# Patient Record
Sex: FEMALE | Race: WHITE | NOT HISPANIC OR LATINO | Employment: FULL TIME | URBAN - METROPOLITAN AREA
[De-identification: names, ages, dates, MRNs, and addresses within clinical notes are randomized per-mention and may not be internally consistent; named-entity substitution may affect disease eponyms.]

---

## 2020-03-31 ENCOUNTER — TELEPHONE (OUTPATIENT)
Dept: OBSTETRICS AND GYNECOLOGY | Facility: CLINIC | Age: 30
End: 2020-03-31

## 2020-03-31 NOTE — TELEPHONE ENCOUNTER
Called patient and changed appointment to a Video visit and patient stated that works perfect for them and appointment made and noted. Patient stated wanted to get a IUD ordered.

## 2020-04-01 ENCOUNTER — TELEPHONE (OUTPATIENT)
Dept: OBSTETRICS AND GYNECOLOGY | Facility: CLINIC | Age: 30
End: 2020-04-01

## 2020-04-01 ENCOUNTER — OFFICE VISIT (OUTPATIENT)
Dept: OBSTETRICS AND GYNECOLOGY | Facility: CLINIC | Age: 30
End: 2020-04-01
Payer: COMMERCIAL

## 2020-04-01 DIAGNOSIS — Z30.09 BIRTH CONTROL COUNSELING: Primary | ICD-10-CM

## 2020-04-01 DIAGNOSIS — Z30.9 ENCOUNTER FOR CONTRACEPTIVE MANAGEMENT, UNSPECIFIED TYPE: ICD-10-CM

## 2020-04-01 PROCEDURE — 99202 PR OFFICE/OUTPT VISIT, NEW, LEVL II, 15-29 MIN: ICD-10-PCS | Mod: 95,,, | Performed by: OBSTETRICS & GYNECOLOGY

## 2020-04-01 PROCEDURE — 99202 OFFICE O/P NEW SF 15 MIN: CPT | Mod: 95,,, | Performed by: OBSTETRICS & GYNECOLOGY

## 2020-04-01 NOTE — TELEPHONE ENCOUNTER
Called patient and change appointment time MD request and patient stated that works for them. Thanked patient for changing time.

## 2020-04-01 NOTE — PROGRESS NOTES
No chief complaint on file.      History of Present Illness   29 y.o.  female patient presents today for IUD consultation.  x 1.   Counseled on Risks, Benefits and Alternatives to progesterone IUD, discused with patient in detail, all questions answered and patient agreed to proceed. Recommended Chiquita.     The patient location is: home  The chief complaint leading to consultation is: IUD counseling  Visit type: Virtual visit with synchronous audio and video  Total time spent with patient: 15 myocardial infarction   Each patient to whom he or she provides medical services by telemedicine is:  (1) informed of the relationship between the physician and patient and the respective role of any other health care provider with respect to management of the patient; and (2) notified that he or she may decline to receive medical services by telemedicine and may withdraw from such care at any time.      Past medical and surgical history reviewed.   I have reviewed the patient's medical history in detail and updated the computerized patient record.    Review of patient's allergies indicates:   Allergen Reactions    Eggs [egg derived] Nausea Only    Ondansetron Dermatitis    Sulfa (sulfonamide antibiotics) Nausea Only         Review of Systems - Negative except HPI  GEN ROS: negative for - chills or fever  Breast ROS: negative for breast lumps  Genito-Urinary ROS: no dysuria, trouble voiding, or hematuria      Physical Examination:  Deferred, video visit      Assessment:  Request IUD for Birth control   Order chiquita IUD- good IUD condidate    Plan:  Follow up with next cycle for IUD placement  Patient informed will be contacted with results within 2 weeks. Encouraged to please call back or email if she has not heard from us by then.

## 2020-04-09 ENCOUNTER — TELEPHONE (OUTPATIENT)
Dept: OBSTETRICS AND GYNECOLOGY | Facility: CLINIC | Age: 30
End: 2020-04-09

## 2020-04-09 NOTE — TELEPHONE ENCOUNTER
Patient called and notified IUD was approved and appointment was made and noted. Patient stated thanks for the call.

## 2020-04-21 DIAGNOSIS — Z01.84 ANTIBODY RESPONSE EXAMINATION: ICD-10-CM

## 2020-04-23 ENCOUNTER — LAB VISIT (OUTPATIENT)
Dept: LAB | Facility: HOSPITAL | Age: 30
End: 2020-04-23
Attending: INTERNAL MEDICINE
Payer: COMMERCIAL

## 2020-04-23 DIAGNOSIS — Z01.84 ANTIBODY RESPONSE EXAMINATION: ICD-10-CM

## 2020-04-23 LAB — SARS-COV-2 IGG SERPL QL IA: NEGATIVE

## 2020-04-23 PROCEDURE — 36415 COLL VENOUS BLD VENIPUNCTURE: CPT

## 2020-04-23 PROCEDURE — 86769 SARS-COV-2 COVID-19 ANTIBODY: CPT

## 2020-05-01 RX ORDER — CIPROFLOXACIN 500 MG/1
500 TABLET ORAL EVERY 12 HOURS
Qty: 14 TABLET | Refills: 0 | Status: SHIPPED | OUTPATIENT
Start: 2020-05-01 | End: 2020-05-08

## 2020-05-06 ENCOUNTER — TELEPHONE (OUTPATIENT)
Dept: OBSTETRICS AND GYNECOLOGY | Facility: CLINIC | Age: 30
End: 2020-05-06

## 2020-05-06 NOTE — TELEPHONE ENCOUNTER
Called patient and notified them they need to refrain from sexual intercourse two weeks prior to having IUD placement and they stated they understood and stated thanks for the notifying them.

## 2020-05-18 ENCOUNTER — PATIENT MESSAGE (OUTPATIENT)
Dept: OBSTETRICS AND GYNECOLOGY | Facility: CLINIC | Age: 30
End: 2020-05-18

## 2020-06-09 ENCOUNTER — OFFICE VISIT (OUTPATIENT)
Dept: OBSTETRICS AND GYNECOLOGY | Facility: CLINIC | Age: 30
End: 2020-06-09
Payer: COMMERCIAL

## 2020-06-09 VITALS
BODY MASS INDEX: 29.04 KG/M2 | DIASTOLIC BLOOD PRESSURE: 60 MMHG | SYSTOLIC BLOOD PRESSURE: 104 MMHG | WEIGHT: 179.88 LBS

## 2020-06-09 DIAGNOSIS — Z30.430 ENCOUNTER FOR IUD INSERTION: ICD-10-CM

## 2020-06-09 LAB
B-HCG UR QL: NEGATIVE
CTP QC/QA: YES

## 2020-06-09 PROCEDURE — 99999 PR PBB SHADOW E&M-EST. PATIENT-LVL III: CPT | Mod: PBBFAC,,, | Performed by: OBSTETRICS & GYNECOLOGY

## 2020-06-09 PROCEDURE — 58300 INSERT INTRAUTERINE DEVICE: CPT | Mod: S$GLB,,, | Performed by: OBSTETRICS & GYNECOLOGY

## 2020-06-09 PROCEDURE — 81025 POCT URINE PREGNANCY: ICD-10-PCS | Mod: S$GLB,,, | Performed by: OBSTETRICS & GYNECOLOGY

## 2020-06-09 PROCEDURE — 99999 PR PBB SHADOW E&M-EST. PATIENT-LVL III: ICD-10-PCS | Mod: PBBFAC,,, | Performed by: OBSTETRICS & GYNECOLOGY

## 2020-06-09 PROCEDURE — 81025 URINE PREGNANCY TEST: CPT | Mod: S$GLB,,, | Performed by: OBSTETRICS & GYNECOLOGY

## 2020-06-09 PROCEDURE — 58300 PR INSERT INTRAUTERINE DEVICE: ICD-10-PCS | Mod: S$GLB,,, | Performed by: OBSTETRICS & GYNECOLOGY

## 2020-06-09 PROCEDURE — 99499 UNLISTED E&M SERVICE: CPT | Mod: S$GLB,,, | Performed by: OBSTETRICS & GYNECOLOGY

## 2020-06-09 PROCEDURE — 99499 NO LOS: ICD-10-PCS | Mod: S$GLB,,, | Performed by: OBSTETRICS & GYNECOLOGY

## 2020-06-09 RX ORDER — SERTRALINE HYDROCHLORIDE 100 MG/1
100 TABLET, FILM COATED ORAL DAILY
COMMUNITY
End: 2020-08-11 | Stop reason: SDUPTHER

## 2020-06-09 NOTE — PROGRESS NOTES
Intrauterine device Placement:  6/9/2020      PRE-IUD PLACEMENT COUNSELING:  All contraceptive options were reviewed and the patient chooses an IUD.  The patient's history was reviewed and there are no contraindications to an IUD. The procedure and minimal risks of pain, bleeding, perforation and infection at the insertion and spontaneous expulsion within the first two weeks was discussed. The benefits of amenorrhea and no systemic side effects were explained. All questions were answered and the patient agrees to proceed. Consent was signed (scanned into computer).    EXAM:  Uterine Position: mid    PROCEDURE:  TIME OUT PERFORMED.  The cervix visualized with a speculum.  A single tooth tenaculum was not placed on the anterior lip.  The uterus sounded to 8cm using sterile technique.  A Liletta IUD (lot#[id number])  was loaded and placed high in uterine fundus without difficulty using sterile technique.  The string was cut to 2cm length from exo cervix.   All instruments were removed from the cervix and vagina and the procedure was tolerated well.     ASSESSMENT:  1. Contraception management / IUD insertion.V25.0.    POST IUD PLACEMENT COUNSELING:  Manage post IUD placement pain with NSAIDs, Tylenol or Rx per MedCard.  IUD danger signs and how to check the strings.  Removal in 5 years for Mirena IUD and in 10 years for Copper IUD.    Counseling lasted approximately 15 minutes and all her questions were answered.    FOLLOW-UP: With me in four weeks.

## 2020-07-07 ENCOUNTER — OFFICE VISIT (OUTPATIENT)
Dept: OBSTETRICS AND GYNECOLOGY | Facility: CLINIC | Age: 30
End: 2020-07-07
Payer: COMMERCIAL

## 2020-07-07 VITALS
SYSTOLIC BLOOD PRESSURE: 120 MMHG | WEIGHT: 176.81 LBS | DIASTOLIC BLOOD PRESSURE: 64 MMHG | HEIGHT: 66 IN | BODY MASS INDEX: 28.42 KG/M2

## 2020-07-07 DIAGNOSIS — Z12.4 PAP SMEAR FOR CERVICAL CANCER SCREENING: Primary | ICD-10-CM

## 2020-07-07 PROCEDURE — 87624 HPV HI-RISK TYP POOLED RSLT: CPT

## 2020-07-07 PROCEDURE — 99395 PREV VISIT EST AGE 18-39: CPT | Mod: S$GLB,,, | Performed by: OBSTETRICS & GYNECOLOGY

## 2020-07-07 PROCEDURE — 88175 CYTOPATH C/V AUTO FLUID REDO: CPT | Performed by: PATHOLOGY

## 2020-07-07 PROCEDURE — 99395 PR PREVENTIVE VISIT,EST,18-39: ICD-10-PCS | Mod: S$GLB,,, | Performed by: OBSTETRICS & GYNECOLOGY

## 2020-07-07 PROCEDURE — 88141 CYTOPATH C/V INTERPRET: CPT | Mod: ,,, | Performed by: PATHOLOGY

## 2020-07-07 PROCEDURE — 88141 PR  CYTOPATH CERV/VAG INTERPRET: ICD-10-PCS | Mod: ,,, | Performed by: PATHOLOGY

## 2020-07-07 PROCEDURE — 99999 PR PBB SHADOW E&M-EST. PATIENT-LVL III: ICD-10-PCS | Mod: PBBFAC,,, | Performed by: OBSTETRICS & GYNECOLOGY

## 2020-07-07 PROCEDURE — 99999 PR PBB SHADOW E&M-EST. PATIENT-LVL III: CPT | Mod: PBBFAC,,, | Performed by: OBSTETRICS & GYNECOLOGY

## 2020-07-07 NOTE — PROGRESS NOTES
Chief Complaint   Patient presents with    pap/iud check       History and Physical:  No LMP recorded.       Veronique Vick is a 29 y.o.  female who presents today for her routine annual GYN exam. The patient has no Gynecology complaints today. iud check      Allergies:   Review of patient's allergies indicates:   Allergen Reactions    Eggs [egg derived] Nausea Only    Ondansetron Dermatitis    Sulfa (sulfonamide antibiotics) Nausea Only    Sulfamethoxazole-trimethoprim Diarrhea       Past Medical History:   Diagnosis Date    Wrist fracture     left       Past Surgical History:   Procedure Laterality Date    WISDOM TOOTH EXTRACTION         MEDS:   Current Outpatient Medications on File Prior to Visit   Medication Sig Dispense Refill    sertraline (ZOLOFT) 100 MG tablet Take 100 mg by mouth once daily.       No current facility-administered medications on file prior to visit.        OB History        1    Para   1    Term                AB        Living   1       SAB        TAB        Ectopic        Multiple        Live Births                     Social History     Socioeconomic History    Marital status: Single     Spouse name: Not on file    Number of children: Not on file    Years of education: Not on file    Highest education level: Not on file   Occupational History    Not on file   Social Needs    Financial resource strain: Not on file    Food insecurity     Worry: Not on file     Inability: Not on file    Transportation needs     Medical: Not on file     Non-medical: Not on file   Tobacco Use    Smoking status: Never Smoker    Smokeless tobacco: Never Used   Substance and Sexual Activity    Alcohol use: Yes     Alcohol/week: 0.0 standard drinks     Comment: social    Drug use: No    Sexual activity: Yes     Partners: Male     Birth control/protection: I.U.D.   Lifestyle    Physical activity     Days per week: Not on file     Minutes per session: Not on file     "Stress: Not on file   Relationships    Social connections     Talks on phone: Not on file     Gets together: Not on file     Attends Sikhism service: Not on file     Active member of club or organization: Not on file     Attends meetings of clubs or organizations: Not on file     Relationship status: Not on file   Other Topics Concern    Not on file   Social History Narrative    Not on file       Family History   Problem Relation Age of Onset    Hyperlipidemia Father     Hypertension Father          Past medical and surgical history reviewed.   I have reviewed the patient's medical history in detail and updated the computerized patient record.        Review of System:   General: no chills, fever, night sweats, weight gain or weight loss  Psychological: no depression or suicidal ideation  Breasts: no new or changing breast lumps, nipple discharge or masses.  Respiratory: no cough, shortness of breath, or wheezing  Cardiovascular: no chest pain or dyspnea on exertion  Gastrointestinal: no abdominal pain, change in bowel habits, or black or bloody stools  Genito-Urinary: no incontinence, urinary frequency/urgency or vulvar/vaginal symptoms, pelvic pain or abnormal vaginal bleeding.  Musculoskeletal: no gait disturbance or muscular weakness      Physical Exam:   /64   Ht 5' 6" (1.676 m)   Wt 80.2 kg (176 lb 12.9 oz)   BMI 28.54 kg/m²   Constitutional: She appears alert and responsive. She appears well-developed, well-groomed, and well-nourished. No distress. OverWeight   HENT:   Head: Normocephalic and atraumatic.   Eyes: Conjunctivae and EOM are normal. No scleral icterus.   Neck: Symmetrical. Normal range of motion. Neck supple. No tracheal deviation present. THYROID: without masses or tenderness.  Cardiovascular: Normal rate, no rhythm abnormality noted. Extremities without swelling or edema, warm.    Pulmonary/Chest: Normal respiratory Effort. No distress or retractions. She exhibits no " tenderness.  Breasts: are symmetrical.   Right breast exhibits no inverted nipple, no mass, no nipple discharge, no skin change and no tenderness.   Left breast exhibits no inverted nipple, no mass, no nipple discharge, no skin change and no tenderness.  Abdominal: Soft. She exhibits no distension, hernias or masses. There is no tenderness. No enlargement of liver edge or spleen.  There is no rebound and no guarding.   Genitourinary:    External rectal exam shows no thrombosed external hemorrhoids, no lesions.     Pelvic exam was performed with patient supine.   No labial fusion, and symmetrical.    There is no rash, lesion or injury on the right labia.   There is no rash, lesion or injury on the left labia.   No bleeding and no signs of injury around the vaginal introitus, urethral meatus is normal size and without prolapse or lesions, urethra well supported. The cervix is visualized with no discharge, lesions or friability. IUD string trimmed   No vaginal discharge found.    No significant Cystocele, Enterocele or rectocele, and cervix and uterus well supported.   Bimanual exam:   The urethra is normal to palpation and there are no palpable vaginal wall masses.   Uterus is not deviated, not enlarged, not fixed, normal shape and not tender.   Cervix exhibits no motion tenderness.    Right adnexum displays no mass or nodularity and no tenderness.   Left adnexum displays no mass or nodularity and no tenderness.  Musculoskeletal: Normal range of motion.   Lymphadenopathy: No inguinal adenopathy present.   Neurological: She is alert and oriented to person, place, and time. Coordination normal.   Skin: Skin is warm and dry. She is not diaphoretic. No rashes, lesions or ulcers.   Psychiatric: She has a normal mood and affect, oriented to person, place, and time.      Assessment:   Normal annual GYN exam  1. Pap smear for cervical cancer screening  Liquid-Based Pap Smear, Screening    HPV High Risk Genotypes, PCR   doing  well with IUD    Plan:   PAP  Follow up in 1 year.  Patient informed will be contacted with results within 2 weeks. Encouraged to please call back or email if she has not heard from us by then.

## 2020-07-13 LAB
HPV HR 12 DNA SPEC QL NAA+PROBE: POSITIVE
HPV16 AG SPEC QL: NEGATIVE
HPV18 DNA SPEC QL NAA+PROBE: NEGATIVE

## 2020-07-15 LAB
FINAL PATHOLOGIC DIAGNOSIS: ABNORMAL
Lab: ABNORMAL

## 2020-07-28 ENCOUNTER — OFFICE VISIT (OUTPATIENT)
Dept: OBSTETRICS AND GYNECOLOGY | Facility: CLINIC | Age: 30
End: 2020-07-28
Payer: COMMERCIAL

## 2020-07-28 VITALS — WEIGHT: 176.56 LBS | BODY MASS INDEX: 28.5 KG/M2 | DIASTOLIC BLOOD PRESSURE: 68 MMHG | SYSTOLIC BLOOD PRESSURE: 124 MMHG

## 2020-07-28 DIAGNOSIS — R87.619 ABNORMAL CERVICAL PAPANICOLAOU SMEAR, UNSPECIFIED ABNORMAL PAP FINDING: Primary | ICD-10-CM

## 2020-07-28 LAB
B-HCG UR QL: NEGATIVE
CTP QC/QA: YES

## 2020-07-28 PROCEDURE — 99999 PR PBB SHADOW E&M-EST. PATIENT-LVL III: CPT | Mod: PBBFAC,,, | Performed by: OBSTETRICS & GYNECOLOGY

## 2020-07-28 PROCEDURE — 99499 UNLISTED E&M SERVICE: CPT | Mod: S$GLB,,, | Performed by: OBSTETRICS & GYNECOLOGY

## 2020-07-28 PROCEDURE — 99999 PR PBB SHADOW E&M-EST. PATIENT-LVL III: ICD-10-PCS | Mod: PBBFAC,,, | Performed by: OBSTETRICS & GYNECOLOGY

## 2020-07-28 PROCEDURE — 57452 PR COLPOSCOPY,CERVIX W/ADJ VAGINA: ICD-10-PCS | Mod: S$GLB,,, | Performed by: OBSTETRICS & GYNECOLOGY

## 2020-07-28 PROCEDURE — 99499 NO LOS: ICD-10-PCS | Mod: S$GLB,,, | Performed by: OBSTETRICS & GYNECOLOGY

## 2020-07-28 PROCEDURE — 57452 EXAM OF CERVIX W/SCOPE: CPT | Mod: S$GLB,,, | Performed by: OBSTETRICS & GYNECOLOGY

## 2020-07-28 NOTE — PROGRESS NOTES
COLPOSCOPY:  7/28/2020    PAP Result: ASCUS w HR-HPV  1. Abnormal cervical Papanicolaou smear, unspecified abnormal pap finding  POCT urine pregnancy       PRE-COLPOSCOPY PROCEDURE COUNSELING:  Discussed the abnormal pap test findings, HPV, need for colposcopy and possible biopsies to determine a diagnosis and plan of care, treatments available, the minimal risks of bleeding and infection with a colposcopy, alternatives to colposcopy and she agrees to proceed.    Procedure:   Speculum was placed. Cervix completely visualized. transformation zone clearly visualized. Green filter activated: vascular abnormalities: not seen  Green filter disengaged and acetic acid applied in the usual fashion:  AcetoWhite epithelium not seen.  Mosaic pattern not seen.  Biopsy not performed.  ECC not done.    Monsel's solution applied to biopsy site for hemostasis and tolerated well.   The speculum was removed.  The patient tolerated the procedure well.    POST COLPOSCOPY COUNSELING:   Manage post colposcopy cramping with NSAIDs, Tylenol or Rx per MedCard.  Avoid anything in vagina (intercourse, douching, tampons) one week after the procedure.  HPV vaccine recommended according to FDA age guidelines.  Importance of follow-up stressed.    Counseling lasted approximately 15 minutes and all her questions were answered.    Assessment:  1. Abnormal cervical Papanicolaou smear, unspecified abnormal pap finding  POCT urine pregnancy   normal colposcopy findings today    Plan:  PAP 4 months until 3 normal    Patient informed will be contacted within 2 weeks of results, either normal or abnormal. Please call if she has not heard from us by then.

## 2020-08-11 ENCOUNTER — OFFICE VISIT (OUTPATIENT)
Dept: FAMILY MEDICINE | Facility: CLINIC | Age: 30
End: 2020-08-11
Payer: COMMERCIAL

## 2020-08-11 VITALS
HEART RATE: 81 BPM | HEIGHT: 66 IN | OXYGEN SATURATION: 99 % | BODY MASS INDEX: 28.16 KG/M2 | DIASTOLIC BLOOD PRESSURE: 76 MMHG | TEMPERATURE: 97 F | SYSTOLIC BLOOD PRESSURE: 122 MMHG | WEIGHT: 175.25 LBS | RESPIRATION RATE: 18 BRPM

## 2020-08-11 DIAGNOSIS — Z11.3 SCREENING FOR STDS (SEXUALLY TRANSMITTED DISEASES): ICD-10-CM

## 2020-08-11 DIAGNOSIS — F41.9 ANXIETY AND DEPRESSION: ICD-10-CM

## 2020-08-11 DIAGNOSIS — Z00.00 ANNUAL PHYSICAL EXAM: Primary | ICD-10-CM

## 2020-08-11 DIAGNOSIS — F32.A ANXIETY AND DEPRESSION: ICD-10-CM

## 2020-08-11 PROCEDURE — 99385 PR PREVENTIVE VISIT,NEW,18-39: ICD-10-PCS | Mod: S$GLB,,, | Performed by: PHYSICIAN ASSISTANT

## 2020-08-11 PROCEDURE — 99999 PR PBB SHADOW E&M-EST. PATIENT-LVL III: CPT | Mod: PBBFAC,,, | Performed by: PHYSICIAN ASSISTANT

## 2020-08-11 PROCEDURE — 99999 PR PBB SHADOW E&M-EST. PATIENT-LVL III: ICD-10-PCS | Mod: PBBFAC,,, | Performed by: PHYSICIAN ASSISTANT

## 2020-08-11 PROCEDURE — 99385 PREV VISIT NEW AGE 18-39: CPT | Mod: S$GLB,,, | Performed by: PHYSICIAN ASSISTANT

## 2020-08-11 RX ORDER — SERTRALINE HYDROCHLORIDE 100 MG/1
100 TABLET, FILM COATED ORAL DAILY
Qty: 90 TABLET | Refills: 1 | Status: SHIPPED | OUTPATIENT
Start: 2020-08-11 | End: 2020-08-17 | Stop reason: SDUPTHER

## 2020-08-12 ENCOUNTER — LAB VISIT (OUTPATIENT)
Dept: LAB | Facility: HOSPITAL | Age: 30
End: 2020-08-12
Attending: PHYSICIAN ASSISTANT
Payer: COMMERCIAL

## 2020-08-12 DIAGNOSIS — Z11.3 SCREENING FOR STDS (SEXUALLY TRANSMITTED DISEASES): ICD-10-CM

## 2020-08-12 DIAGNOSIS — Z00.00 ANNUAL PHYSICAL EXAM: ICD-10-CM

## 2020-08-12 LAB
ALBUMIN SERPL BCP-MCNC: 3.9 G/DL (ref 3.5–5.2)
ALP SERPL-CCNC: 52 U/L (ref 55–135)
ALT SERPL W/O P-5'-P-CCNC: 14 U/L (ref 10–44)
ANION GAP SERPL CALC-SCNC: 10 MMOL/L (ref 8–16)
AST SERPL-CCNC: 14 U/L (ref 10–40)
BASOPHILS # BLD AUTO: 0.02 K/UL (ref 0–0.2)
BASOPHILS NFR BLD: 0.4 % (ref 0–1.9)
BILIRUB SERPL-MCNC: 0.3 MG/DL (ref 0.1–1)
BUN SERPL-MCNC: 8 MG/DL (ref 6–20)
CALCIUM SERPL-MCNC: 9 MG/DL (ref 8.7–10.5)
CHLORIDE SERPL-SCNC: 107 MMOL/L (ref 95–110)
CHOLEST SERPL-MCNC: 155 MG/DL (ref 120–199)
CHOLEST/HDLC SERPL: 3.7 {RATIO} (ref 2–5)
CO2 SERPL-SCNC: 22 MMOL/L (ref 23–29)
CREAT SERPL-MCNC: 0.8 MG/DL (ref 0.5–1.4)
DIFFERENTIAL METHOD: NORMAL
EOSINOPHIL # BLD AUTO: 0.1 K/UL (ref 0–0.5)
EOSINOPHIL NFR BLD: 1.5 % (ref 0–8)
ERYTHROCYTE [DISTWIDTH] IN BLOOD BY AUTOMATED COUNT: 11.9 % (ref 11.5–14.5)
EST. GFR  (AFRICAN AMERICAN): >60 ML/MIN/1.73 M^2
EST. GFR  (NON AFRICAN AMERICAN): >60 ML/MIN/1.73 M^2
GLUCOSE SERPL-MCNC: 81 MG/DL (ref 70–110)
HCT VFR BLD AUTO: 39.4 % (ref 37–48.5)
HDLC SERPL-MCNC: 42 MG/DL (ref 40–75)
HDLC SERPL: 27.1 % (ref 20–50)
HGB BLD-MCNC: 12.8 G/DL (ref 12–16)
IMM GRANULOCYTES # BLD AUTO: 0.01 K/UL (ref 0–0.04)
IMM GRANULOCYTES NFR BLD AUTO: 0.2 % (ref 0–0.5)
LDLC SERPL CALC-MCNC: 99.4 MG/DL (ref 63–159)
LYMPHOCYTES # BLD AUTO: 1.8 K/UL (ref 1–4.8)
LYMPHOCYTES NFR BLD: 40 % (ref 18–48)
MCH RBC QN AUTO: 29.5 PG (ref 27–31)
MCHC RBC AUTO-ENTMCNC: 32.5 G/DL (ref 32–36)
MCV RBC AUTO: 91 FL (ref 82–98)
MONOCYTES # BLD AUTO: 0.3 K/UL (ref 0.3–1)
MONOCYTES NFR BLD: 7 % (ref 4–15)
NEUTROPHILS # BLD AUTO: 2.3 K/UL (ref 1.8–7.7)
NEUTROPHILS NFR BLD: 50.9 % (ref 38–73)
NONHDLC SERPL-MCNC: 113 MG/DL
NRBC BLD-RTO: 0 /100 WBC
PLATELET # BLD AUTO: 175 K/UL (ref 150–350)
PMV BLD AUTO: 11 FL (ref 9.2–12.9)
POTASSIUM SERPL-SCNC: 3.9 MMOL/L (ref 3.5–5.1)
PROT SERPL-MCNC: 7.9 G/DL (ref 6–8.4)
RBC # BLD AUTO: 4.34 M/UL (ref 4–5.4)
RPR SER QL: NORMAL
SODIUM SERPL-SCNC: 139 MMOL/L (ref 136–145)
TRIGL SERPL-MCNC: 68 MG/DL (ref 30–150)
TSH SERPL DL<=0.005 MIU/L-ACNC: 0.58 UIU/ML (ref 0.4–4)
WBC # BLD AUTO: 4.55 K/UL (ref 3.9–12.7)

## 2020-08-12 PROCEDURE — 80061 LIPID PANEL: CPT

## 2020-08-12 PROCEDURE — 86592 SYPHILIS TEST NON-TREP QUAL: CPT

## 2020-08-12 PROCEDURE — 85025 COMPLETE CBC W/AUTO DIFF WBC: CPT

## 2020-08-12 PROCEDURE — 87389 HIV-1 AG W/HIV-1&-2 AB AG IA: CPT

## 2020-08-12 PROCEDURE — 80074 ACUTE HEPATITIS PANEL: CPT

## 2020-08-12 PROCEDURE — 84443 ASSAY THYROID STIM HORMONE: CPT

## 2020-08-12 PROCEDURE — 36415 COLL VENOUS BLD VENIPUNCTURE: CPT

## 2020-08-12 PROCEDURE — 80053 COMPREHEN METABOLIC PANEL: CPT

## 2020-08-12 PROCEDURE — 86703 HIV-1/HIV-2 1 RESULT ANTBDY: CPT

## 2020-08-12 NOTE — PROGRESS NOTES
Subjective:       Patient ID: Veronique Vick is a 29 y.o. female.    Chief Complaint: Annual Exam    Ms. Vick comes to clinic to establish care. The patient is followed by OBGYN, Dr. Fields. She is up to date on her pap and immunizations. The patient is due for routine blood work at this time. The patient has history of anxiety and depression. The patient is taking zoloft; she tolerates this well with no adverse side effects. The patient is also currently seeing a therapist. The patient does request STD testing due to recently learning of infidelity of previous partners.     Review of patient's allergies indicates:   Allergen Reactions    Eggs [egg derived] Nausea Only    Ondansetron Dermatitis    Sulfa (sulfonamide antibiotics) Nausea Only    Sulfamethoxazole-trimethoprim Diarrhea         Current Outpatient Medications:     sertraline (ZOLOFT) 100 MG tablet, Take 1 tablet (100 mg total) by mouth once daily., Disp: 90 tablet, Rfl: 1    Lab Results   Component Value Date    WBC 6.50 03/16/2016    HGB 12.6 03/16/2016    HCT 37.2 03/16/2016     03/16/2016     03/16/2016    K 4.2 03/16/2016     03/16/2016    CREATININE 0.8 03/16/2016    BUN 9 03/16/2016    CO2 24 03/16/2016       Review of Systems   Constitutional: Negative for activity change, appetite change and fever.   HENT: Negative for postnasal drip, rhinorrhea and sinus pressure.    Eyes: Negative for visual disturbance.   Respiratory: Negative for cough and shortness of breath.    Cardiovascular: Negative for chest pain.   Gastrointestinal: Negative for abdominal distention and abdominal pain.   Genitourinary: Negative for difficulty urinating and dysuria.   Musculoskeletal: Negative for arthralgias and myalgias.   Neurological: Negative for headaches.   Hematological: Negative for adenopathy.   Psychiatric/Behavioral: Positive for dysphoric mood. The patient is nervous/anxious.        Objective:      Physical  Exam  Constitutional:       Appearance: Normal appearance.   HENT:      Head: Normocephalic and atraumatic.      Right Ear: Tympanic membrane normal.      Left Ear: Tympanic membrane normal.   Cardiovascular:      Rate and Rhythm: Normal rate and regular rhythm.      Heart sounds: Normal heart sounds.   Pulmonary:      Effort: Pulmonary effort is normal.      Breath sounds: Normal breath sounds. No wheezing.   Abdominal:      General: Bowel sounds are normal. There is no distension.      Palpations: Abdomen is soft.      Tenderness: There is no abdominal tenderness.   Musculoskeletal:         General: No swelling.   Lymphadenopathy:      Cervical: No cervical adenopathy.   Skin:     Findings: No erythema.   Neurological:      Mental Status: She is alert and oriented to person, place, and time.   Psychiatric:         Behavior: Behavior normal.         Assessment:       1. Annual physical exam    2. Screening for STDs (sexually transmitted diseases)    3. Anxiety and depression        Plan:       Veronique was seen today for annual exam.    Diagnoses and all orders for this visit:    Annual physical exam  -     Lipid Panel; Future  -     Comprehensive metabolic panel; Future  -     CBC auto differential; Future  -     TSH; Future  -     HIV 1/2 Ag/Ab (4th Gen); Future  -     RPR; Future  -     Hepatitis Panel, Acute; Future    Screening for STDs (sexually transmitted diseases)  -     HIV 1/2 Ag/Ab (4th Gen); Future  -     RPR; Future  -     Hepatitis Panel, Acute; Future    Anxiety and depression  -     sertraline (ZOLOFT) 100 MG tablet; Take 1 tablet (100 mg total) by mouth once daily.  Continue current medication  Continue weekly therapy    Patient will be notified of lab results when they return.

## 2020-08-13 LAB
HAV IGM SERPL QL IA: NEGATIVE
HBV CORE IGM SERPL QL IA: NEGATIVE
HBV SURFACE AG SERPL QL IA: NEGATIVE
HCV AB SERPL QL IA: NEGATIVE

## 2020-08-14 DIAGNOSIS — F41.9 ANXIETY AND DEPRESSION: ICD-10-CM

## 2020-08-14 DIAGNOSIS — F32.A ANXIETY AND DEPRESSION: ICD-10-CM

## 2020-08-14 DIAGNOSIS — B20 HIV-1 (HUMAN IMMUNODEFICIENCY VIRUS I): ICD-10-CM

## 2020-08-14 DIAGNOSIS — B20 HIV INFECTION, UNSPECIFIED SYMPTOM STATUS: Primary | ICD-10-CM

## 2020-08-14 LAB
HIV 1+2 AB+HIV1 P24 AG SERPL QL IA: POSITIVE
HIV SUPPLEMENTAL ASSAY INTERPRETATION: ABNORMAL
HIV-1 RESULT: POSITIVE
HIV-2 RESULT: NEGATIVE

## 2020-08-17 ENCOUNTER — OFFICE VISIT (OUTPATIENT)
Dept: INFECTIOUS DISEASES | Facility: CLINIC | Age: 30
End: 2020-08-17
Payer: COMMERCIAL

## 2020-08-17 ENCOUNTER — LAB VISIT (OUTPATIENT)
Dept: LAB | Facility: HOSPITAL | Age: 30
End: 2020-08-17
Attending: INTERNAL MEDICINE
Payer: COMMERCIAL

## 2020-08-17 VITALS
HEIGHT: 66 IN | BODY MASS INDEX: 27.8 KG/M2 | OXYGEN SATURATION: 100 % | DIASTOLIC BLOOD PRESSURE: 86 MMHG | WEIGHT: 173 LBS | SYSTOLIC BLOOD PRESSURE: 126 MMHG | HEART RATE: 71 BPM | TEMPERATURE: 98 F

## 2020-08-17 DIAGNOSIS — Z01.84 ENCOUNTER FOR ANTIBODY RESPONSE EXAMINATION: ICD-10-CM

## 2020-08-17 DIAGNOSIS — B20 HIV INFECTION, UNSPECIFIED SYMPTOM STATUS: Primary | ICD-10-CM

## 2020-08-17 DIAGNOSIS — B20 HIV INFECTION, UNSPECIFIED SYMPTOM STATUS: ICD-10-CM

## 2020-08-17 LAB — 25(OH)D3+25(OH)D2 SERPL-MCNC: 31 NG/ML (ref 30–96)

## 2020-08-17 PROCEDURE — 87491 CHLMYD TRACH DNA AMP PROBE: CPT | Mod: 59

## 2020-08-17 PROCEDURE — 86706 HEP B SURFACE ANTIBODY: CPT

## 2020-08-17 PROCEDURE — 86708 HEPATITIS A ANTIBODY: CPT

## 2020-08-17 PROCEDURE — 86777 TOXOPLASMA ANTIBODY: CPT

## 2020-08-17 PROCEDURE — 86769 SARS-COV-2 COVID-19 ANTIBODY: CPT

## 2020-08-17 PROCEDURE — 36415 COLL VENOUS BLD VENIPUNCTURE: CPT

## 2020-08-17 PROCEDURE — 99245 PR OFFICE CONSULTATION,LEVEL V: ICD-10-PCS | Mod: S$GLB,,, | Performed by: INTERNAL MEDICINE

## 2020-08-17 PROCEDURE — 86480 TB TEST CELL IMMUN MEASURE: CPT

## 2020-08-17 PROCEDURE — 81381 HLA I TYPING 1 ALLELE HR: CPT

## 2020-08-17 PROCEDURE — 30000890 LABCORP MISCELLANEOUS TEST

## 2020-08-17 PROCEDURE — 99245 OFF/OP CONSLTJ NEW/EST HI 55: CPT | Mod: S$GLB,,, | Performed by: INTERNAL MEDICINE

## 2020-08-17 PROCEDURE — 82306 VITAMIN D 25 HYDROXY: CPT

## 2020-08-17 PROCEDURE — 86704 HEP B CORE ANTIBODY TOTAL: CPT

## 2020-08-17 RX ORDER — SERTRALINE HYDROCHLORIDE 100 MG/1
150 TABLET, FILM COATED ORAL DAILY
Qty: 120 TABLET | Refills: 1 | Status: SHIPPED | OUTPATIENT
Start: 2020-08-17 | End: 2021-01-13

## 2020-08-17 NOTE — PROGRESS NOTES
Subjective:       Patient ID: Veronique Vick is a 29 y.o. female.    Chief Complaint::   HPI  Her Source of exposure learned that he was positive last November and was on treatment but did not reveal his diagnosis until after their last encounter in April 2020.   Her last test was probably during pregnancy for her 1 year old who exhibits no signs of infection .   Recent serologies were reviewed.   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  Did her own partner notification  Had HBV series as a child  No other STDs  Coping very well, revealed diagnosis to her mother, taking zoloft, seeing a counselor      Current Outpatient Medications:     sertraline (ZOLOFT) 100 MG tablet, Take 1.5 tablets (150 mg total) by mouth once daily., Disp: 120 tablet, Rfl: 1  Review of patient's allergies indicates:   Allergen Reactions    Eggs [egg derived] Nausea Only    Ondansetron Dermatitis    Sulfa (sulfonamide antibiotics) Nausea Only    Sulfamethoxazole-trimethoprim Diarrhea     Past Medical History:   Diagnosis Date    Wrist fracture     left     Past Surgical History:   Procedure Laterality Date    ORIF RADIUS & ULNA FRACTURES Left 2016    WISDOM TOOTH EXTRACTION       Social History     Socioeconomic History    Marital status: Single     Spouse name: Not on file    Number of children: Not on file    Years of education: Not on file    Highest education level: Not on file   Occupational History    Not on file   Social Needs    Financial resource strain: Not hard at all    Food insecurity     Worry: Never true     Inability: Never true    Transportation needs     Medical: No     Non-medical: No   Tobacco Use    Smoking status: Never Smoker    Smokeless tobacco: Never Used   Substance and Sexual Activity    Alcohol use: Yes     Alcohol/week: 0.0 standard drinks     Frequency: 2-4 times a  month     Drinks per session: 1 or 2     Binge frequency: Never     Comment: social    Drug use: No    Sexual activity: Yes     Partners: Male     Birth control/protection: I.U.D.   Lifestyle    Physical activity     Days per week: 5 days     Minutes per session: 20 min    Stress: To some extent   Relationships    Social connections     Talks on phone: More than three times a week     Gets together: Twice a week     Attends Worship service: Not on file     Active member of club or organization: No     Attends meetings of clubs or organizations: Never     Relationship status: Never    Other Topics Concern    Not on file   Social History Narrative    Not on file     Family History   Problem Relation Age of Onset    Hyperlipidemia Father     Hypertension Father        Travel History: [place], [place], [place]ise[place]  Vaccine History: Hep B series (6th grade), Tdap 2/2018, yearly flu vaccine(egg allergy), zostavax 2013  Advanced Directive:   Safer Sex: significant other: Jorgito  Gyn: (Dr. Fields)  Mammogram:  Bone Density:   Colonoscopy:    Review of Systems    Constitutional: No fever, chills, sweats, fatigue, weakness, weight loss    Eyes: No change in vision, loss of vision, diplopia, photophobia. UTD eye exam    ENT: No sinus drainage, sore throat, mouth pain, or lesions. UTD dental exam    Cardiovascular: No chest pain, CANNON, palpitations or pedal edema    Respiratory: No shortness of breath, CANNON, cough, wheeze, sputum     Gastrointestinal: No abdominal pain, nausea, vomiting, diarrhea,      Genitourinary: No dysuria, hematuria, , or urethritis    Musculoskeletal: No new pain, joint swelling, or injuries    Integumentary: No new rashes, lesions, or wounds    Neurological: No dizziness, vertigo, unusual headaches, neuropathy, or falls    Psychiatric: understandable amount of anxiety, depression, no memory loss,   or substance abuse    Endocrine: not diabetic.  Thyroid normal    Lymphatic: No  "lymphadenopathy, blood loss, anemia, or malignancy    Objective:      Blood pressure 126/86, pulse 71, temperature 97.8 °F (36.6 °C), temperature source Temporal, height 5' 6" (1.676 m), weight 78.5 kg (173 lb), last menstrual period 07/22/2020, SpO2 100 %. Body mass index is 27.92 kg/m².  Physical Exam      General: Alert and attentive, cooperative and in no distress    Eyes: Pupils equal, round, reactive to light, anicteric, EOMI    Neck: Supple, non-tender, no thyromegaly or masses    ENT: EAC patent, TM normal, nares patent, no oral lesions, teeth in good condition, no thrush    Cardiovascular: Regular rate and rhythm, no murmurs, rubs, or gallop    Respiratory: Lungs clear without wheezes, no rales, rub or rhonci    Gastrointestinal: Active bowel sounds, soft, no mass or organomegaly, no tenderness or distention    Genitourinary: No  flank tenderness    Vascular: No peripheral edema, phlebitis, pulses normal. Warm and well perfused    Musculoskeletal: Ambulates without difficulty, no acute arthritis, synovitis or myositis. Normal muscle bulk and strength    Integumentary: Skin without rashes, lesions, or wounds    AnusRectum:      Neurological: Normal LOC, cranial nerves, speech, reflexes, normal gait    Psychiatric: Normal mood, speech, demeanor    Lymphatic: No cervical, supraclavicular, axillary, or inguinal lymphadenopathy      Wound:     HIV Table:   Toxo IgG  [id number]  8/17/20  RPR   8/12/20 Non reactive  Hepatitis A IgG  Hepatitis B sAg 8/12/20 negative  Hepatitis B sAb  Hepatitis B cAb  Hepatitis C Ab  8/12/20 negative  Chl/GC  Vitamin D  8/12/20 31, low  TB gold  8/17/20  genosure prime 8/17/20        Recent Diagnostics:       Assessment and Plan:           HIV infection, unspecified symptom status  -     Ambulatory referral/consult to Infectious Disease  -     GenoSure PRIme(R); Future; Expected date: 08/17/2020  -     HLA ; Future; Expected date: 08/17/2020  -     Toxoplasma Gondii IgG; Future; " Expected date: 08/17/2020  -     Hepatitis B Surface Ab, Qualitative; Future; Expected date: 08/17/2020  -     Hepatitis B Core Antibody, Total; Future; Expected date: 08/17/2020  -     C. trachomatis/N. gonorrhoeae by AMP DNA  -     Vitamin D; Future; Expected date: 08/17/2020  -     QuantiFERON-TB Gold Plus; Future; Expected date: 08/17/2020  -     Hepatitis A antibody, total; Future; Expected date: 08/17/2020    Encounter for antibody response examination  -     COVID-19 (SARS CoV-2) IgG Antibody; Future; Expected date: 08/17/2020      Discussed at length the following;  Acute HIV infection, latency, immune control, evolution of infection, when to start treatment, adherence to meds, resistance, resistance testing, health maintenance, vaccinations, partner notification, public health contact, prognosis.   Discussed briefly treatment in regards to having more children, drug interactions etc.    Old lab studies from OB: [facility] , [facility]  [phone]    Labs today and then we will review  In your future: prevnar and pneumococcal vaccine    Meningococcal vaccine  Treatment as discussed    Follow up 9/9 1130  This note was created using Dragon voice recognition software that occasionally misinterpreted phrases or words.

## 2020-08-17 NOTE — LETTER
August 18, 2020      Yulisa Whitfield PA-C  [address]  [place] [address]           [facility] - Infectious Diese[facility]  [address]  [place] [address]  Phone: [phone]  Fax: [phone]          Patient: Veronique Vick   MR Number: [id number]   YOB: 1990   Date of Visit: 8/17/2020       Dear Yulisa Whitfield:    Thank you for referring Veronique Vick to me for evaluation. Attached you will find relevant portions of my assessment and plan of care.    If you have questions, please do not hesitate to call me. I look forward to following Veronique Vick along with you.    Sincerely,    Edelmira Ledesma MD    Enclosure  CC:  No Recipients    If you would like to receive this communication electronically, please contact [email or web address] or [phone] to request more information on [employer] access.    For providers and/or their staff who would like to refer a patient to [facility], please contact us through our one-stop-shop provider referral line, [facility], at [phone].    If you feel you have received this communication in error or would no longer like to receive these types of communications, please e-mail [email or web address]

## 2020-08-17 NOTE — PATIENT INSTRUCTIONS
Old lab studies from OB: Angel Angel , [facility] women's  [phone]    Labs today and then we will review  In your future: prevnar and pneumococcal vaccine    Meningococcal vaccine  Treatment as discussed    Follow up 9/9 1130

## 2020-08-18 LAB — SARS-COV-2 IGG SERPLBLD QL IA.RAPID: NEGATIVE

## 2020-08-19 LAB
HAV AB SER QL IA: NEGATIVE
HBV CORE AB SERPL QL IA: NEGATIVE
HBV SURFACE AB SER QL: REACTIVE

## 2020-08-20 LAB
GAMMA INTERFERON BACKGROUND BLD IA-ACNC: 0.04 IU/ML
M TB IFN-G BLD-IMP: NEGATIVE
M TB IFN-G CD4+ BCKGRND COR BLD-ACNC: 0.06 IU/ML
MITOGEN IGNF BLD-ACNC: >10 IU/ML
QUANTIFERON CRITERIA: NORMAL
QUANTIFERON TB2 AG VALUE: 0.06 IU/ML
T GONDII IGG SERPL IA-ACNC: 40.1 IU/ML (ref 0–7.1)

## 2020-08-21 ENCOUNTER — TELEPHONE (OUTPATIENT)
Dept: FAMILY MEDICINE | Facility: CLINIC | Age: 30
End: 2020-08-21

## 2020-08-21 NOTE — TELEPHONE ENCOUNTER
----- Message from Angel Maddox sent at 8/21/2020  9:19 AM CDT -----  Regarding: Needs to speak with the nurse.  Type: Needs Medical Advice    Who Called: Sushma with office of public health  [phone]       Additional Information:     Advised  needs to speak with the nurse about the ptnt, wouldn't say what about.

## 2020-08-22 ENCOUNTER — LAB VISIT (OUTPATIENT)
Dept: LAB | Facility: HOSPITAL | Age: 30
End: 2020-08-22
Attending: INTERNAL MEDICINE
Payer: COMMERCIAL

## 2020-08-22 DIAGNOSIS — B20 HIV INFECTION, UNSPECIFIED SYMPTOM STATUS: ICD-10-CM

## 2020-08-22 PROCEDURE — 87536 HIV-1 QUANT&REVRSE TRNSCRPJ: CPT

## 2020-08-22 PROCEDURE — 86361 T CELL ABSOLUTE COUNT: CPT

## 2020-08-22 PROCEDURE — 36415 COLL VENOUS BLD VENIPUNCTURE: CPT

## 2020-08-24 LAB
CD3+CD4+ CELLS # BLD: 1038 CELLS/UL (ref 300–1400)
CD3+CD4+ CELLS NFR BLD: 41.5 % (ref 28–57)
CHLAMYDIA, AMPLIFIED DNA: NEGATIVE
LABCORP MISC TEST CODE: 6926
LABCORP MISC TEST NAME: NORMAL
LABCORP MISCELLANEOUS TEST: NORMAL
N GONORRHOEAE, AMPLIFIED DNA: NEGATIVE

## 2020-08-26 LAB
HIV UQ DATE RECEIVED: ABNORMAL
HIV UQ DATE REPORTED: ABNORMAL
HIV1 RNA # SERPL NAA+PROBE: ABNORMAL COPIES/ML
HIV1 RNA SERPL NAA+PROBE-LOG#: 3.18 LOG (10) COPIES/ML
HIV1 RNA SERPL QL NAA+PROBE: DETECTED

## 2020-08-27 ENCOUNTER — PATIENT MESSAGE (OUTPATIENT)
Dept: INFECTIOUS DISEASES | Facility: HOSPITAL | Age: 30
End: 2020-08-27

## 2020-09-03 ENCOUNTER — TELEPHONE (OUTPATIENT)
Dept: INFECTIOUS DISEASES | Facility: CLINIC | Age: 30
End: 2020-09-03

## 2020-09-03 DIAGNOSIS — B20 HIV INFECTION, UNSPECIFIED SYMPTOM STATUS: Primary | ICD-10-CM

## 2020-09-03 LAB
LABCORP MISC TEST CODE: NORMAL
LABCORP MISC TEST NAME: NORMAL
LABCORP MISCELLANEOUS TEST: NORMAL

## 2020-09-03 NOTE — TELEPHONE ENCOUNTER
Says the technician must have not drawn enough blood for the Genosure Prime.  So the patient will have to come back in for a redraw and he also needs a new order.    VIKTORIA Contreras

## 2020-09-09 ENCOUNTER — LAB VISIT (OUTPATIENT)
Dept: LAB | Facility: HOSPITAL | Age: 30
End: 2020-09-09
Attending: INTERNAL MEDICINE
Payer: COMMERCIAL

## 2020-09-09 ENCOUNTER — OFFICE VISIT (OUTPATIENT)
Dept: INFECTIOUS DISEASES | Facility: CLINIC | Age: 30
End: 2020-09-09
Payer: COMMERCIAL

## 2020-09-09 VITALS
HEART RATE: 72 BPM | TEMPERATURE: 98 F | OXYGEN SATURATION: 98 % | SYSTOLIC BLOOD PRESSURE: 110 MMHG | BODY MASS INDEX: 28.73 KG/M2 | WEIGHT: 178 LBS | DIASTOLIC BLOOD PRESSURE: 70 MMHG

## 2020-09-09 DIAGNOSIS — Z23 ENCOUNTER FOR IMMUNIZATION: ICD-10-CM

## 2020-09-09 DIAGNOSIS — B20 HIV INFECTION, UNSPECIFIED SYMPTOM STATUS: Primary | ICD-10-CM

## 2020-09-09 DIAGNOSIS — B20 HUMAN IMMUNODEFICIENCY VIRUS (HIV) DISEASE: Primary | ICD-10-CM

## 2020-09-09 PROCEDURE — 90632 HEPA VACCINE ADULT IM: CPT | Mod: S$GLB,,, | Performed by: INTERNAL MEDICINE

## 2020-09-09 PROCEDURE — 90632 HEPATITIS A VACCINE ADULT IM: ICD-10-PCS | Mod: S$GLB,,, | Performed by: INTERNAL MEDICINE

## 2020-09-09 PROCEDURE — 90471 IMMUNIZATION ADMIN: CPT | Mod: S$GLB,,, | Performed by: INTERNAL MEDICINE

## 2020-09-09 PROCEDURE — 99213 PR OFFICE/OUTPT VISIT, EST, LEVL III, 20-29 MIN: ICD-10-PCS | Mod: 25,S$GLB,, | Performed by: INTERNAL MEDICINE

## 2020-09-09 PROCEDURE — 99213 OFFICE O/P EST LOW 20 MIN: CPT | Mod: 25,S$GLB,, | Performed by: INTERNAL MEDICINE

## 2020-09-09 PROCEDURE — 36415 COLL VENOUS BLD VENIPUNCTURE: CPT

## 2020-09-09 PROCEDURE — 90471 HEPATITIS A VACCINE ADULT IM: ICD-10-PCS | Mod: S$GLB,,, | Performed by: INTERNAL MEDICINE

## 2020-09-09 NOTE — PATIENT INSTRUCTIONS
Https://aidsinfo.nih.gov/guidelines/html/3//152/overview    Hepatitis A vaccine #1    Once you are on meds for a little while, we will give prevnar vaccine, then pneumovax, then Menactra    I will call you when the Genosure is available. We discussed initiating therapy with Triumeq.    Flu vaccine late oct/early November or earlier if you start to see flu in your clinic.    Return before year's end.

## 2020-09-09 NOTE — PROGRESS NOTES
Subjective:       Patient ID: Veronique Vick is a 29 y.o. female.    Chief Complaint::   HPI  Her Source of exposure learned that he was positive last November and was on treatment but did not reveal his diagnosis until after their last encounter in April 2020.   Her last test was probably during pregnancy for her 2 year old who exhibits no signs of infection .   Recent serologies were reviewed.   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 The Genosure prime was not completed and will be redrawn. We reviewed the [facility] guidelines for treatment in pregnant women and have come to a tentative agreement to use Triumeq. She learned that the source of her infection was on Biktarvy at the time.    Current Outpatient Medications:     sertraline (ZOLOFT) 100 MG tablet, Take 1.5 tablets (150 mg total) by mouth once daily., Disp: 120 tablet, Rfl: 1  Review of patient's allergies indicates:   Allergen Reactions    Eggs [egg derived] Nausea Only    Ondansetron Dermatitis    Sulfa (sulfonamide antibiotics) Nausea Only    Sulfamethoxazole-trimethoprim Diarrhea     Past Medical History:   Diagnosis Date    Wrist fracture     left     Past Surgical History:   Procedure Laterality Date    ORIF RADIUS & ULNA FRACTURES Left 2016    WISDOM TOOTH EXTRACTION       Social History     Socioeconomic History    Marital status: Single     Spouse name: Not on file    Number of children: Not on file    Years of education: Not on file    Highest education level: Not on file   Occupational History    Not on file   Social Needs    Financial resource strain: Not hard at all    Food insecurity      Worry: Never true     Inability: Never true    Transportation needs     Medical: No     Non-medical: No   Tobacco Use    Smoking status: Never Smoker    Smokeless tobacco: Never Used   Substance and Sexual Activity    Alcohol use: Yes     Alcohol/week: 0.0 standard drinks     Frequency: 2-4 times a month     Drinks per session: 1 or 2     Binge frequency: Never     Comment: social    Drug use: No    Sexual activity: Yes     Partners: Male     Birth control/protection: I.U.D.   Lifestyle    Physical activity     Days per week: 5 days     Minutes per session: 20 min    Stress: To some extent   Relationships    Social connections     Talks on phone: More than three times a week     Gets together: Twice a week     Attends [employer] service: Not on file     Active member of club or organization: No     Attends meetings of clubs or organizations: Never     Relationship status: Never    Other Topics Concern    Not on file   Social History Narrative    Not on file     Family History   Problem Relation Age of Onset    Hyperlipidemia Father     Hypertension Father        Travel History: [place], [place], [place](cruise)  Vaccine History: Hep B series (6th grade), Tdap 2/2018, yearly flu vaccine(egg allergy), zostavax 2013  Advanced Directive:   Safer Sex: significant other: Jorgito  Gyn: (Dr. Fields)  Mammogram:  Bone Density:   Colonoscopy:    Review of Systems   From initial visit: this was a review visit  Constitutional: No fever, chills, sweats, fatigue, weakness, weight loss    Eyes: No change in vision, loss of vision, diplopia, photophobia. UTD eye exam    ENT: No sinus drainage, sore throat, mouth pain, or lesions. UTD dental exam    Cardiovascular: No chest pain, CANNON, palpitations or pedal edema    Respiratory: No shortness of breath, CANNON, cough, wheeze, sputum     Gastrointestinal: No abdominal pain, nausea, vomiting, diarrhea,      Genitourinary: No dysuria, hematuria, , or  urethritis    Musculoskeletal: No new pain, joint swelling, or injuries    Integumentary: No new rashes, lesions, or wounds    Neurological: No dizziness, vertigo, unusual headaches, neuropathy, or falls    Psychiatric: understandable amount of anxiety, depression, no memory loss,   or substance abuse    Endocrine: not diabetic.  Thyroid normal    Lymphatic: No lymphadenopathy, blood loss, anemia, or malignancy    Objective:      Blood pressure 110/70, pulse 72, temperature 97.6 °F (36.4 °C), weight 80.7 kg (178 lb), SpO2 98 %. Body mass index is 28.73 kg/m².  Physical Exam    From initial visit  General: Alert and attentive, cooperative and in no distress    Eyes: Pupils equal, round, reactive to light, anicteric, EOMI    Neck: Supple, non-tender, no thyromegaly or masses    ENT: EAC patent, TM normal, nares patent, no oral lesions, teeth in good condition, no thrush    Cardiovascular: Regular rate and rhythm, no murmurs, rubs, or gallop    Respiratory: Lungs clear without wheezes, no rales, rub or rhonci    Gastrointestinal: Active bowel sounds, soft, no mass or organomegaly, no tenderness or distention    Genitourinary: No  flank tenderness    Vascular: No peripheral edema, phlebitis, pulses normal. Warm and well perfused    Musculoskeletal: Ambulates without difficulty, no acute arthritis, synovitis or myositis. Normal muscle bulk and strength    Integumentary: Skin without rashes, lesions, or wounds    AnusRectum:      Neurological: Normal LOC, cranial nerves, speech, reflexes, normal gait    Psychiatric: Normal mood, speech, demeanor    Lymphatic: No cervical, supraclavicular, axillary, or inguinal lymphadenopathy      Wound:     HIV Table:   Toxo IgG  8/17/20 positive  [id number]  8/17/20 negtive  RPR   8/12/20 Non reactive  Hepatitis A IgG  8/12/20 negative  Hepatitis B sAg 8/12/20 negative  Hepatitis B sAb 8/12/20 positive  Hepatitis B cAb 8/12/20 negative  Hepatitis C Ab  8/12/20 negative  Chl/GC  Vitamin  D  20 31, low  TB gold  20 negative  genosure prime 20 incomplete  CD4 initial  20 Over 1000  Initial RNA  20 1500      Recent Diagnostics: reviewed all of the lab results       Assessment and Plan:           HIV infection, unspecified symptom status  -     GenoSure PRIme(R); Future; Expected date: 2020    Encounter for immunization    Other orders  -     Hepatitis A Vaccine (Adult) (IM)       Old lab studies from OB: Angel Angel , [facility] women's  [phone], not yet received     Https://aidsinfo.nih.gov/guidelines/html/3//152/overview    Hepatitis A vaccine #1    Once you are on meds for a little while, we will give prevnar vaccine, then pneumovax, then Menactra    I will call you when the Genosure is available. We discussed initiating therapy with Triumeq.    Flu vaccine late oct/early November or earlier if you start to see flu in your clinic.    Return before year's end.   This note was created using Dragon voice recognition software that occasionally misinterpreted phrases or words.

## 2020-09-17 LAB
LABCORP MISC TEST CODE: NORMAL
LABCORP MISC TEST NAME: NORMAL
LABCORP MISCELLANEOUS TEST: NORMAL

## 2020-09-18 ENCOUNTER — PATIENT MESSAGE (OUTPATIENT)
Dept: INFECTIOUS DISEASES | Facility: CLINIC | Age: 30
End: 2020-09-18

## 2020-09-18 DIAGNOSIS — B20 HIV INFECTION, UNSPECIFIED SYMPTOM STATUS: Primary | ICD-10-CM

## 2020-09-23 ENCOUNTER — LAB VISIT (OUTPATIENT)
Dept: LAB | Facility: HOSPITAL | Age: 30
End: 2020-09-23
Attending: INTERNAL MEDICINE
Payer: COMMERCIAL

## 2020-09-23 DIAGNOSIS — B20 HIV INFECTION, UNSPECIFIED SYMPTOM STATUS: ICD-10-CM

## 2020-09-23 PROCEDURE — 87901 NFCT AGT GNTYP ALYS HIV1 REV: CPT

## 2020-09-23 PROCEDURE — 87906 NFCT AGT GNTYP ALYS HIV1: CPT

## 2020-09-23 PROCEDURE — 36415 COLL VENOUS BLD VENIPUNCTURE: CPT

## 2020-09-24 ENCOUNTER — TELEPHONE (OUTPATIENT)
Dept: INFECTIOUS DISEASES | Facility: CLINIC | Age: 30
End: 2020-09-24

## 2020-09-24 NOTE — TELEPHONE ENCOUNTER
----- Message from Edelmira Ledesma MD sent at 9/24/2020  1:31 PM CDT -----  Please call send out at [facility] and make sure they are doing the genotype to RTI, PI and integrase and not just the integrase

## 2020-09-24 NOTE — TELEPHONE ENCOUNTER
He says all of that is on the send out lab.  Checked the system while I was on the phone.    VIKTORIA Contreras

## 2020-10-05 LAB — HIV GENTYP ISLT: DETECTED

## 2020-10-07 ENCOUNTER — PATIENT MESSAGE (OUTPATIENT)
Dept: INFECTIOUS DISEASES | Facility: CLINIC | Age: 30
End: 2020-10-07

## 2020-10-07 LAB
MISCELLANEOUS TEST NAME: NORMAL
REFERENCE LAB: NORMAL
REFERENCE RANGE: NORMAL
SPECIMEN TYPE: NORMAL
TEST RESULT: NORMAL

## 2020-10-12 ENCOUNTER — PATIENT MESSAGE (OUTPATIENT)
Dept: INFECTIOUS DISEASES | Facility: HOSPITAL | Age: 30
End: 2020-10-12

## 2020-10-12 RX ORDER — ABACAVIR SULFATE, DOLUTEGRAVIR SODIUM, LAMIVUDINE 600; 50; 300 MG/1; MG/1; MG/1
1 TABLET, FILM COATED ORAL DAILY
Qty: 90 TABLET | Refills: 1 | Status: SHIPPED | OUTPATIENT
Start: 2020-10-12 | End: 2020-10-13 | Stop reason: SDUPTHER

## 2020-10-13 ENCOUNTER — TELEPHONE (OUTPATIENT)
Dept: INFECTIOUS DISEASES | Facility: CLINIC | Age: 30
End: 2020-10-13

## 2020-10-13 DIAGNOSIS — B20 HIV INFECTION, UNSPECIFIED SYMPTOM STATUS: Primary | ICD-10-CM

## 2020-10-13 RX ORDER — ABACAVIR SULFATE, DOLUTEGRAVIR SODIUM, LAMIVUDINE 600; 50; 300 MG/1; MG/1; MG/1
1 TABLET, FILM COATED ORAL DAILY
Qty: 90 TABLET | Refills: 1 | Status: SHIPPED | OUTPATIENT
Start: 2020-10-13 | End: 2021-04-08 | Stop reason: SDUPTHER

## 2020-10-13 NOTE — TELEPHONE ENCOUNTER
Says medication needs to sent to one of the partnering pharmacies and her's is [facility].  Check with patient she said this is fine.    VIKTORIA Contreras

## 2020-10-14 ENCOUNTER — PATIENT MESSAGE (OUTPATIENT)
Dept: INFECTIOUS DISEASES | Facility: CLINIC | Age: 30
End: 2020-10-14

## 2020-10-21 ENCOUNTER — PATIENT MESSAGE (OUTPATIENT)
Dept: INFECTIOUS DISEASES | Facility: CLINIC | Age: 30
End: 2020-10-21

## 2020-10-23 ENCOUNTER — SPECIALTY PHARMACY (OUTPATIENT)
Dept: PHARMACY | Facility: CLINIC | Age: 30
End: 2020-10-23

## 2020-10-23 NOTE — TELEPHONE ENCOUNTER
Specialty Pharmacy - Initial Clinical Assessment    Specialty Medication Orders Linked to Encounter      Most Recent Value   Medication #1  abacavir-dolutegravir-lamivud (TRIUMEQ) 600- mg Tab (Order#[id number], Rx#[id number])        Sierra Vick is a 30 y.o. female, who is followed by the specialty pharmacy service for management and education.    Encounters since last clinical assessment   No encounters found.   Clinical call attempts since last clinical assessment   10/23/2020  7:00 PM - Specialty Pharmacy - Clinical Assessment by Joycelyn Gibbs PharmD     Today she received education before her first fill with [facility].    Current Outpatient Medications   Medication Sig    abacavir-dolutegravir-lamivud (TRIUMEQ) 600- mg Tab Take 1 tablet by mouth once daily.    sertraline (ZOLOFT) 100 MG tablet Take 1.5 tablets (150 mg total) by mouth once daily.   Last reviewed on 9/9/2020 11:26 AM by Anahy Pierre MA    Review of patient's allergies indicates:   Allergen Reactions    Eggs [egg derived] Nausea Only    Ondansetron Dermatitis    Sulfa (sulfonamide antibiotics) Nausea Only    Sulfamethoxazole-trimethoprim Diarrhea   Last reviewed on  10/13/2020 5:52 PM by Edelmira Ledesma    Drug Interactions    Drug interactions evaluated: yes  Clinically relevant drug interactions identified: no  Provided the patient with educational material regarding drug interactions: not applicable         Adverse Effects    *All other systems reviewed and are negative       Assessment Questions - Documented Responses      Most Recent Value   Assessment   Medication Reconciliation completed for patient  Yes   During the past 4 weeks, has patient missed any activities due to condition or medication?  No   During the past 4 weeks, did patient have any of the following urgent care visits?  None   Goals of Therapy Status  Discussed (new start)   Welcome packet contents reviewed and  "discussed with patient?  Yes   Assesment completed?  Yes   Plan  Therapy being initiated   Do you need to open a clinical intervention (i-vent)?  No   Do you want to schedule first shipment?  Yes   Medication #1 Assessment Info   Patient status  New medication   Is this medication appropriate for the patient?  Yes   Is this medication effective?  Not yet started        Refill Questions - Documented Responses      Most Recent Value   Relationship to patient of person spoken to?  Self   HIPAA/medical authority confirmed?  Yes   Can the patient store medication/sharps container properly (at the correct temperature, away from children/pets, etc.)?  Yes   Can the patient call emergency services (911) in the event of an emergency?  Yes   Does the patient have any concerns or questions about taking or administering this medication as prescribed?  No   How many doses does the patient have on hand?  0   How will the patient receive the medication?  Mail   When does the patient need to receive the medication?  10/27/20   Shipping Address  Home   Address in [facility] confirmed and updated if neccessary?  Yes   Expected Copay ($)  0   Is the patient able to afford the medication copay?  Yes   Payment Method  zero copay   Days supply of Refill  30   Refill activity completed?  Yes   Refill activity plan  Refill scheduled   Shipment/Pickup Date:  10/26/20          Objective    She has a past medical history of Wrist fracture.    Tried/failed medications: NONE    BP Readings from Last 4 Encounters:   09/09/20 110/70   08/17/20 126/86   08/11/20 122/76   07/28/20 124/68     Ht Readings from Last 4 Encounters:   08/17/20 5' 6" (1.676 m)   08/11/20 5' 6" (1.676 m)   07/07/20 5' 6" (1.676 m)   06/30/16 5' 6" (1.676 m)     Wt Readings from Last 4 Encounters:   09/09/20 80.7 kg (178 lb)   08/17/20 78.5 kg (173 lb)   08/11/20 79.5 kg (175 lb 4.3 oz)   07/28/20 80.1 kg (176 lb 9.4 oz)         Goals of Therapy Status: Discussed " (new start)    Assessment/Plan  Patient plans to continue therapy without changes      Indication, dosage, appropriateness, effectiveness, safety and convenience of her specialty medication(s) were reviewed today.     Patient Counseling    Counseled the patient on the following: doses and administration discussed, safe handling, storage, and disposal discussed, possible adverse effects and management discussed, possible drug and prescription drug interactions discussed, possible drug and OTC drug and food interactions discussed, lab monitoring and follow-up discussed, therapeutic rationale discussed, cost of medications and cost implications discussed, adherence and missed doses discussed, pharmacy contact information discussed       Initial Triumeq consult completed on 10/23. Triumeq will be shipped on 10/26 to arrive at patient's home on 10/27 via [employer]. $ 0.00 copay. Address confirmed. Confirmed 2 patient identifiers - name and . Therapy Appropriate.  Patient will start Triumeq on 16.    Triumeq [id number]- Take one tablet by mouth once daily.  Counseling was reviewed:   1. Patient MUST take Triumeq at the SAME time every day with or without food. Take with food if stomach irritation occurs.   2. Side effects include, but not limited to: fatigue/weakness, insomnia, and headaches.   Contact MD if any signs of allergic reaction, kidney problems (weight gain, urine changes, blood in urine), liver problems (dark urine/light stools, jaundice, abdominal pains), depression, bone pain, muscle pain/weakness, change in body fat, and signs of infection.   4. Medication list reviewed. No DDIs or allergies noted. Patient MUST contact myself or provider prior to starting any new OTC, herbal, or prescription drugs to avoid potential DDIs.    -Avoid alcohol   -Space antacids 2 hours before or 6 hours after    This drug does not stop the spread of diseases like HIV that are passed through blood or having sex. Do not  have any kind of sex without using a latex or polyurethane condom. Do not share needles or other things like toothbrushes or razors. Talk with your doctor.  Tell your doctor if you are pregnant or plan on getting pregnant. You will need to talk about the benefits and risks of using this drug while you are pregnant.    Discussed the importance of staying well hydrated while on therapy. Compliance stressed - patient to take missed doses as soon as remembered, but NOT to take 2 doses in one day. Patient will report questions or concerns to myself or practitioner. Patient verbalizes understanding. Patient plans to start Triumeq on 10/27. Consultation included: indication; goals of treatment; administration; storage and handling; side effects; how to handle side effects; the importance of compliance; how to handle missed doses; the importance of laboratory monitoring; the importance of keeping all follow up appointments.  Patient understands to report any medication changes to [facility] and provider. All questions answered and addressed to patients satisfaction. I will f/u with her in 1 week from start, [facility] to contact patient in 3 weeks for refills.       Tasks added this encounter   11/15/2020 - Refill Call (Auto Added)   Tasks due within next 3 months   No tasks due.     Joycelyn Gibbs, PharmD  [facility]  [address] [facility] [address]  Phone: [phone]  Fax: [phone]

## 2020-10-25 DIAGNOSIS — J01.90 ACUTE SINUSITIS WITH COEXISTING CONDITION, NEED PROPHYLACTIC TREATMENT: Primary | ICD-10-CM

## 2020-10-25 RX ORDER — AMOXICILLIN AND CLAVULANATE POTASSIUM 875; 125 MG/1; MG/1
1 TABLET, FILM COATED ORAL 2 TIMES DAILY
Qty: 20 TABLET | Refills: 0 | Status: SHIPPED | OUTPATIENT
Start: 2020-10-25 | End: 2020-11-04

## 2020-10-25 RX ORDER — METHYLPREDNISOLONE 4 MG/1
TABLET ORAL
Qty: 1 PACKAGE | Refills: 0 | Status: SHIPPED | OUTPATIENT
Start: 2020-10-25 | End: 2020-11-10

## 2020-10-27 ENCOUNTER — PATIENT MESSAGE (OUTPATIENT)
Dept: INFECTIOUS DISEASES | Facility: CLINIC | Age: 30
End: 2020-10-27

## 2020-10-27 NOTE — TELEPHONE ENCOUNTER
Says she will call the patient and let her know what's going on.    Dariela Frank [facility]

## 2020-10-28 ENCOUNTER — PATIENT MESSAGE (OUTPATIENT)
Dept: INFECTIOUS DISEASES | Facility: CLINIC | Age: 30
End: 2020-10-28

## 2020-11-10 ENCOUNTER — OFFICE VISIT (OUTPATIENT)
Dept: FAMILY MEDICINE | Facility: CLINIC | Age: 30
End: 2020-11-10
Payer: COMMERCIAL

## 2020-11-10 VITALS
WEIGHT: 182.13 LBS | HEART RATE: 82 BPM | BODY MASS INDEX: 29.27 KG/M2 | TEMPERATURE: 98 F | DIASTOLIC BLOOD PRESSURE: 72 MMHG | RESPIRATION RATE: 18 BRPM | SYSTOLIC BLOOD PRESSURE: 120 MMHG | OXYGEN SATURATION: 97 % | HEIGHT: 66 IN

## 2020-11-10 DIAGNOSIS — G43.809 OTHER MIGRAINE WITHOUT STATUS MIGRAINOSUS, NOT INTRACTABLE: Primary | ICD-10-CM

## 2020-11-10 DIAGNOSIS — R11.0 NAUSEA: ICD-10-CM

## 2020-11-10 PROCEDURE — 3008F PR BODY MASS INDEX (BMI) DOCUMENTED: ICD-10-PCS | Mod: CPTII,S$GLB,, | Performed by: PHYSICIAN ASSISTANT

## 2020-11-10 PROCEDURE — 3008F BODY MASS INDEX DOCD: CPT | Mod: CPTII,S$GLB,, | Performed by: PHYSICIAN ASSISTANT

## 2020-11-10 PROCEDURE — 99213 OFFICE O/P EST LOW 20 MIN: CPT | Mod: S$GLB,,, | Performed by: PHYSICIAN ASSISTANT

## 2020-11-10 PROCEDURE — 99999 PR PBB SHADOW E&M-EST. PATIENT-LVL III: ICD-10-PCS | Mod: PBBFAC,,, | Performed by: PHYSICIAN ASSISTANT

## 2020-11-10 PROCEDURE — 99999 PR PBB SHADOW E&M-EST. PATIENT-LVL III: CPT | Mod: PBBFAC,,, | Performed by: PHYSICIAN ASSISTANT

## 2020-11-10 PROCEDURE — 99213 PR OFFICE/OUTPT VISIT, EST, LEVL III, 20-29 MIN: ICD-10-PCS | Mod: S$GLB,,, | Performed by: PHYSICIAN ASSISTANT

## 2020-11-10 RX ORDER — SUMATRIPTAN SUCCINATE 100 MG/1
100 TABLET ORAL
Qty: 9 TABLET | Refills: 3 | Status: SHIPPED | OUTPATIENT
Start: 2020-11-10 | End: 2021-12-21

## 2020-11-10 RX ORDER — PROMETHAZINE HYDROCHLORIDE 12.5 MG/1
12.5 TABLET ORAL 4 TIMES DAILY
Qty: 30 TABLET | Refills: 0 | Status: SHIPPED | OUTPATIENT
Start: 2020-11-10 | End: 2021-03-18

## 2020-11-10 RX ORDER — BUTALBITAL, ACETAMINOPHEN AND CAFFEINE 50; 325; 40 MG/1; MG/1; MG/1
1 TABLET ORAL EVERY 4 HOURS PRN
Qty: 10 TABLET | Refills: 2 | Status: SHIPPED | OUTPATIENT
Start: 2020-11-10 | End: 2020-12-10

## 2020-11-10 RX ORDER — NAPROXEN 375 MG/1
375 TABLET ORAL 2 TIMES DAILY WITH MEALS
Qty: 20 TABLET | Refills: 3 | Status: SHIPPED | OUTPATIENT
Start: 2020-11-10 | End: 2021-10-18

## 2020-11-10 NOTE — PROGRESS NOTES
Subjective:       Patient ID: Veronique Vick is a 30 y.o. female.    Chief Complaint: Follow-up (refills ) and Headache     is a 30 year old patient with migraines. She reports since getting her IUD she has had more migraines. She needs a refill of her fioricet at this time. She also reports to increased nausea with migraines. She also needs a refill on phenergan.     Review of patient's allergies indicates:   Allergen Reactions    Eggs [egg derived] Nausea Only    Ondansetron Dermatitis    Sulfa (sulfonamide antibiotics) Nausea Only    Sulfamethoxazole-trimethoprim Diarrhea         Current Outpatient Medications:     abacavir-dolutegravir-lamivud (TRIUMEQ) 600- mg Tab, Take 1 tablet by mouth once daily., Disp: 90 tablet, Rfl: 1    sertraline (ZOLOFT) 100 MG tablet, Take 1.5 tablets (150 mg total) by mouth once daily., Disp: 120 tablet, Rfl: 1    promethazine (PHENERGAN) 12.5 MG Tab, Take 1 tablet (12.5 mg total) by mouth 4 (four) times daily., Disp: 30 tablet, Rfl: 0    Lab Results   Component Value Date    WBC 4.55 08/12/2020    HGB 12.8 08/12/2020    HCT 39.4 08/12/2020     08/12/2020    CHOL 155 08/12/2020    TRIG 68 08/12/2020    HDL 42 08/12/2020    ALT 14 08/12/2020    AST 14 08/12/2020     08/12/2020    K 3.9 08/12/2020     08/12/2020    CREATININE 0.8 08/12/2020    BUN 8 08/12/2020    CO2 22 (L) 08/12/2020    TSH 0.584 08/12/2020       Review of Systems   Constitutional: Negative for activity change, appetite change and fever.   HENT: Negative for postnasal drip, rhinorrhea and sinus pressure.    Eyes: Negative for visual disturbance.   Respiratory: Negative for cough and shortness of breath.    Cardiovascular: Negative for chest pain.   Gastrointestinal: Positive for nausea. Negative for abdominal distention and abdominal pain.   Genitourinary: Negative for difficulty urinating and dysuria.   Musculoskeletal: Negative for arthralgias and myalgias.    Neurological: Positive for headaches.   Hematological: Negative for adenopathy.   Psychiatric/Behavioral: The patient is not nervous/anxious.        Objective:      Physical Exam  Constitutional:       Appearance: Normal appearance.   HENT:      Head: Normocephalic and atraumatic.      Mouth/Throat:      Pharynx: No oropharyngeal exudate.   Eyes:      Conjunctiva/sclera: Conjunctivae normal.      Pupils: Pupils are equal, round, and reactive to light.   Cardiovascular:      Rate and Rhythm: Normal rate and regular rhythm.      Heart sounds: Normal heart sounds.   Pulmonary:      Effort: Pulmonary effort is normal.      Breath sounds: Normal breath sounds. No wheezing.   Abdominal:      General: Abdomen is flat. There is no distension.      Palpations: Abdomen is soft.      Tenderness: There is no abdominal tenderness.   Skin:     Findings: No erythema.   Neurological:      Mental Status: She is alert and oriented to person, place, and time.   Psychiatric:         Behavior: Behavior normal.         Assessment:       1. Other migraine without status migrainosus, not intractable    2. Nausea        Plan:       Veronique was seen today for follow-up and headache.    Diagnoses and all orders for this visit:    Other migraine without status migrainosus, not intractable  fioricet refill sent to Dr. Gonzalez.   Nausea  -     promethazine (PHENERGAN) 12.5 MG Tab; Take 1 tablet (12.5 mg total) by mouth 4 (four) times daily.

## 2020-11-19 ENCOUNTER — SPECIALTY PHARMACY (OUTPATIENT)
Dept: PHARMACY | Facility: CLINIC | Age: 30
End: 2020-11-19

## 2020-11-19 NOTE — TELEPHONE ENCOUNTER
Specialty Pharmacy - Refill Coordination    Specialty Medication Orders Linked to Encounter      Most Recent Value   Medication #1  abacavir-dolutegravir-lamivud (TRIUMEQ) 600- mg Tab (Order#[id number], Rx#[id number])          Refill Questions - Documented Responses      Most Recent Value   Relationship to patient of person spoken to?  Self   HIPAA/medical authority confirmed?  Yes   Any changes in contact preferences or allowed representatives?  No   Has the patient had any insurance changes?  No   Has the patient had any changes to specialty medication, dose, or instructions?  No   Has the patient started taking any new medications, herbals, or supplements?  No   Has the patient been diagnosed with any new medical conditions?  No   Does the patient have any new allergies to medications or foods?  No   Does the patient have any concerns about side effects?  No   Can the patient store medication/sharps container properly (at the correct temperature, away from children/pets, etc.)?  Yes   Can the patient call emergency services (911) in the event of an emergency?  Yes   Does the patient have any concerns or questions about taking or administering this medication as prescribed?  No   How many doses did the patient miss in the past 4 weeks or since the last fill?  0   How many doses does the patient have on hand?  8   How many days does the patient report on hand quantity will last?  8   Does the number of doses/days supply remaining match pharmacy expected amounts?  Yes   Does the patient feel that this medication is effective?  Yes   How will the patient receive the medication?  Mail   When does the patient need to receive the medication?  11/28/20   Shipping Address  Home   Address in [facility] confirmed and updated if neccessary?  Yes   Expected Copay ($)  0   Is the patient able to afford the medication copay?  Yes   Payment Method  zero copay   Days supply of Refill  30   Would patient like to speak  to a pharmacist?  No   Do you want to trigger an intervention?  No   Do you want to trigger an additional referral task?  No   Refill activity completed?  Yes   Refill activity plan  Refill scheduled   Shipment/Pickup Date:  11/24/20          Current Outpatient Medications   Medication Sig    abacavir-dolutegravir-lamivud (TRIUMEQ) 600- mg Tab Take 1 tablet by mouth once daily.    butalbital-acetaminophen-caffeine -40 mg (FIORICET, ESGIC) -40 mg per tablet Take 1 tablet by mouth every 4 (four) hours as needed for Pain.    naproxen (NAPROSYN) 375 MG tablet Take 1 tablet (375 mg total) by mouth 2 (two) times daily with meals.    promethazine (PHENERGAN) 12.5 MG Tab Take 1 tablet (12.5 mg total) by mouth 4 (four) times daily.    sertraline (ZOLOFT) 100 MG tablet Take 1.5 tablets (150 mg total) by mouth once daily.    sumatriptan (IMITREX) 100 MG tablet Take 1 tablet (100 mg total) by mouth every 2 (two) hours as needed for Migraine (Max 2 tab as needed in 24 hrs).   Last reviewed on 11/19/2020  2:46 PM by Pattie Cruz    Review of patient's allergies indicates:   Allergen Reactions    Eggs [egg derived] Nausea Only    Ondansetron Dermatitis    Sulfa (sulfonamide antibiotics) Nausea Only    Sulfamethoxazole-trimethoprim Diarrhea    Last reviewed on  11/19/2020 2:46 PM by Pattie Cruz      Tasks added this encounter   No tasks added.   Tasks due within next 3 months   11/15/2020 - Refill Call (Auto Added)     Pattie Cruz  [facility]  [address] [place] [id number]  Phone: [phone]  Fax: [phone]

## 2020-11-23 ENCOUNTER — PATIENT MESSAGE (OUTPATIENT)
Dept: INFECTIOUS DISEASES | Facility: CLINIC | Age: 30
End: 2020-11-23

## 2020-12-07 ENCOUNTER — OFFICE VISIT (OUTPATIENT)
Dept: INFECTIOUS DISEASES | Facility: CLINIC | Age: 30
End: 2020-12-07
Payer: COMMERCIAL

## 2020-12-07 VITALS
HEIGHT: 66 IN | HEART RATE: 84 BPM | TEMPERATURE: 98 F | BODY MASS INDEX: 29.15 KG/M2 | DIASTOLIC BLOOD PRESSURE: 81 MMHG | SYSTOLIC BLOOD PRESSURE: 121 MMHG | OXYGEN SATURATION: 98 % | WEIGHT: 181.38 LBS

## 2020-12-07 DIAGNOSIS — Z79.899 ENCOUNTER FOR LONG-TERM (CURRENT) USE OF MEDICATIONS: ICD-10-CM

## 2020-12-07 DIAGNOSIS — Z23 ENCOUNTER FOR IMMUNIZATION: ICD-10-CM

## 2020-12-07 DIAGNOSIS — B20 HIV INFECTION, UNSPECIFIED SYMPTOM STATUS: Primary | ICD-10-CM

## 2020-12-07 PROCEDURE — 90670 PNEUMOCOCCAL CONJUGATE VACCINE 13-VALENT LESS THAN 5YO & GREATER THAN: ICD-10-PCS | Mod: S$GLB,,, | Performed by: INTERNAL MEDICINE

## 2020-12-07 PROCEDURE — 99214 PR OFFICE/OUTPT VISIT, EST, LEVL IV, 30-39 MIN: ICD-10-PCS | Mod: 25,S$GLB,, | Performed by: INTERNAL MEDICINE

## 2020-12-07 PROCEDURE — 1126F AMNT PAIN NOTED NONE PRSNT: CPT | Mod: S$GLB,,, | Performed by: INTERNAL MEDICINE

## 2020-12-07 PROCEDURE — 90471 IMMUNIZATION ADMIN: CPT | Mod: S$GLB,,, | Performed by: INTERNAL MEDICINE

## 2020-12-07 PROCEDURE — 90670 PCV13 VACCINE IM: CPT | Mod: S$GLB,,, | Performed by: INTERNAL MEDICINE

## 2020-12-07 PROCEDURE — 90471 PNEUMOCOCCAL CONJUGATE VACCINE 13-VALENT LESS THAN 5YO & GREATER THAN: ICD-10-PCS | Mod: S$GLB,,, | Performed by: INTERNAL MEDICINE

## 2020-12-07 PROCEDURE — 99214 OFFICE O/P EST MOD 30 MIN: CPT | Mod: 25,S$GLB,, | Performed by: INTERNAL MEDICINE

## 2020-12-07 PROCEDURE — 1126F PR PAIN SEVERITY QUANTIFIED, NO PAIN PRESENT: ICD-10-PCS | Mod: S$GLB,,, | Performed by: INTERNAL MEDICINE

## 2020-12-07 RX ORDER — BUSPIRONE HYDROCHLORIDE 5 MG/1
5 TABLET ORAL 2 TIMES DAILY
Qty: 60 TABLET | Refills: 11 | Status: SHIPPED | OUTPATIENT
Start: 2020-12-07 | End: 2021-10-18

## 2020-12-07 NOTE — PATIENT INSTRUCTIONS
Hepatitis A #2 in march    prevnar now  Then at least 6 weeks from now,  then pneumovax, then Menactra next year    Continue triumeq    Viral load next week

## 2020-12-07 NOTE — PROGRESS NOTES
Subjective:       Patient ID: Veronique Vick is a 30 y.o. female.    Chief Complaint::   HIV Positive/AIDS    Her Source of exposure learned that he was positive last November and was on treatment but did not reveal his diagnosis until after their last encounter in April 2020.   Her last test was probably during pregnancy for her 2 year old who exhibits no signs of infection .   Recent serologies were reviewed.   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 The Genosure prime was not completed and will be redrawn. We reviewed the [facility] guidelines for treatment in pregnant women and have come to a tentative agreement to use Triumeq. She learned that the source of her infection was on Biktarvy at the time.    12/7/20: tolerating triumeq, as long as she takes it with food. I have not received records from prior GYN, but can see a fair amount in care everywhere. She has 4 month pap repeated this week. She took her flu vaccine. Having anxiety, regarding her ex , requests something for anxiety, non habit forming. She agrees to Buspar.     Current Outpatient Medications:     abacavir-dolutegravir-lamivud (TRIUMEQ) 600- mg Tab, Take 1 tablet by mouth once daily., Disp: 90 tablet, Rfl: 1    butalbital-acetaminophen-caffeine -40 mg (FIORICET, ESGIC) -40 mg per tablet, Take 1 tablet by mouth every 4 (four) hours as needed for Pain., Disp: 10 tablet, Rfl: 2    naproxen (NAPROSYN) 375 MG tablet, Take 1 tablet (375 mg total) by mouth 2 (two) times daily with meals. (Patient taking differently: Take 375 mg by mouth after meals as needed. ), Disp: 20 tablet, Rfl:  3    promethazine (PHENERGAN) 12.5 MG Tab, Take 1 tablet (12.5 mg total) by mouth 4 (four) times daily. (Patient taking differently: Take 12.5 mg by mouth every 4 (four) hours as needed. ), Disp: 30 tablet, Rfl: 0    sertraline (ZOLOFT) 100 MG tablet, Take 1.5 tablets (150 mg total) by mouth once daily., Disp: 120 tablet, Rfl: 1    sumatriptan (IMITREX) 100 MG tablet, Take 1 tablet (100 mg total) by mouth every 2 (two) hours as needed for Migraine (Max 2 tab as needed in 24 hrs)., Disp: 9 tablet, Rfl: 3    busPIRone (BUSPAR) 5 MG Tab, Take 1 tablet (5 mg total) by mouth 2 (two) times daily., Disp: 60 tablet, Rfl: 11  Review of patient's allergies indicates:   Allergen Reactions    Eggs [egg derived] Nausea Only    Ondansetron Dermatitis    Sulfa (sulfonamide antibiotics) Nausea Only    Sulfamethoxazole-trimethoprim Diarrhea     Past Medical History:   Diagnosis Date    Wrist fracture     left     Past Surgical History:   Procedure Laterality Date    ORIF RADIUS & ULNA FRACTURES Left 2016    WISDOM TOOTH EXTRACTION       Social History     Socioeconomic History    Marital status: Single     Spouse name: Not on file    Number of children: Not on file    Years of education: Not on file    Highest education level: Not on file   Occupational History    Not on file   Social Needs    Financial resource strain: Not hard at all    Food insecurity     Worry: Never true     Inability: Never true    Transportation needs     Medical: No     Non-medical: No   Tobacco Use    Smoking status: Never Smoker    Smokeless tobacco: Never Used   Substance and Sexual Activity    Alcohol use: Yes     Alcohol/week: 0.0 standard drinks     Frequency: 2-4 times a month     Drinks per session: 1 or 2     Binge frequency: Never     Comment: social    Drug use: No    Sexual activity: Yes     Partners: Male     Birth control/protection: I.U.D.   Lifestyle    Physical activity     Days per week: 5 days     Minutes per  session: 20 min    Stress: To some extent   Relationships    Social connections     Talks on phone: More than three times a week     Gets together: Twice a week     Attends Mandaen service: Not on file     Active member of club or organization: No     Attends meetings of clubs or organizations: Never     Relationship status: Never    Other Topics Concern    Not on file   Social History Narrative    Not on file     Family History   Problem Relation Age of Onset    Hyperlipidemia Father     Hypertension Father        Travel History: [place], [place], [place](cruise)  Vaccine History: Hep B series (6th grade), Tdap 2/2018, yearly flu vaccine(egg allergy), zostavax 2013  Advanced Directive:   Safer Sex: significant other: Jorgito  Gyn: (Dr. Fields)  Mammogram:  Bone Density:   Colonoscopy:    Review of Systems      Constitutional: No fever, chills, sweats, fatigue, weakness, weight loss. No regular exercise, but raising a small child    Eyes: No change in vision, loss of vision,  . UTD eye exam    ENT: No sinus drainage, sore throat, mouth pain, or lesions. UTD dental exam    Cardiovascular: No chest pain, CANNON, palpitations or pedal edema    Respiratory: No shortness of breath, CANNON, cough, wheeze, sputum     Gastrointestinal: No abdominal pain, nausea, vomiting, diarrhea,  Except when she takes triumeq without adequate food in her stomach.    Genitourinary: No dysuria, hematuria, , or urethritis    Musculoskeletal: No new pain, joint swelling, or injuries    Integumentary: No new rashes, lesions, or wounds    Neurological: No dizziness, vertigo, unusual headaches, neuropathy, or falls    Psychiatric: understandable amount of anxiety, depression, no memory loss,   or substance abuse    Endocrine: not diabetic.  Thyroid normal    Lymphatic: No lymphadenopathy, blood loss, anemia, or malignancy    Objective:      Blood pressure 121/81, pulse 84, temperature 97.6 °F (36.4 °C), temperature source Temporal, height  "5' 6" (1.676 m), weight 82.3 kg (181 lb 6.4 oz), last menstrual period 11/09/2020, SpO2 98 %. Body mass index is 29.28 kg/m².  Physical Exam    From initial visit  General: Alert and attentive, cooperative and in no distress    Eyes: Pupils equal, round, reactive to light, anicteric, EOMI    Neck: Supple, non-tender, no thyromegaly or masses    ENT: EAC patent, TM normal, nares patent, no oral lesions, teeth in good condition, no thrush    Cardiovascular: Regular rate and rhythm, no murmurs, rubs, or gallop    Respiratory: Lungs clear without wheezes, no rales, rub or rhonci    Gastrointestinal:      Genitourinary:      Vascular: No peripheral edema, phlebitis, pulses normal. Warm and well perfused    Musculoskeletal: Ambulates without difficulty, no acute arthritis, synovitis or myositis. Normal muscle bulk and strength    Integumentary: Skin without rashes, lesions, or wounds    AnusRectum:      Neurological: Normal LOC, cranial nerves, speech, reflexes, normal gait    Psychiatric: Normal mood, speech, demeanor    Lymphatic: No cervical, supraclavicular, axillary  lymphadenopathy      Wound:     HIV Table:   Toxo IgG  8/17/20 positive  [id number]  8/17/20 negtive  RPR   8/12/20 Non reactive  Hepatitis A IgG  8/12/20 negative  Hepatitis B sAg 8/12/20 negative  Hepatitis B sAb 8/12/20 positive  Hepatitis B cAb 8/12/20 negative  Hepatitis C Ab  8/12/20 negative  Chl/GC  Vitamin D  8/12/20 31, low  TB gold  8/17/20 negative  genosure prime 8/17/20 incomplete  CD4 initial  8/17/20 Over 1000  Initial RNA  8/17/20 1500      Recent Diagnostics: reviewed all of the lab results       Assessment and Plan:           HIV infection, unspecified symptom status  -     HIV RNA, Quantitative, PCR; Future; Expected date: 12/07/2020    Encounter for immunization    Encounter for long-term (current) use of medications    Other orders  -     (In Office Administered) Pneumococcal Conjugate Vaccine (13 Valent) (IM)  -     busPIRone " (BUSPAR) 5 MG Tab; Take 1 tablet (5 mg total) by mouth 2 (two) times daily.  Dispense: 60 tablet; Refill: 11      Hepatitis A #2 in march    prevnar now  Then at least 6 weeks from now,  then pneumovax, then Menactra next year    Continue triumeq    Viral load next week     This note was created using Dragon voice recognition software that occasionally misinterpreted phrases or words.

## 2020-12-09 ENCOUNTER — OFFICE VISIT (OUTPATIENT)
Dept: OBSTETRICS AND GYNECOLOGY | Facility: CLINIC | Age: 30
End: 2020-12-09
Payer: COMMERCIAL

## 2020-12-09 VITALS
BODY MASS INDEX: 29.12 KG/M2 | HEIGHT: 66 IN | WEIGHT: 181.19 LBS | DIASTOLIC BLOOD PRESSURE: 64 MMHG | SYSTOLIC BLOOD PRESSURE: 124 MMHG

## 2020-12-09 DIAGNOSIS — Z87.42 HISTORY OF ABNORMAL CERVICAL PAP SMEAR: Primary | ICD-10-CM

## 2020-12-09 PROCEDURE — 88175 CYTOPATH C/V AUTO FLUID REDO: CPT

## 2020-12-09 PROCEDURE — 3008F BODY MASS INDEX DOCD: CPT | Mod: CPTII,S$GLB,, | Performed by: OBSTETRICS & GYNECOLOGY

## 2020-12-09 PROCEDURE — 99213 PR OFFICE/OUTPT VISIT, EST, LEVL III, 20-29 MIN: ICD-10-PCS | Mod: S$GLB,,, | Performed by: OBSTETRICS & GYNECOLOGY

## 2020-12-09 PROCEDURE — 99999 PR PBB SHADOW E&M-EST. PATIENT-LVL III: ICD-10-PCS | Mod: PBBFAC,,, | Performed by: OBSTETRICS & GYNECOLOGY

## 2020-12-09 PROCEDURE — 1126F PR PAIN SEVERITY QUANTIFIED, NO PAIN PRESENT: ICD-10-PCS | Mod: S$GLB,,, | Performed by: OBSTETRICS & GYNECOLOGY

## 2020-12-09 PROCEDURE — 1126F AMNT PAIN NOTED NONE PRSNT: CPT | Mod: S$GLB,,, | Performed by: OBSTETRICS & GYNECOLOGY

## 2020-12-09 PROCEDURE — 3008F PR BODY MASS INDEX (BMI) DOCUMENTED: ICD-10-PCS | Mod: CPTII,S$GLB,, | Performed by: OBSTETRICS & GYNECOLOGY

## 2020-12-09 PROCEDURE — 99999 PR PBB SHADOW E&M-EST. PATIENT-LVL III: CPT | Mod: PBBFAC,,, | Performed by: OBSTETRICS & GYNECOLOGY

## 2020-12-09 PROCEDURE — 99213 OFFICE O/P EST LOW 20 MIN: CPT | Mod: S$GLB,,, | Performed by: OBSTETRICS & GYNECOLOGY

## 2020-12-09 NOTE — PROGRESS NOTES
"Chief Complaint   Patient presents with    pap #1       History of Present Illness   30 y.o.  female patient presents today for dx PAP #1, LGSIL PAP 7/2020 with negative colpo, newly diagnosed HIV since last visit- on meds.     Past medical and surgical history reviewed.   I have reviewed the patient's medical history in detail and updated the computerized patient record.    Review of patient's allergies indicates:   Allergen Reactions    Eggs [egg derived] Nausea Only    Ondansetron Dermatitis    Sulfa (sulfonamide antibiotics) Nausea Only    Sulfamethoxazole-trimethoprim Diarrhea         Review of Systems - Negative except HPI  GEN ROS: negative for - chills or fever  Breast ROS: negative for breast lumps  Genito-Urinary ROS: no dysuria, trouble voiding, or hematuria      Physical Examination:  /64   Ht 5' 6" (1.676 m)   Wt 82.2 kg (181 lb 3.5 oz)   LMP 12/04/2020   BMI 29.25 kg/m²    Constitutional: She appears alert and responsive. She appears well-developed, well-groomed, and well-nourished. No distress. Thin  HENT:   Head: Normocephalic and atraumatic.   Eyes: Conjunctivae and EOM are normal. No scleral icterus.   Neck: Symmetrical. Normal range of motion. Neck supple. No tracheal deviation present. THYROID: without masses or tenderness.  Cardiovascular: Normal rate, no rhythm abnormality noted. Extremities without swelling or edema, warm.    Pulmonary/Chest: Normal respiratory Effort. No distress or retractions. She exhibits no tenderness.  Breasts: are symmetrical.   Right breast exhibits no inverted nipple, no mass, no nipple discharge, no skin change and no tenderness.   Left breast exhibits no inverted nipple, no mass, no nipple discharge, no skin change and no tenderness.  Abdominal: Soft. She exhibits no distension, hernias or masses. There is no tenderness. No enlargement of liver edge or spleen.  There is no rebound and no guarding.   Genitourinary:    External rectal exam " shows no thrombosed external hemorrhoids, no lesions.     Pelvic exam was performed with patient supine.   No labial fusion, and symmetrical.    There is no rash, lesion or injury on the right labia.   There is no rash, lesion or injury on the left labia.   No bleeding and no signs of injury around the vaginal introitus, urethral meatus is normal size and without prolapse or lesions, urethra well supported. The cervix is visualized with no discharge, lesions or friability.IUD string in place.    No vaginal discharge found.    No significant Cystocele, Enterocele or rectocele, and cervix and uterus well supported.   Bimanual exam:   The urethra is normal to palpation and there are no palpable vaginal wall masses.   Uterus is not deviated, not enlarged, not fixed, normal shape and not tender.   Cervix exhibits no motion tenderness.    Right adnexum displays no mass or nodularity and no tenderness.   Left adnexum displays no mass or nodularity and no tenderness.  Musculoskeletal: Normal range of motion.   Lymphadenopathy: No inguinal adenopathy present.   Neurological: She is alert and oriented to person, place, and time. Coordination normal.   Skin: Skin is warm and dry. She is not diaphoretic. No rashes, lesions or ulcers.   Psychiatric: She has a normal mood and affect, oriented to person, place, and time.              Assessment:  LGSIL PAP, neg colpo  New HIV diagnosis - counseled  1. History of abnormal cervical Pap smear  Liquid-Based Pap Smear, Diagnostic       Plan:  PAP #1 today, then q 4 months  Follow up with ID as scheduled.   Patient informed will be contacted with results within 2 weeks. Encouraged to please call back or email if she has not heard from us by then.

## 2020-12-17 ENCOUNTER — IMMUNIZATION (OUTPATIENT)
Dept: PRIMARY CARE CLINIC | Facility: CLINIC | Age: 30
End: 2020-12-17
Payer: COMMERCIAL

## 2020-12-17 ENCOUNTER — LAB VISIT (OUTPATIENT)
Dept: LAB | Facility: HOSPITAL | Age: 30
End: 2020-12-17
Attending: INTERNAL MEDICINE
Payer: COMMERCIAL

## 2020-12-17 DIAGNOSIS — B20 HIV INFECTION, UNSPECIFIED SYMPTOM STATUS: ICD-10-CM

## 2020-12-17 DIAGNOSIS — Z23 NEED FOR VACCINATION: ICD-10-CM

## 2020-12-17 PROCEDURE — 91300 COVID-19, MRNA, LNP-S, PF, 30 MCG/0.3 ML DOSE VACCINE: CPT | Mod: S$GLB,,, | Performed by: FAMILY MEDICINE

## 2020-12-17 PROCEDURE — 0001A COVID-19, MRNA, LNP-S, PF, 30 MCG/0.3 ML DOSE VACCINE: ICD-10-PCS | Mod: CV19,S$GLB,, | Performed by: FAMILY MEDICINE

## 2020-12-17 PROCEDURE — 91300 COVID-19, MRNA, LNP-S, PF, 30 MCG/0.3 ML DOSE VACCINE: ICD-10-PCS | Mod: S$GLB,,, | Performed by: FAMILY MEDICINE

## 2020-12-17 PROCEDURE — 0001A COVID-19, MRNA, LNP-S, PF, 30 MCG/0.3 ML DOSE VACCINE: CPT | Mod: CV19,S$GLB,, | Performed by: FAMILY MEDICINE

## 2020-12-17 PROCEDURE — 36415 COLL VENOUS BLD VENIPUNCTURE: CPT

## 2020-12-17 PROCEDURE — 87536 HIV-1 QUANT&REVRSE TRNSCRPJ: CPT

## 2020-12-20 LAB
FINAL PATHOLOGIC DIAGNOSIS: NORMAL
Lab: NORMAL

## 2020-12-21 ENCOUNTER — PATIENT MESSAGE (OUTPATIENT)
Dept: OBSTETRICS AND GYNECOLOGY | Facility: CLINIC | Age: 30
End: 2020-12-21

## 2020-12-21 ENCOUNTER — SPECIALTY PHARMACY (OUTPATIENT)
Dept: PHARMACY | Facility: CLINIC | Age: 30
End: 2020-12-21

## 2020-12-21 LAB
HIV UQ DATE RECEIVED: NORMAL
HIV UQ DATE REPORTED: NORMAL
HIV1 RNA # SERPL NAA+PROBE: <40 COPIES/ML
HIV1 RNA SERPL NAA+PROBE-LOG#: <1.6 LOG (10) COPIES/ML
HIV1 RNA SERPL QL NAA+PROBE: NOT DETECTED

## 2020-12-21 NOTE — TELEPHONE ENCOUNTER
Specialty Pharmacy - Refill Coordination    Specialty Medication Orders Linked to Encounter      Most Recent Value   Medication #1  abacavir-dolutegravir-lamivud (TRIUMEQ) 600- mg Tab (Order#[id number], Rx#[id number])          Refill Questions - Documented Responses      Most Recent Value   Relationship to patient of person spoken to?  Self   HIPAA/medical authority confirmed?  Yes   Any changes in contact preferences or allowed representatives?  No   Has the patient had any insurance changes?  No   Has the patient had any changes to specialty medication, dose, or instructions?  No   Has the patient started taking any new medications, herbals, or supplements?  No   Has the patient been diagnosed with any new medical conditions?  No   Does the patient have any new allergies to medications or foods?  No   Does the patient have any concerns about side effects?  No   Can the patient store medication/sharps container properly (at the correct temperature, away from children/pets, etc.)?  Yes   Can the patient call emergency services (911) in the event of an emergency?  Yes   Does the patient have any concerns or questions about taking or administering this medication as prescribed?  No   How many doses did the patient miss in the past 4 weeks or since the last fill?  0   How many doses does the patient have on hand?  5   How many days does the patient report on hand quantity will last?  5   Does the number of doses/days supply remaining match pharmacy expected amounts?  Yes   Does the patient feel that this medication is effective?  Yes   During the past 4 weeks, has patient missed any activities due to condition or medication?  No   During the past 4 weeks, did patient have any of the following urgent care visits?  None   How will the patient receive the medication?  Mail   When does the patient need to receive the medication?  12/22/20   Shipping Address  Home   Address in [facility] confirmed and updated  if neccessary?  Yes   Expected Copay ($)  0   Is the patient able to afford the medication copay?  Yes   Payment Method  zero copay   Days supply of Refill  30   Would patient like to speak to a pharmacist?  No   Do you want to trigger an intervention?  No   Do you want to trigger an additional referral task?  No   Refill activity completed?  Yes   Refill activity plan  Refill scheduled   Shipment/Pickup Date:  12/21/20          Current Outpatient Medications   Medication Sig    abacavir-dolutegravir-lamivud (TRIUMEQ) 600- mg Tab Take 1 tablet by mouth once daily.    busPIRone (BUSPAR) 5 MG Tab Take 1 tablet (5 mg total) by mouth 2 (two) times daily.    naproxen (NAPROSYN) 375 MG tablet Take 1 tablet (375 mg total) by mouth 2 (two) times daily with meals. (Patient taking differently: Take 375 mg by mouth after meals as needed. )    promethazine (PHENERGAN) 12.5 MG Tab Take 1 tablet (12.5 mg total) by mouth 4 (four) times daily. (Patient taking differently: Take 12.5 mg by mouth every 4 (four) hours as needed. )    sertraline (ZOLOFT) 100 MG tablet Take 1.5 tablets (150 mg total) by mouth once daily.    sumatriptan (IMITREX) 100 MG tablet Take 1 tablet (100 mg total) by mouth every 2 (two) hours as needed for Migraine (Max 2 tab as needed in 24 hrs).   Last reviewed on 12/9/2020  8:16 AM by Michelle Valadez LPN    Review of patient's allergies indicates:   Allergen Reactions    Eggs [egg derived] Nausea Only    Ondansetron Dermatitis    Sulfa (sulfonamide antibiotics) Nausea Only    Sulfamethoxazole-trimethoprim Diarrhea    Last reviewed on  12/21/2020 7:53 AM by Edith Brandt      Tasks added this encounter   1/13/2021 - Refill Call (Auto Added)   Tasks due within next 3 months   No tasks due.     Edith Brandt  [facility]  [address] [place] [id number]  Phone: [phone]  Fax: [phone]

## 2021-01-07 ENCOUNTER — IMMUNIZATION (OUTPATIENT)
Dept: PRIMARY CARE CLINIC | Facility: CLINIC | Age: 31
End: 2021-01-07
Payer: COMMERCIAL

## 2021-01-07 DIAGNOSIS — Z23 NEED FOR VACCINATION: ICD-10-CM

## 2021-01-07 PROCEDURE — 0002A COVID-19, MRNA, LNP-S, PF, 30 MCG/0.3 ML DOSE VACCINE: ICD-10-PCS | Mod: S$GLB,,, | Performed by: FAMILY MEDICINE

## 2021-01-07 PROCEDURE — 91300 COVID-19, MRNA, LNP-S, PF, 30 MCG/0.3 ML DOSE VACCINE: ICD-10-PCS | Mod: S$GLB,,, | Performed by: FAMILY MEDICINE

## 2021-01-07 PROCEDURE — 91300 COVID-19, MRNA, LNP-S, PF, 30 MCG/0.3 ML DOSE VACCINE: CPT | Mod: S$GLB,,, | Performed by: FAMILY MEDICINE

## 2021-01-07 PROCEDURE — 0002A COVID-19, MRNA, LNP-S, PF, 30 MCG/0.3 ML DOSE VACCINE: CPT | Mod: S$GLB,,, | Performed by: FAMILY MEDICINE

## 2021-01-08 ENCOUNTER — TELEPHONE (OUTPATIENT)
Dept: FAMILY MEDICINE | Facility: CLINIC | Age: 31
End: 2021-01-08

## 2021-01-13 ENCOUNTER — SPECIALTY PHARMACY (OUTPATIENT)
Dept: PHARMACY | Facility: CLINIC | Age: 31
End: 2021-01-13

## 2021-01-22 DIAGNOSIS — J01.90 ACUTE SINUSITIS WITH COEXISTING CONDITION, NEED PROPHYLACTIC TREATMENT: Primary | ICD-10-CM

## 2021-01-22 RX ORDER — AZITHROMYCIN 250 MG/1
TABLET, FILM COATED ORAL
Qty: 6 TABLET | Refills: 0 | Status: SHIPPED | OUTPATIENT
Start: 2021-01-22 | End: 2021-01-27

## 2021-01-22 RX ORDER — BENZONATATE 200 MG/1
200 CAPSULE ORAL 3 TIMES DAILY PRN
Qty: 20 CAPSULE | Refills: 1 | Status: SHIPPED | OUTPATIENT
Start: 2021-01-22 | End: 2021-02-01

## 2021-01-22 RX ORDER — METHYLPREDNISOLONE 4 MG/1
TABLET ORAL
Qty: 1 PACKAGE | Refills: 0 | Status: SHIPPED | OUTPATIENT
Start: 2021-01-22 | End: 2021-02-12

## 2021-02-11 ENCOUNTER — PATIENT MESSAGE (OUTPATIENT)
Dept: INFECTIOUS DISEASES | Facility: CLINIC | Age: 31
End: 2021-02-11

## 2021-02-12 ENCOUNTER — CLINICAL SUPPORT (OUTPATIENT)
Dept: FAMILY MEDICINE | Facility: CLINIC | Age: 31
End: 2021-02-12
Payer: COMMERCIAL

## 2021-02-12 PROCEDURE — 99499 UNLISTED E&M SERVICE: CPT | Mod: S$GLB,,, | Performed by: PHYSICIAN ASSISTANT

## 2021-02-12 PROCEDURE — 99499 NO LOS: ICD-10-PCS | Mod: S$GLB,,, | Performed by: PHYSICIAN ASSISTANT

## 2021-02-15 ENCOUNTER — SPECIALTY PHARMACY (OUTPATIENT)
Dept: PHARMACY | Facility: CLINIC | Age: 31
End: 2021-02-15

## 2021-02-26 ENCOUNTER — PATIENT MESSAGE (OUTPATIENT)
Dept: INFECTIOUS DISEASES | Facility: CLINIC | Age: 31
End: 2021-02-26

## 2021-03-08 ENCOUNTER — OFFICE VISIT (OUTPATIENT)
Dept: INFECTIOUS DISEASES | Facility: CLINIC | Age: 31
End: 2021-03-08
Payer: COMMERCIAL

## 2021-03-08 VITALS
SYSTOLIC BLOOD PRESSURE: 121 MMHG | HEART RATE: 67 BPM | OXYGEN SATURATION: 98 % | HEIGHT: 66 IN | BODY MASS INDEX: 29.41 KG/M2 | WEIGHT: 183 LBS | DIASTOLIC BLOOD PRESSURE: 82 MMHG | TEMPERATURE: 97 F

## 2021-03-08 DIAGNOSIS — Z79.899 ENCOUNTER FOR LONG-TERM (CURRENT) USE OF MEDICATIONS: ICD-10-CM

## 2021-03-08 DIAGNOSIS — B20 HIV INFECTION, UNSPECIFIED SYMPTOM STATUS: Primary | ICD-10-CM

## 2021-03-08 PROCEDURE — 99214 OFFICE O/P EST MOD 30 MIN: CPT | Mod: 25,S$GLB,, | Performed by: INTERNAL MEDICINE

## 2021-03-08 PROCEDURE — 90471 HEPATITIS A VACCINE ADULT IM: ICD-10-PCS | Mod: S$GLB,,, | Performed by: INTERNAL MEDICINE

## 2021-03-08 PROCEDURE — 99214 PR OFFICE/OUTPT VISIT, EST, LEVL IV, 30-39 MIN: ICD-10-PCS | Mod: 25,S$GLB,, | Performed by: INTERNAL MEDICINE

## 2021-03-08 PROCEDURE — 90632 HEPA VACCINE ADULT IM: CPT | Mod: S$GLB,,, | Performed by: INTERNAL MEDICINE

## 2021-03-08 PROCEDURE — 90632 HEPATITIS A VACCINE ADULT IM: ICD-10-PCS | Mod: S$GLB,,, | Performed by: INTERNAL MEDICINE

## 2021-03-08 PROCEDURE — 90471 IMMUNIZATION ADMIN: CPT | Mod: S$GLB,,, | Performed by: INTERNAL MEDICINE

## 2021-03-18 ENCOUNTER — OFFICE VISIT (OUTPATIENT)
Dept: OPTOMETRY | Facility: CLINIC | Age: 31
End: 2021-03-18
Payer: COMMERCIAL

## 2021-03-18 DIAGNOSIS — Z01.00 EXAMINATION OF EYES AND VISION: Primary | ICD-10-CM

## 2021-03-18 DIAGNOSIS — H52.7 REFRACTIVE ERROR: ICD-10-CM

## 2021-03-18 PROCEDURE — 99999 PR PBB SHADOW E&M-EST. PATIENT-LVL III: CPT | Mod: PBBFAC,,, | Performed by: OPTOMETRIST

## 2021-03-18 PROCEDURE — 92004 PR EYE EXAM, NEW PATIENT,COMPREHESV: ICD-10-PCS | Mod: S$GLB,,, | Performed by: OPTOMETRIST

## 2021-03-18 PROCEDURE — 92015 PR REFRACTION: ICD-10-PCS | Mod: S$GLB,,, | Performed by: OPTOMETRIST

## 2021-03-18 PROCEDURE — 99999 PR PBB SHADOW E&M-EST. PATIENT-LVL III: ICD-10-PCS | Mod: PBBFAC,,, | Performed by: OPTOMETRIST

## 2021-03-18 PROCEDURE — 92004 COMPRE OPH EXAM NEW PT 1/>: CPT | Mod: S$GLB,,, | Performed by: OPTOMETRIST

## 2021-03-18 PROCEDURE — 92015 DETERMINE REFRACTIVE STATE: CPT | Mod: S$GLB,,, | Performed by: OPTOMETRIST

## 2021-03-18 RX ORDER — PROMETHAZINE HYDROCHLORIDE 12.5 MG/1
12.5 TABLET ORAL EVERY 4 HOURS PRN
COMMUNITY

## 2021-04-08 ENCOUNTER — SPECIALTY PHARMACY (OUTPATIENT)
Dept: PHARMACY | Facility: CLINIC | Age: 31
End: 2021-04-08

## 2021-04-08 DIAGNOSIS — B20 HIV INFECTION, UNSPECIFIED SYMPTOM STATUS: ICD-10-CM

## 2021-04-08 RX ORDER — ABACAVIR SULFATE, DOLUTEGRAVIR SODIUM, LAMIVUDINE 600; 50; 300 MG/1; MG/1; MG/1
1 TABLET, FILM COATED ORAL DAILY
Qty: 90 TABLET | Refills: 1 | Status: SHIPPED | OUTPATIENT
Start: 2021-04-08 | End: 2021-10-12 | Stop reason: SDUPTHER

## 2021-04-15 ENCOUNTER — HOSPITAL ENCOUNTER (OUTPATIENT)
Dept: RADIOLOGY | Facility: CLINIC | Age: 31
Discharge: HOME | End: 2021-04-15
Attending: FAMILY MEDICINE
Payer: COMMERCIAL

## 2021-04-15 DIAGNOSIS — S69.92XA INJURY OF FINGER OF LEFT HAND, INITIAL ENCOUNTER: Primary | ICD-10-CM

## 2021-04-15 DIAGNOSIS — S69.92XA INJURY OF FINGER OF LEFT HAND, INITIAL ENCOUNTER: ICD-10-CM

## 2021-04-15 PROCEDURE — 73140 X-RAY EXAM OF FINGER(S): CPT | Mod: TC,FY,PO

## 2021-04-15 PROCEDURE — 73140 XR FINGER 2 OR MORE VIEWS: ICD-10-PCS | Mod: 26,LT,, | Performed by: RADIOLOGY

## 2021-04-15 PROCEDURE — 73140 X-RAY EXAM OF FINGER(S): CPT | Mod: 26,LT,, | Performed by: RADIOLOGY

## 2021-04-19 ENCOUNTER — OFFICE VISIT (OUTPATIENT)
Dept: OBSTETRICS AND GYNECOLOGY | Facility: CLINIC | Age: 31
End: 2021-04-19
Payer: COMMERCIAL

## 2021-04-19 VITALS
DIASTOLIC BLOOD PRESSURE: 86 MMHG | WEIGHT: 183.19 LBS | BODY MASS INDEX: 29.44 KG/M2 | SYSTOLIC BLOOD PRESSURE: 126 MMHG | HEIGHT: 66 IN

## 2021-04-19 DIAGNOSIS — R87.619 ABNORMAL CERVICAL PAPANICOLAOU SMEAR, UNSPECIFIED ABNORMAL PAP FINDING: Primary | ICD-10-CM

## 2021-04-19 DIAGNOSIS — Z30.432 ENCOUNTER FOR IUD REMOVAL: ICD-10-CM

## 2021-04-19 DIAGNOSIS — G43.829 MENSTRUAL MIGRAINE WITHOUT STATUS MIGRAINOSUS, NOT INTRACTABLE: ICD-10-CM

## 2021-04-19 PROCEDURE — 88175 CYTOPATH C/V AUTO FLUID REDO: CPT | Performed by: OBSTETRICS & GYNECOLOGY

## 2021-04-19 PROCEDURE — 99213 PR OFFICE/OUTPT VISIT, EST, LEVL III, 20-29 MIN: ICD-10-PCS | Mod: 25,S$GLB,, | Performed by: OBSTETRICS & GYNECOLOGY

## 2021-04-19 PROCEDURE — 88300 PR  SURG PATH,GROSS,LEVEL I: ICD-10-PCS | Mod: 26,,, | Performed by: PATHOLOGY

## 2021-04-19 PROCEDURE — 58301 PR REMOVE, INTRAUTERINE DEVICE: ICD-10-PCS | Mod: S$GLB,,, | Performed by: OBSTETRICS & GYNECOLOGY

## 2021-04-19 PROCEDURE — 87625 HPV TYPES 16 & 18 ONLY: CPT | Performed by: OBSTETRICS & GYNECOLOGY

## 2021-04-19 PROCEDURE — 58301 REMOVE INTRAUTERINE DEVICE: CPT | Mod: S$GLB,,, | Performed by: OBSTETRICS & GYNECOLOGY

## 2021-04-19 PROCEDURE — 3008F BODY MASS INDEX DOCD: CPT | Mod: CPTII,S$GLB,, | Performed by: OBSTETRICS & GYNECOLOGY

## 2021-04-19 PROCEDURE — 88300 SURGICAL PATH GROSS: CPT | Performed by: PATHOLOGY

## 2021-04-19 PROCEDURE — 3008F PR BODY MASS INDEX (BMI) DOCUMENTED: ICD-10-PCS | Mod: CPTII,S$GLB,, | Performed by: OBSTETRICS & GYNECOLOGY

## 2021-04-19 PROCEDURE — 99999 PR PBB SHADOW E&M-EST. PATIENT-LVL III: CPT | Mod: PBBFAC,,, | Performed by: OBSTETRICS & GYNECOLOGY

## 2021-04-19 PROCEDURE — 1126F AMNT PAIN NOTED NONE PRSNT: CPT | Mod: S$GLB,,, | Performed by: OBSTETRICS & GYNECOLOGY

## 2021-04-19 PROCEDURE — 87624 HPV HI-RISK TYP POOLED RSLT: CPT | Performed by: OBSTETRICS & GYNECOLOGY

## 2021-04-19 PROCEDURE — 99213 OFFICE O/P EST LOW 20 MIN: CPT | Mod: 25,S$GLB,, | Performed by: OBSTETRICS & GYNECOLOGY

## 2021-04-19 PROCEDURE — 99999 PR PBB SHADOW E&M-EST. PATIENT-LVL III: ICD-10-PCS | Mod: PBBFAC,,, | Performed by: OBSTETRICS & GYNECOLOGY

## 2021-04-19 PROCEDURE — 88300 SURGICAL PATH GROSS: CPT | Mod: 26,,, | Performed by: PATHOLOGY

## 2021-04-19 PROCEDURE — 1126F PR PAIN SEVERITY QUANTIFIED, NO PAIN PRESENT: ICD-10-PCS | Mod: S$GLB,,, | Performed by: OBSTETRICS & GYNECOLOGY

## 2021-04-21 ENCOUNTER — TELEPHONE (OUTPATIENT)
Dept: FAMILY MEDICINE | Facility: CLINIC | Age: 31
End: 2021-04-21

## 2021-04-21 ENCOUNTER — CLINICAL SUPPORT (OUTPATIENT)
Dept: FAMILY MEDICINE | Facility: CLINIC | Age: 31
End: 2021-04-21
Payer: COMMERCIAL

## 2021-04-21 DIAGNOSIS — J02.9 SORETHROAT: ICD-10-CM

## 2021-04-21 DIAGNOSIS — J02.9 SORE THROAT: Primary | ICD-10-CM

## 2021-04-21 PROCEDURE — 96372 THER/PROPH/DIAG INJ SC/IM: CPT | Mod: S$GLB,,, | Performed by: PHYSICIAN ASSISTANT

## 2021-04-21 PROCEDURE — 99999 PR PBB SHADOW E&M-EST. PATIENT-LVL I: CPT | Mod: PBBFAC,,,

## 2021-04-21 PROCEDURE — 96372 PR INJECTION,THERAP/PROPH/DIAG2ST, IM OR SUBCUT: ICD-10-PCS | Mod: S$GLB,,, | Performed by: PHYSICIAN ASSISTANT

## 2021-04-21 PROCEDURE — 99999 PR PBB SHADOW E&M-EST. PATIENT-LVL I: ICD-10-PCS | Mod: PBBFAC,,,

## 2021-04-21 RX ORDER — DEXAMETHASONE SODIUM PHOSPHATE 4 MG/ML
8 INJECTION, SOLUTION INTRA-ARTICULAR; INTRALESIONAL; INTRAMUSCULAR; INTRAVENOUS; SOFT TISSUE
Status: COMPLETED | OUTPATIENT
Start: 2021-04-21 | End: 2021-04-21

## 2021-04-21 RX ADMIN — DEXAMETHASONE SODIUM PHOSPHATE 8 MG: 4 INJECTION, SOLUTION INTRA-ARTICULAR; INTRALESIONAL; INTRAMUSCULAR; INTRAVENOUS; SOFT TISSUE at 03:04

## 2021-05-01 LAB
CLINICAL INFO: ABNORMAL
CYTO CVX: ABNORMAL
CYTOLOGIST CVX/VAG CYTO: ABNORMAL
CYTOLOGY CMNT CVX/VAG CYTO-IMP: ABNORMAL
CYTOLOGY PAP THIN PREP EXPLANATION: ABNORMAL
DATE OF PREVIOUS PAP: ABNORMAL
DATE PREVIOUS BX: NO
GEN CATEG CVX/VAG CYTO-IMP: ABNORMAL
LMP START DATE: ABNORMAL
PATHOLOGIST CVX/VAG CYTO: ABNORMAL
SPECIMEN SOURCE CVX/VAG CYTO: ABNORMAL
STAT OF ADQ CVX/VAG CYTO-IMP: ABNORMAL

## 2021-05-03 LAB
FINAL PATHOLOGIC DIAGNOSIS: NORMAL
GROSS: NORMAL
HPV I/H RISK 4 DNA CVX QL NAA+PROBE: DETECTED
HPV16 DNA CVX QL PROBE+SIG AMP: NOT DETECTED
HPV18 DNA CVX QL PROBE+SIG AMP: NOT DETECTED
Lab: NORMAL

## 2021-05-14 ENCOUNTER — SPECIALTY PHARMACY (OUTPATIENT)
Dept: PHARMACY | Facility: CLINIC | Age: 31
End: 2021-05-14

## 2021-05-24 DIAGNOSIS — L01.00 IMPETIGO: Primary | ICD-10-CM

## 2021-05-24 RX ORDER — CEPHALEXIN 500 MG/1
500 CAPSULE ORAL EVERY 6 HOURS
Qty: 40 CAPSULE | Refills: 0 | Status: SHIPPED | OUTPATIENT
Start: 2021-05-24 | End: 2021-07-09

## 2021-06-15 ENCOUNTER — LAB VISIT (OUTPATIENT)
Dept: LAB | Facility: HOSPITAL | Age: 31
End: 2021-06-15
Attending: INTERNAL MEDICINE
Payer: COMMERCIAL

## 2021-06-15 ENCOUNTER — SPECIALTY PHARMACY (OUTPATIENT)
Dept: PHARMACY | Facility: CLINIC | Age: 31
End: 2021-06-15

## 2021-06-15 DIAGNOSIS — B20 HIV INFECTION, UNSPECIFIED SYMPTOM STATUS: ICD-10-CM

## 2021-06-15 LAB
ALBUMIN SERPL BCP-MCNC: 3.9 G/DL (ref 3.5–5.2)
ALP SERPL-CCNC: 61 U/L (ref 55–135)
ALT SERPL W/O P-5'-P-CCNC: 15 U/L (ref 10–44)
ANION GAP SERPL CALC-SCNC: 7 MMOL/L (ref 8–16)
AST SERPL-CCNC: 14 U/L (ref 10–40)
BASOPHILS # BLD AUTO: 0.04 K/UL (ref 0–0.2)
BASOPHILS NFR BLD: 0.8 % (ref 0–1.9)
BILIRUB SERPL-MCNC: 0.5 MG/DL (ref 0.1–1)
BUN SERPL-MCNC: 10 MG/DL (ref 6–20)
CALCIUM SERPL-MCNC: 8.9 MG/DL (ref 8.7–10.5)
CD3+CD4+ CELLS # BLD: 956 CELLS/UL (ref 300–1400)
CD3+CD4+ CELLS NFR BLD: 45.6 % (ref 28–57)
CHLORIDE SERPL-SCNC: 108 MMOL/L (ref 95–110)
CHOLEST SERPL-MCNC: 204 MG/DL (ref 120–199)
CHOLEST/HDLC SERPL: 3.8 {RATIO} (ref 2–5)
CO2 SERPL-SCNC: 23 MMOL/L (ref 23–29)
CREAT SERPL-MCNC: 0.9 MG/DL (ref 0.5–1.4)
DIFFERENTIAL METHOD: ABNORMAL
EOSINOPHIL # BLD AUTO: 0.2 K/UL (ref 0–0.5)
EOSINOPHIL NFR BLD: 4.2 % (ref 0–8)
ERYTHROCYTE [DISTWIDTH] IN BLOOD BY AUTOMATED COUNT: 12.3 % (ref 11.5–14.5)
EST. GFR  (AFRICAN AMERICAN): >60 ML/MIN/1.73 M^2
EST. GFR  (NON AFRICAN AMERICAN): >60 ML/MIN/1.73 M^2
GLUCOSE SERPL-MCNC: 86 MG/DL (ref 70–110)
HCT VFR BLD AUTO: 38.8 % (ref 37–48.5)
HDLC SERPL-MCNC: 53 MG/DL (ref 40–75)
HDLC SERPL: 26 % (ref 20–50)
HGB BLD-MCNC: 13.1 G/DL (ref 12–16)
IMM GRANULOCYTES # BLD AUTO: 0.01 K/UL (ref 0–0.04)
IMM GRANULOCYTES NFR BLD AUTO: 0.2 % (ref 0–0.5)
LDLC SERPL CALC-MCNC: 132 MG/DL (ref 63–159)
LYMPHOCYTES # BLD AUTO: 2.3 K/UL (ref 1–4.8)
LYMPHOCYTES NFR BLD: 44.3 % (ref 18–48)
MCH RBC QN AUTO: 31.9 PG (ref 27–31)
MCHC RBC AUTO-ENTMCNC: 33.8 G/DL (ref 32–36)
MCV RBC AUTO: 94 FL (ref 82–98)
MONOCYTES # BLD AUTO: 0.4 K/UL (ref 0.3–1)
MONOCYTES NFR BLD: 6.7 % (ref 4–15)
NEUTROPHILS # BLD AUTO: 2.3 K/UL (ref 1.8–7.7)
NEUTROPHILS NFR BLD: 43.8 % (ref 38–73)
NONHDLC SERPL-MCNC: 151 MG/DL
NRBC BLD-RTO: 0 /100 WBC
PLATELET # BLD AUTO: 178 K/UL (ref 150–450)
PMV BLD AUTO: 11.4 FL (ref 9.2–12.9)
POTASSIUM SERPL-SCNC: 4.1 MMOL/L (ref 3.5–5.1)
PROT SERPL-MCNC: 7 G/DL (ref 6–8.4)
RBC # BLD AUTO: 4.11 M/UL (ref 4–5.4)
SODIUM SERPL-SCNC: 138 MMOL/L (ref 136–145)
TRIGL SERPL-MCNC: 95 MG/DL (ref 30–150)
WBC # BLD AUTO: 5.24 K/UL (ref 3.9–12.7)

## 2021-06-15 PROCEDURE — 36415 COLL VENOUS BLD VENIPUNCTURE: CPT | Performed by: INTERNAL MEDICINE

## 2021-06-15 PROCEDURE — 85025 COMPLETE CBC W/AUTO DIFF WBC: CPT | Performed by: INTERNAL MEDICINE

## 2021-06-15 PROCEDURE — 86592 SYPHILIS TEST NON-TREP QUAL: CPT | Performed by: INTERNAL MEDICINE

## 2021-06-15 PROCEDURE — 80061 LIPID PANEL: CPT | Performed by: INTERNAL MEDICINE

## 2021-06-15 PROCEDURE — 86361 T CELL ABSOLUTE COUNT: CPT | Performed by: INTERNAL MEDICINE

## 2021-06-15 PROCEDURE — 87536 HIV-1 QUANT&REVRSE TRNSCRPJ: CPT | Performed by: INTERNAL MEDICINE

## 2021-06-15 PROCEDURE — 80053 COMPREHEN METABOLIC PANEL: CPT | Performed by: INTERNAL MEDICINE

## 2021-06-16 LAB
HIV1 RNA # PLAS NAA DL=20: NORMAL COPIES/ML
RPR SER QL: NORMAL

## 2021-07-12 ENCOUNTER — PATIENT MESSAGE (OUTPATIENT)
Dept: INFECTIOUS DISEASES | Facility: CLINIC | Age: 31
End: 2021-07-12

## 2021-07-12 ENCOUNTER — OFFICE VISIT (OUTPATIENT)
Dept: INFECTIOUS DISEASES | Facility: CLINIC | Age: 31
End: 2021-07-12
Payer: COMMERCIAL

## 2021-07-12 VITALS
HEART RATE: 80 BPM | HEIGHT: 66 IN | WEIGHT: 178 LBS | OXYGEN SATURATION: 99 % | TEMPERATURE: 97 F | BODY MASS INDEX: 28.61 KG/M2 | SYSTOLIC BLOOD PRESSURE: 130 MMHG | DIASTOLIC BLOOD PRESSURE: 91 MMHG

## 2021-07-12 DIAGNOSIS — B20 HIV INFECTION, UNSPECIFIED SYMPTOM STATUS: Primary | ICD-10-CM

## 2021-07-12 DIAGNOSIS — Z79.899 ENCOUNTER FOR LONG-TERM (CURRENT) USE OF MEDICATIONS: ICD-10-CM

## 2021-07-12 PROCEDURE — 99214 OFFICE O/P EST MOD 30 MIN: CPT | Mod: S$GLB,,, | Performed by: INTERNAL MEDICINE

## 2021-07-12 PROCEDURE — 1126F AMNT PAIN NOTED NONE PRSNT: CPT | Mod: S$GLB,,, | Performed by: INTERNAL MEDICINE

## 2021-07-12 PROCEDURE — 99214 PR OFFICE/OUTPT VISIT, EST, LEVL IV, 30-39 MIN: ICD-10-PCS | Mod: S$GLB,,, | Performed by: INTERNAL MEDICINE

## 2021-07-12 PROCEDURE — 1126F PR PAIN SEVERITY QUANTIFIED, NO PAIN PRESENT: ICD-10-PCS | Mod: S$GLB,,, | Performed by: INTERNAL MEDICINE

## 2021-07-12 RX ORDER — PHENTERMINE HYDROCHLORIDE 37.5 MG/1
37.5 TABLET ORAL EVERY MORNING
COMMUNITY
Start: 2021-07-01 | End: 2021-10-18

## 2021-07-14 ENCOUNTER — SPECIALTY PHARMACY (OUTPATIENT)
Dept: PHARMACY | Facility: CLINIC | Age: 31
End: 2021-07-14

## 2021-08-09 RX ORDER — VALACYCLOVIR HYDROCHLORIDE 1 G/1
1000 TABLET, FILM COATED ORAL EVERY 8 HOURS
Qty: 21 TABLET | Refills: 0 | Status: SHIPPED | OUTPATIENT
Start: 2021-08-09 | End: 2021-10-18

## 2021-08-11 ENCOUNTER — SPECIALTY PHARMACY (OUTPATIENT)
Dept: PHARMACY | Facility: CLINIC | Age: 31
End: 2021-08-11

## 2021-08-17 ENCOUNTER — OFFICE VISIT (OUTPATIENT)
Dept: OBSTETRICS AND GYNECOLOGY | Facility: CLINIC | Age: 31
End: 2021-08-17
Payer: COMMERCIAL

## 2021-08-17 VITALS
HEIGHT: 66 IN | BODY MASS INDEX: 28.34 KG/M2 | SYSTOLIC BLOOD PRESSURE: 114 MMHG | DIASTOLIC BLOOD PRESSURE: 72 MMHG | WEIGHT: 176.38 LBS | RESPIRATION RATE: 18 BRPM

## 2021-08-17 DIAGNOSIS — R87.612 LOW GRADE SQUAMOUS INTRAEPITHELIAL LESION ON CYTOLOGIC SMEAR OF CERVIX (LGSIL): Primary | ICD-10-CM

## 2021-08-17 DIAGNOSIS — R87.610 ATYPICAL SQUAMOUS CELLS OF UNDETERMINED SIGNIFICANCE (ASCUS) ON PAPANICOLAOU SMEAR OF CERVIX: ICD-10-CM

## 2021-08-17 PROCEDURE — 3008F PR BODY MASS INDEX (BMI) DOCUMENTED: ICD-10-PCS | Mod: CPTII,S$GLB,, | Performed by: OBSTETRICS & GYNECOLOGY

## 2021-08-17 PROCEDURE — 3074F PR MOST RECENT SYSTOLIC BLOOD PRESSURE < 130 MM HG: ICD-10-PCS | Mod: CPTII,S$GLB,, | Performed by: OBSTETRICS & GYNECOLOGY

## 2021-08-17 PROCEDURE — 1125F PR PAIN SEVERITY QUANTIFIED, PAIN PRESENT: ICD-10-PCS | Mod: CPTII,S$GLB,, | Performed by: OBSTETRICS & GYNECOLOGY

## 2021-08-17 PROCEDURE — 1159F MED LIST DOCD IN RCRD: CPT | Mod: CPTII,S$GLB,, | Performed by: OBSTETRICS & GYNECOLOGY

## 2021-08-17 PROCEDURE — 1125F AMNT PAIN NOTED PAIN PRSNT: CPT | Mod: CPTII,S$GLB,, | Performed by: OBSTETRICS & GYNECOLOGY

## 2021-08-17 PROCEDURE — 99213 PR OFFICE/OUTPT VISIT, EST, LEVL III, 20-29 MIN: ICD-10-PCS | Mod: S$GLB,,, | Performed by: OBSTETRICS & GYNECOLOGY

## 2021-08-17 PROCEDURE — 3078F PR MOST RECENT DIASTOLIC BLOOD PRESSURE < 80 MM HG: ICD-10-PCS | Mod: CPTII,S$GLB,, | Performed by: OBSTETRICS & GYNECOLOGY

## 2021-08-17 PROCEDURE — 99999 PR PBB SHADOW E&M-EST. PATIENT-LVL III: CPT | Mod: PBBFAC,,, | Performed by: OBSTETRICS & GYNECOLOGY

## 2021-08-17 PROCEDURE — 1159F PR MEDICATION LIST DOCUMENTED IN MEDICAL RECORD: ICD-10-PCS | Mod: CPTII,S$GLB,, | Performed by: OBSTETRICS & GYNECOLOGY

## 2021-08-17 PROCEDURE — 99999 PR PBB SHADOW E&M-EST. PATIENT-LVL III: ICD-10-PCS | Mod: PBBFAC,,, | Performed by: OBSTETRICS & GYNECOLOGY

## 2021-08-17 PROCEDURE — 88141 PR  CYTOPATH CERV/VAG INTERPRET: ICD-10-PCS | Mod: ,,, | Performed by: PATHOLOGY

## 2021-08-17 PROCEDURE — 87624 HPV HI-RISK TYP POOLED RSLT: CPT | Performed by: OBSTETRICS & GYNECOLOGY

## 2021-08-17 PROCEDURE — 99213 OFFICE O/P EST LOW 20 MIN: CPT | Mod: S$GLB,,, | Performed by: OBSTETRICS & GYNECOLOGY

## 2021-08-17 PROCEDURE — 3074F SYST BP LT 130 MM HG: CPT | Mod: CPTII,S$GLB,, | Performed by: OBSTETRICS & GYNECOLOGY

## 2021-08-17 PROCEDURE — 3008F BODY MASS INDEX DOCD: CPT | Mod: CPTII,S$GLB,, | Performed by: OBSTETRICS & GYNECOLOGY

## 2021-08-17 PROCEDURE — 88175 CYTOPATH C/V AUTO FLUID REDO: CPT | Performed by: PATHOLOGY

## 2021-08-17 PROCEDURE — 88141 CYTOPATH C/V INTERPRET: CPT | Mod: ,,, | Performed by: PATHOLOGY

## 2021-08-17 PROCEDURE — 3078F DIAST BP <80 MM HG: CPT | Mod: CPTII,S$GLB,, | Performed by: OBSTETRICS & GYNECOLOGY

## 2021-08-25 LAB
FINAL PATHOLOGIC DIAGNOSIS: ABNORMAL
Lab: ABNORMAL

## 2021-09-01 LAB
HPV HR 12 DNA SPEC QL NAA+PROBE: NEGATIVE
HPV16 AG SPEC QL: NEGATIVE
HPV18 DNA SPEC QL NAA+PROBE: NEGATIVE

## 2021-09-02 ENCOUNTER — OFFICE VISIT (OUTPATIENT)
Dept: FAMILY MEDICINE | Facility: CLINIC | Age: 31
End: 2021-09-02
Payer: COMMERCIAL

## 2021-09-02 DIAGNOSIS — N30.00 ACUTE CYSTITIS WITHOUT HEMATURIA: Primary | ICD-10-CM

## 2021-09-02 PROCEDURE — 99213 PR OFFICE/OUTPT VISIT, EST, LEVL III, 20-29 MIN: ICD-10-PCS | Mod: 95,,, | Performed by: PHYSICIAN ASSISTANT

## 2021-09-02 PROCEDURE — 87086 URINE CULTURE/COLONY COUNT: CPT | Performed by: PHYSICIAN ASSISTANT

## 2021-09-02 PROCEDURE — 81003 URINALYSIS AUTO W/O SCOPE: CPT | Performed by: PHYSICIAN ASSISTANT

## 2021-09-02 PROCEDURE — 1160F RVW MEDS BY RX/DR IN RCRD: CPT | Mod: CPTII,,, | Performed by: PHYSICIAN ASSISTANT

## 2021-09-02 PROCEDURE — 1159F PR MEDICATION LIST DOCUMENTED IN MEDICAL RECORD: ICD-10-PCS | Mod: CPTII,,, | Performed by: PHYSICIAN ASSISTANT

## 2021-09-02 PROCEDURE — 87088 URINE BACTERIA CULTURE: CPT | Performed by: PHYSICIAN ASSISTANT

## 2021-09-02 PROCEDURE — 1160F PR REVIEW ALL MEDS BY PRESCRIBER/CLIN PHARMACIST DOCUMENTED: ICD-10-PCS | Mod: CPTII,,, | Performed by: PHYSICIAN ASSISTANT

## 2021-09-02 PROCEDURE — 1159F MED LIST DOCD IN RCRD: CPT | Mod: CPTII,,, | Performed by: PHYSICIAN ASSISTANT

## 2021-09-02 PROCEDURE — 99213 OFFICE O/P EST LOW 20 MIN: CPT | Mod: 95,,, | Performed by: PHYSICIAN ASSISTANT

## 2021-09-02 RX ORDER — NITROFURANTOIN 25; 75 MG/1; MG/1
100 CAPSULE ORAL 2 TIMES DAILY
Qty: 14 CAPSULE | Refills: 0 | Status: SHIPPED | OUTPATIENT
Start: 2021-09-02 | End: 2021-10-18

## 2021-09-04 DIAGNOSIS — R82.90 ABNORMAL URINE: Primary | ICD-10-CM

## 2021-09-04 LAB — BACTERIA UR CULT: ABNORMAL

## 2021-09-04 RX ORDER — SULFAMETHOXAZOLE AND TRIMETHOPRIM 800; 160 MG/1; MG/1
1 TABLET ORAL 2 TIMES DAILY
Qty: 14 TABLET | Refills: 0 | Status: SHIPPED | OUTPATIENT
Start: 2021-09-04 | End: 2021-09-11

## 2021-09-08 ENCOUNTER — TELEPHONE (OUTPATIENT)
Dept: FAMILY MEDICINE | Facility: CLINIC | Age: 31
End: 2021-09-08

## 2021-09-08 ENCOUNTER — CLINICAL SUPPORT (OUTPATIENT)
Dept: FAMILY MEDICINE | Facility: CLINIC | Age: 31
End: 2021-09-08
Payer: COMMERCIAL

## 2021-09-08 DIAGNOSIS — R19.7 DIARRHEA, UNSPECIFIED TYPE: Primary | ICD-10-CM

## 2021-09-08 DIAGNOSIS — R51.9 ACUTE NONINTRACTABLE HEADACHE, UNSPECIFIED HEADACHE TYPE: ICD-10-CM

## 2021-09-08 LAB
CTP QC/QA: YES
SARS-COV-2 RDRP RESP QL NAA+PROBE: NEGATIVE

## 2021-09-08 PROCEDURE — 87635 SARS-COV-2 COVID-19 AMP PRB: CPT | Mod: QW,,, | Performed by: FAMILY MEDICINE

## 2021-09-08 PROCEDURE — 99499 NO LOS: ICD-10-PCS | Mod: S$GLB,,, | Performed by: FAMILY MEDICINE

## 2021-09-08 PROCEDURE — 87635: ICD-10-PCS | Mod: QW,,, | Performed by: FAMILY MEDICINE

## 2021-09-08 PROCEDURE — 99499 UNLISTED E&M SERVICE: CPT | Mod: S$GLB,,, | Performed by: FAMILY MEDICINE

## 2021-09-08 NOTE — TELEPHONE ENCOUNTER
Patient is having covid like symptoms and is needing a rapid covid swab to be put in .   Please advise. Order Pended

## 2021-09-10 ENCOUNTER — PATIENT MESSAGE (OUTPATIENT)
Dept: INFECTIOUS DISEASES | Facility: CLINIC | Age: 31
End: 2021-09-10

## 2021-09-15 ENCOUNTER — SPECIALTY PHARMACY (OUTPATIENT)
Dept: PHARMACY | Facility: CLINIC | Age: 31
End: 2021-09-15

## 2021-09-16 ENCOUNTER — CLINICAL SUPPORT (OUTPATIENT)
Dept: FAMILY MEDICINE | Facility: CLINIC | Age: 31
End: 2021-09-16
Payer: COMMERCIAL

## 2021-09-16 DIAGNOSIS — R82.90 ABNORMAL URINE: Primary | ICD-10-CM

## 2021-09-16 LAB
BILIRUB UR QL STRIP: NEGATIVE
CLARITY UR REFRACT.AUTO: CLEAR
COLOR UR AUTO: YELLOW
GLUCOSE UR QL STRIP: NEGATIVE
HGB UR QL STRIP: NEGATIVE
KETONES UR QL STRIP: NEGATIVE
LEUKOCYTE ESTERASE UR QL STRIP: NEGATIVE
NITRITE UR QL STRIP: NEGATIVE
PH UR STRIP: 7 [PH] (ref 5–8)
PROT UR QL STRIP: NEGATIVE
SP GR UR STRIP: 1.02 (ref 1–1.03)
URN SPEC COLLECT METH UR: NORMAL

## 2021-09-16 PROCEDURE — 99499 UNLISTED E&M SERVICE: CPT | Mod: S$GLB,,, | Performed by: PHYSICIAN ASSISTANT

## 2021-09-16 PROCEDURE — 99499 NO LOS: ICD-10-PCS | Mod: S$GLB,,, | Performed by: PHYSICIAN ASSISTANT

## 2021-10-12 ENCOUNTER — SPECIALTY PHARMACY (OUTPATIENT)
Dept: PHARMACY | Facility: CLINIC | Age: 31
End: 2021-10-12

## 2021-10-12 DIAGNOSIS — B20 HIV INFECTION, UNSPECIFIED SYMPTOM STATUS: ICD-10-CM

## 2021-10-12 RX ORDER — ABACAVIR SULFATE, DOLUTEGRAVIR SODIUM, LAMIVUDINE 600; 50; 300 MG/1; MG/1; MG/1
1 TABLET, FILM COATED ORAL DAILY
Qty: 90 TABLET | Refills: 1 | Status: SHIPPED | OUTPATIENT
Start: 2021-10-12 | End: 2022-04-08 | Stop reason: SDUPTHER

## 2021-10-13 ENCOUNTER — SPECIALTY PHARMACY (OUTPATIENT)
Dept: PHARMACY | Facility: CLINIC | Age: 31
End: 2021-10-13

## 2021-10-18 ENCOUNTER — OFFICE VISIT (OUTPATIENT)
Dept: FAMILY MEDICINE | Facility: CLINIC | Age: 31
End: 2021-10-18
Payer: COMMERCIAL

## 2021-10-18 ENCOUNTER — LAB VISIT (OUTPATIENT)
Dept: FAMILY MEDICINE | Facility: CLINIC | Age: 31
End: 2021-10-18
Payer: COMMERCIAL

## 2021-10-18 ENCOUNTER — PATIENT MESSAGE (OUTPATIENT)
Dept: INFECTIOUS DISEASES | Facility: CLINIC | Age: 31
End: 2021-10-18
Payer: COMMERCIAL

## 2021-10-18 DIAGNOSIS — R11.2 NON-INTRACTABLE VOMITING WITH NAUSEA, UNSPECIFIED VOMITING TYPE: ICD-10-CM

## 2021-10-18 DIAGNOSIS — J02.9 PHARYNGITIS: Primary | ICD-10-CM

## 2021-10-18 DIAGNOSIS — J02.9 PHARYNGITIS, UNSPECIFIED ETIOLOGY: Primary | ICD-10-CM

## 2021-10-18 DIAGNOSIS — R11.2 NAUSEA & VOMITING: ICD-10-CM

## 2021-10-18 DIAGNOSIS — R50.9 FEVER, UNSPECIFIED FEVER CAUSE: ICD-10-CM

## 2021-10-18 PROBLEM — G43.909 MIGRAINE: Status: ACTIVE | Noted: 2019-07-18

## 2021-10-18 PROBLEM — R87.611 PAP SMEAR OF CERVIX WITH ASCUS, CANNOT EXCLUDE HGSIL: Status: ACTIVE | Noted: 2019-08-19

## 2021-10-18 PROBLEM — K58.9 IRRITABLE BOWEL SYNDROME: Status: ACTIVE | Noted: 2019-07-18

## 2021-10-18 LAB
BASOPHILS # BLD AUTO: 0.04 K/UL (ref 0–0.2)
BASOPHILS NFR BLD: 0.4 % (ref 0–1.9)
CTP QC/QA: YES
DIFFERENTIAL METHOD: ABNORMAL
EOSINOPHIL # BLD AUTO: 0.2 K/UL (ref 0–0.5)
EOSINOPHIL NFR BLD: 1.5 % (ref 0–8)
ERYTHROCYTE [DISTWIDTH] IN BLOOD BY AUTOMATED COUNT: 12 % (ref 11.5–14.5)
HCT VFR BLD AUTO: 37.7 % (ref 37–48.5)
HGB BLD-MCNC: 12.7 G/DL (ref 12–16)
IMM GRANULOCYTES # BLD AUTO: 0.02 K/UL (ref 0–0.04)
IMM GRANULOCYTES NFR BLD AUTO: 0.2 % (ref 0–0.5)
LYMPHOCYTES # BLD AUTO: 1.9 K/UL (ref 1–4.8)
LYMPHOCYTES NFR BLD: 19.3 % (ref 18–48)
MCH RBC QN AUTO: 32.3 PG (ref 27–31)
MCHC RBC AUTO-ENTMCNC: 33.7 G/DL (ref 32–36)
MCV RBC AUTO: 96 FL (ref 82–98)
MONOCYTES # BLD AUTO: 0.5 K/UL (ref 0.3–1)
MONOCYTES NFR BLD: 5.4 % (ref 4–15)
NEUTROPHILS # BLD AUTO: 7.1 K/UL (ref 1.8–7.7)
NEUTROPHILS NFR BLD: 73.2 % (ref 38–73)
NRBC BLD-RTO: 0 /100 WBC
PLATELET # BLD AUTO: 168 K/UL (ref 150–450)
PMV BLD AUTO: 11.3 FL (ref 9.2–12.9)
RBC # BLD AUTO: 3.93 M/UL (ref 4–5.4)
SARS-COV-2 RDRP RESP QL NAA+PROBE: NEGATIVE
WBC # BLD AUTO: 9.75 K/UL (ref 3.9–12.7)

## 2021-10-18 PROCEDURE — 1159F PR MEDICATION LIST DOCUMENTED IN MEDICAL RECORD: ICD-10-PCS | Mod: 95,,, | Performed by: STUDENT IN AN ORGANIZED HEALTH CARE EDUCATION/TRAINING PROGRAM

## 2021-10-18 PROCEDURE — 1160F PR REVIEW ALL MEDS BY PRESCRIBER/CLIN PHARMACIST DOCUMENTED: ICD-10-PCS | Mod: 95,,, | Performed by: STUDENT IN AN ORGANIZED HEALTH CARE EDUCATION/TRAINING PROGRAM

## 2021-10-18 PROCEDURE — 99213 OFFICE O/P EST LOW 20 MIN: CPT | Mod: 95,,, | Performed by: STUDENT IN AN ORGANIZED HEALTH CARE EDUCATION/TRAINING PROGRAM

## 2021-10-18 PROCEDURE — 1159F MED LIST DOCD IN RCRD: CPT | Mod: 95,,, | Performed by: STUDENT IN AN ORGANIZED HEALTH CARE EDUCATION/TRAINING PROGRAM

## 2021-10-18 PROCEDURE — 99213 PR OFFICE/OUTPT VISIT, EST, LEVL III, 20-29 MIN: ICD-10-PCS | Mod: 95,,, | Performed by: STUDENT IN AN ORGANIZED HEALTH CARE EDUCATION/TRAINING PROGRAM

## 2021-10-18 PROCEDURE — 85025 COMPLETE CBC W/AUTO DIFF WBC: CPT | Performed by: INTERNAL MEDICINE

## 2021-10-18 PROCEDURE — 1160F RVW MEDS BY RX/DR IN RCRD: CPT | Mod: 95,,, | Performed by: STUDENT IN AN ORGANIZED HEALTH CARE EDUCATION/TRAINING PROGRAM

## 2021-11-03 ENCOUNTER — PATIENT MESSAGE (OUTPATIENT)
Dept: GASTROENTEROLOGY | Facility: CLINIC | Age: 31
End: 2021-11-03
Payer: COMMERCIAL

## 2021-11-04 ENCOUNTER — PATIENT MESSAGE (OUTPATIENT)
Dept: GASTROENTEROLOGY | Facility: CLINIC | Age: 31
End: 2021-11-04
Payer: COMMERCIAL

## 2021-11-08 DIAGNOSIS — R05.9 COUGH: Primary | ICD-10-CM

## 2021-11-08 RX ORDER — HYDROCODONE BITARTRATE AND ACETAMINOPHEN 5; 325 MG/1; MG/1
TABLET ORAL
Qty: 15 TABLET | Refills: 0 | Status: SHIPPED | OUTPATIENT
Start: 2021-11-08 | End: 2021-11-16 | Stop reason: ALTCHOICE

## 2021-11-09 RX ORDER — CEFUROXIME AXETIL 500 MG/1
500 TABLET ORAL 2 TIMES DAILY
Qty: 10 TABLET | Refills: 0 | Status: SHIPPED | OUTPATIENT
Start: 2021-11-09 | End: 2021-11-14

## 2021-11-10 ENCOUNTER — TELEPHONE (OUTPATIENT)
Dept: FAMILY MEDICINE | Facility: CLINIC | Age: 31
End: 2021-11-10
Payer: COMMERCIAL

## 2021-11-10 ENCOUNTER — PATIENT MESSAGE (OUTPATIENT)
Dept: GASTROENTEROLOGY | Facility: CLINIC | Age: 31
End: 2021-11-10
Payer: COMMERCIAL

## 2021-11-10 ENCOUNTER — HOSPITAL ENCOUNTER (OUTPATIENT)
Dept: RADIOLOGY | Facility: CLINIC | Age: 31
Discharge: HOME | End: 2021-11-10
Attending: INTERNAL MEDICINE
Payer: COMMERCIAL

## 2021-11-10 DIAGNOSIS — R06.2 WHEEZING: ICD-10-CM

## 2021-11-10 DIAGNOSIS — R05.9 COUGH: ICD-10-CM

## 2021-11-10 DIAGNOSIS — J40 BRONCHITIS: ICD-10-CM

## 2021-11-10 DIAGNOSIS — J40 BRONCHITIS: Primary | ICD-10-CM

## 2021-11-10 PROCEDURE — 71046 XR CHEST PA AND LATERAL: ICD-10-PCS | Mod: S$GLB,,, | Performed by: RADIOLOGY

## 2021-11-10 PROCEDURE — 71046 X-RAY EXAM CHEST 2 VIEWS: CPT | Mod: S$GLB,,, | Performed by: RADIOLOGY

## 2021-11-15 ENCOUNTER — SPECIALTY PHARMACY (OUTPATIENT)
Dept: PHARMACY | Facility: CLINIC | Age: 31
End: 2021-11-15
Payer: COMMERCIAL

## 2021-11-15 DIAGNOSIS — F41.9 ANXIETY AND DEPRESSION: Primary | ICD-10-CM

## 2021-11-15 DIAGNOSIS — F32.A ANXIETY AND DEPRESSION: Primary | ICD-10-CM

## 2021-11-15 RX ORDER — CLORAZEPATE DIPOTASSIUM 7.5 MG/1
TABLET ORAL
Qty: 30 TABLET | Refills: 0 | Status: SHIPPED | OUTPATIENT
Start: 2021-11-15 | End: 2022-10-12 | Stop reason: SDUPTHER

## 2021-11-16 ENCOUNTER — OFFICE VISIT (OUTPATIENT)
Dept: GASTROENTEROLOGY | Facility: CLINIC | Age: 31
End: 2021-11-16
Payer: COMMERCIAL

## 2021-11-16 VITALS
BODY MASS INDEX: 28.84 KG/M2 | DIASTOLIC BLOOD PRESSURE: 83 MMHG | HEART RATE: 64 BPM | HEIGHT: 66 IN | WEIGHT: 179.44 LBS | SYSTOLIC BLOOD PRESSURE: 135 MMHG

## 2021-11-16 DIAGNOSIS — K59.09 CHRONIC CONSTIPATION WITH OVERFLOW: Primary | ICD-10-CM

## 2021-11-16 DIAGNOSIS — K58.1 IRRITABLE BOWEL SYNDROME WITH CONSTIPATION: ICD-10-CM

## 2021-11-16 PROCEDURE — 99203 OFFICE O/P NEW LOW 30 MIN: CPT | Mod: S$GLB,,, | Performed by: INTERNAL MEDICINE

## 2021-11-16 PROCEDURE — 99999 PR PBB SHADOW E&M-EST. PATIENT-LVL III: CPT | Mod: PBBFAC,,, | Performed by: INTERNAL MEDICINE

## 2021-11-16 PROCEDURE — 3079F PR MOST RECENT DIASTOLIC BLOOD PRESSURE 80-89 MM HG: ICD-10-PCS | Mod: CPTII,S$GLB,, | Performed by: INTERNAL MEDICINE

## 2021-11-16 PROCEDURE — 1159F MED LIST DOCD IN RCRD: CPT | Mod: CPTII,S$GLB,, | Performed by: INTERNAL MEDICINE

## 2021-11-16 PROCEDURE — 99203 PR OFFICE/OUTPT VISIT, NEW, LEVL III, 30-44 MIN: ICD-10-PCS | Mod: S$GLB,,, | Performed by: INTERNAL MEDICINE

## 2021-11-16 PROCEDURE — 1160F PR REVIEW ALL MEDS BY PRESCRIBER/CLIN PHARMACIST DOCUMENTED: ICD-10-PCS | Mod: CPTII,S$GLB,, | Performed by: INTERNAL MEDICINE

## 2021-11-16 PROCEDURE — 3008F PR BODY MASS INDEX (BMI) DOCUMENTED: ICD-10-PCS | Mod: CPTII,S$GLB,, | Performed by: INTERNAL MEDICINE

## 2021-11-16 PROCEDURE — 1160F RVW MEDS BY RX/DR IN RCRD: CPT | Mod: CPTII,S$GLB,, | Performed by: INTERNAL MEDICINE

## 2021-11-16 PROCEDURE — 3075F PR MOST RECENT SYSTOLIC BLOOD PRESS GE 130-139MM HG: ICD-10-PCS | Mod: CPTII,S$GLB,, | Performed by: INTERNAL MEDICINE

## 2021-11-16 PROCEDURE — 1159F PR MEDICATION LIST DOCUMENTED IN MEDICAL RECORD: ICD-10-PCS | Mod: CPTII,S$GLB,, | Performed by: INTERNAL MEDICINE

## 2021-11-16 PROCEDURE — 3008F BODY MASS INDEX DOCD: CPT | Mod: CPTII,S$GLB,, | Performed by: INTERNAL MEDICINE

## 2021-11-16 PROCEDURE — 3075F SYST BP GE 130 - 139MM HG: CPT | Mod: CPTII,S$GLB,, | Performed by: INTERNAL MEDICINE

## 2021-11-16 PROCEDURE — 99999 PR PBB SHADOW E&M-EST. PATIENT-LVL III: ICD-10-PCS | Mod: PBBFAC,,, | Performed by: INTERNAL MEDICINE

## 2021-11-16 PROCEDURE — 3079F DIAST BP 80-89 MM HG: CPT | Mod: CPTII,S$GLB,, | Performed by: INTERNAL MEDICINE

## 2021-12-06 ENCOUNTER — PATIENT MESSAGE (OUTPATIENT)
Dept: GASTROENTEROLOGY | Facility: CLINIC | Age: 31
End: 2021-12-06
Payer: COMMERCIAL

## 2021-12-13 ENCOUNTER — SPECIALTY PHARMACY (OUTPATIENT)
Dept: PHARMACY | Facility: CLINIC | Age: 31
End: 2021-12-13
Payer: COMMERCIAL

## 2021-12-14 ENCOUNTER — LAB VISIT (OUTPATIENT)
Dept: LAB | Facility: HOSPITAL | Age: 31
End: 2021-12-14
Attending: INTERNAL MEDICINE
Payer: COMMERCIAL

## 2021-12-14 ENCOUNTER — OFFICE VISIT (OUTPATIENT)
Dept: OBSTETRICS AND GYNECOLOGY | Facility: CLINIC | Age: 31
End: 2021-12-14
Payer: COMMERCIAL

## 2021-12-14 ENCOUNTER — PATIENT MESSAGE (OUTPATIENT)
Dept: INFECTIOUS DISEASES | Facility: CLINIC | Age: 31
End: 2021-12-14
Payer: COMMERCIAL

## 2021-12-14 VITALS
SYSTOLIC BLOOD PRESSURE: 103 MMHG | WEIGHT: 182.56 LBS | BODY MASS INDEX: 29.46 KG/M2 | DIASTOLIC BLOOD PRESSURE: 70 MMHG

## 2021-12-14 DIAGNOSIS — Z79.899 ENCOUNTER FOR LONG-TERM (CURRENT) USE OF MEDICATIONS: ICD-10-CM

## 2021-12-14 DIAGNOSIS — Z87.42 HX OF ABNORMAL CERVICAL PAP SMEAR: Primary | ICD-10-CM

## 2021-12-14 LAB
ALBUMIN SERPL BCP-MCNC: 4.3 G/DL (ref 3.5–5.2)
ALP SERPL-CCNC: 49 U/L (ref 55–135)
ALT SERPL W/O P-5'-P-CCNC: 13 U/L (ref 10–44)
ANION GAP SERPL CALC-SCNC: 6 MMOL/L (ref 8–16)
AST SERPL-CCNC: 17 U/L (ref 10–40)
BASOPHILS # BLD AUTO: 0.05 K/UL (ref 0–0.2)
BASOPHILS NFR BLD: 0.8 % (ref 0–1.9)
BILIRUB SERPL-MCNC: 1.1 MG/DL (ref 0.1–1)
BILIRUB UR QL STRIP: NEGATIVE
BUN SERPL-MCNC: 11 MG/DL (ref 6–20)
CALCIUM SERPL-MCNC: 9.1 MG/DL (ref 8.7–10.5)
CHLORIDE SERPL-SCNC: 104 MMOL/L (ref 95–110)
CHOLEST SERPL-MCNC: 218 MG/DL (ref 120–199)
CHOLEST/HDLC SERPL: 3 {RATIO} (ref 2–5)
CLARITY UR: CLEAR
CO2 SERPL-SCNC: 27 MMOL/L (ref 23–29)
COLOR UR: YELLOW
CREAT SERPL-MCNC: 0.9 MG/DL (ref 0.5–1.4)
DIFFERENTIAL METHOD: ABNORMAL
EOSINOPHIL # BLD AUTO: 0.2 K/UL (ref 0–0.5)
EOSINOPHIL NFR BLD: 3.4 % (ref 0–8)
ERYTHROCYTE [DISTWIDTH] IN BLOOD BY AUTOMATED COUNT: 12.4 % (ref 11.5–14.5)
EST. GFR  (AFRICAN AMERICAN): >60 ML/MIN/1.73 M^2
EST. GFR  (NON AFRICAN AMERICAN): >60 ML/MIN/1.73 M^2
GLUCOSE SERPL-MCNC: 96 MG/DL (ref 70–110)
GLUCOSE UR QL STRIP: NEGATIVE
HCT VFR BLD AUTO: 40.8 % (ref 37–48.5)
HDLC SERPL-MCNC: 72 MG/DL (ref 40–75)
HDLC SERPL: 33 % (ref 20–50)
HGB BLD-MCNC: 13.7 G/DL (ref 12–16)
HGB UR QL STRIP: NEGATIVE
IMM GRANULOCYTES # BLD AUTO: 0.02 K/UL (ref 0–0.04)
IMM GRANULOCYTES NFR BLD AUTO: 0.3 % (ref 0–0.5)
KETONES UR QL STRIP: NEGATIVE
LDLC SERPL CALC-MCNC: 127.6 MG/DL (ref 63–159)
LEUKOCYTE ESTERASE UR QL STRIP: NEGATIVE
LYMPHOCYTES # BLD AUTO: 2.4 K/UL (ref 1–4.8)
LYMPHOCYTES NFR BLD: 38.6 % (ref 18–48)
MCH RBC QN AUTO: 31.4 PG (ref 27–31)
MCHC RBC AUTO-ENTMCNC: 33.6 G/DL (ref 32–36)
MCV RBC AUTO: 94 FL (ref 82–98)
MONOCYTES # BLD AUTO: 0.4 K/UL (ref 0.3–1)
MONOCYTES NFR BLD: 5.8 % (ref 4–15)
NEUTROPHILS # BLD AUTO: 3.1 K/UL (ref 1.8–7.7)
NEUTROPHILS NFR BLD: 51.1 % (ref 38–73)
NITRITE UR QL STRIP: NEGATIVE
NONHDLC SERPL-MCNC: 146 MG/DL
NRBC BLD-RTO: 0 /100 WBC
PH UR STRIP: 8 [PH] (ref 5–8)
PLATELET # BLD AUTO: 178 K/UL (ref 150–450)
PMV BLD AUTO: 10.6 FL (ref 9.2–12.9)
POTASSIUM SERPL-SCNC: 4.7 MMOL/L (ref 3.5–5.1)
PROT SERPL-MCNC: 7.3 G/DL (ref 6–8.4)
PROT UR QL STRIP: NEGATIVE
RBC # BLD AUTO: 4.36 M/UL (ref 4–5.4)
SODIUM SERPL-SCNC: 137 MMOL/L (ref 136–145)
SP GR UR STRIP: 1.02 (ref 1–1.03)
TRIGL SERPL-MCNC: 92 MG/DL (ref 30–150)
URN SPEC COLLECT METH UR: NORMAL
UROBILINOGEN UR STRIP-ACNC: NEGATIVE EU/DL
WBC # BLD AUTO: 6.16 K/UL (ref 3.9–12.7)

## 2021-12-14 PROCEDURE — 99999 PR PBB SHADOW E&M-EST. PATIENT-LVL III: ICD-10-PCS | Mod: PBBFAC,,, | Performed by: OBSTETRICS & GYNECOLOGY

## 2021-12-14 PROCEDURE — 87536 HIV-1 QUANT&REVRSE TRNSCRPJ: CPT | Performed by: INTERNAL MEDICINE

## 2021-12-14 PROCEDURE — 36415 COLL VENOUS BLD VENIPUNCTURE: CPT | Performed by: INTERNAL MEDICINE

## 2021-12-14 PROCEDURE — 85025 COMPLETE CBC W/AUTO DIFF WBC: CPT | Performed by: INTERNAL MEDICINE

## 2021-12-14 PROCEDURE — 99999 PR PBB SHADOW E&M-EST. PATIENT-LVL III: CPT | Mod: PBBFAC,,, | Performed by: OBSTETRICS & GYNECOLOGY

## 2021-12-14 PROCEDURE — 88175 CYTOPATH C/V AUTO FLUID REDO: CPT | Performed by: OBSTETRICS & GYNECOLOGY

## 2021-12-14 PROCEDURE — 87624 HPV HI-RISK TYP POOLED RSLT: CPT | Performed by: OBSTETRICS & GYNECOLOGY

## 2021-12-14 PROCEDURE — 80053 COMPREHEN METABOLIC PANEL: CPT | Performed by: INTERNAL MEDICINE

## 2021-12-14 PROCEDURE — 99213 OFFICE O/P EST LOW 20 MIN: CPT | Mod: S$GLB,,, | Performed by: OBSTETRICS & GYNECOLOGY

## 2021-12-14 PROCEDURE — 81003 URINALYSIS AUTO W/O SCOPE: CPT | Performed by: INTERNAL MEDICINE

## 2021-12-14 PROCEDURE — 80061 LIPID PANEL: CPT | Performed by: INTERNAL MEDICINE

## 2021-12-14 PROCEDURE — 99213 PR OFFICE/OUTPT VISIT, EST, LEVL III, 20-29 MIN: ICD-10-PCS | Mod: S$GLB,,, | Performed by: OBSTETRICS & GYNECOLOGY

## 2021-12-16 LAB
HIV1 RNA # SERPL NAA+PROBE: <20 COPIES/ML
HIV1 RNA SERPL NAA+PROBE-LOG#: NORMAL LOG10COPY/ML

## 2021-12-23 LAB
CLINICAL INFO: ABNORMAL
CYTO CVX: ABNORMAL
CYTOLOGIST CVX/VAG CYTO: ABNORMAL
CYTOLOGIST CVX/VAG CYTO: ABNORMAL
CYTOLOGY CMNT CVX/VAG CYTO-IMP: ABNORMAL
CYTOLOGY PAP THIN PREP EXPLANATION: ABNORMAL
DATE OF PREVIOUS PAP: ABNORMAL
DATE PREVIOUS BX: NO
GEN CATEG CVX/VAG CYTO-IMP: ABNORMAL
HPV I/H RISK 4 DNA CVX QL NAA+PROBE: NOT DETECTED
LMP START DATE: ABNORMAL
MICROORGANISM CVX/VAG CYTO: ABNORMAL
PATHOLOGIST CVX/VAG CYTO: ABNORMAL
SERVICE CMNT-IMP: ABNORMAL
SPECIMEN SOURCE CVX/VAG CYTO: ABNORMAL
STAT OF ADQ CVX/VAG CYTO-IMP: ABNORMAL

## 2021-12-28 ENCOUNTER — IMMUNIZATION (OUTPATIENT)
Dept: PRIMARY CARE CLINIC | Facility: CLINIC | Age: 31
End: 2021-12-28
Payer: COMMERCIAL

## 2021-12-28 DIAGNOSIS — Z23 NEED FOR VACCINATION: Primary | ICD-10-CM

## 2021-12-28 PROCEDURE — 0004A COVID-19, MRNA, LNP-S, PF, 30 MCG/0.3 ML DOSE VACCINE: CPT | Mod: S$GLB,,, | Performed by: FAMILY MEDICINE

## 2021-12-28 PROCEDURE — 91300 COVID-19, MRNA, LNP-S, PF, 30 MCG/0.3 ML DOSE VACCINE: CPT | Mod: S$GLB,,, | Performed by: FAMILY MEDICINE

## 2021-12-28 PROCEDURE — 0004A COVID-19, MRNA, LNP-S, PF, 30 MCG/0.3 ML DOSE VACCINE: ICD-10-PCS | Mod: S$GLB,,, | Performed by: FAMILY MEDICINE

## 2021-12-28 PROCEDURE — 91300 COVID-19, MRNA, LNP-S, PF, 30 MCG/0.3 ML DOSE VACCINE: ICD-10-PCS | Mod: S$GLB,,, | Performed by: FAMILY MEDICINE

## 2022-01-06 ENCOUNTER — PATIENT MESSAGE (OUTPATIENT)
Dept: OBSTETRICS AND GYNECOLOGY | Facility: CLINIC | Age: 32
End: 2022-01-06
Payer: COMMERCIAL

## 2022-01-07 DIAGNOSIS — Z20.822 CLOSE EXPOSURE TO COVID-19 VIRUS: ICD-10-CM

## 2022-01-07 DIAGNOSIS — R52 BODY ACHES: Primary | ICD-10-CM

## 2022-01-08 ENCOUNTER — PATIENT MESSAGE (OUTPATIENT)
Dept: FAMILY MEDICINE | Facility: CLINIC | Age: 32
End: 2022-01-08
Payer: COMMERCIAL

## 2022-01-08 ENCOUNTER — PATIENT MESSAGE (OUTPATIENT)
Dept: OPTOMETRY | Facility: CLINIC | Age: 32
End: 2022-01-08
Payer: COMMERCIAL

## 2022-01-10 ENCOUNTER — PATIENT MESSAGE (OUTPATIENT)
Dept: OPHTHALMOLOGY | Facility: CLINIC | Age: 32
End: 2022-01-10
Payer: COMMERCIAL

## 2022-01-12 ENCOUNTER — SPECIALTY PHARMACY (OUTPATIENT)
Dept: PHARMACY | Facility: CLINIC | Age: 32
End: 2022-01-12
Payer: COMMERCIAL

## 2022-01-12 NOTE — TELEPHONE ENCOUNTER
Specialty Pharmacy - Refill Coordination    Specialty Medication Orders Linked to Encounter    Flowsheet Row Most Recent Value   Medication #1 abacavir-dolutegravir-lamivud (TRIUMEQ) 600- mg Tab (Order#[id number], Rx#[id number])          Refill Questions - Documented Responses    Flowsheet Row Most Recent Value   Patient Availability and HIPAA Verification    Does patient want to proceed with activity? Yes   HIPAA/medical authority confirmed? Yes   Relationship to patient of person spoken to? Self   Refill Screening Questions    Changes to allergies? No   Changes to medications? No   New conditions since last clinic visit? No   Unplanned office visit, urgent care, ED, or hospital admission in the last 4 weeks? No   How does patient/caregiver feel medication is working? Good   Financial problems or insurance changes? No   How many doses of your specialty medications were missed in the last 4 weeks? 0   Would patient like to speak to a pharmacist? No   When does the patient need to receive the medication? 01/19/22   Refill Delivery Questions    How will the patient receive the medication? Mail   When does the patient need to receive the medication? 01/19/22   Shipping Address Home   Address in [facility] confirmed and updated if neccessary? Yes   Expected Copay ($) 0   Is the patient able to afford the medication copay? Yes   Payment Method zero copay   Days supply of Refill 30   Supplies needed? No supplies needed   Refill activity completed? Yes   Refill activity plan Refill scheduled   Shipment/Pickup Date: 01/18/22          Current Outpatient Medications   Medication Sig    abacavir-dolutegravir-lamivud (TRIUMEQ) 600- mg Tab Take 1 tablet by mouth once daily.    clorazepate (TRANXENE) 7.5 MG Tab Take half to 1 tablet by mouth 3 times a day as needed    linaCLOtide (LINZESS) 72 mcg Cap capsule Take 1 capsule (72 mcg total) by mouth before breakfast.    promethazine (PHENERGAN) 12.5 MG Tab  Take 12.5 mg by mouth every 4 (four) hours as needed.    sertraline (ZOLOFT) 100 MG tablet TAKE 1.5 TABLETS (150 MG TOTAL) BY MOUTH ONCE DAILY.    sumatriptan (IMITREX) 100 MG tablet 1 tab oral may repeat every 2 hrs.. Max 2 tabs a day   Last reviewed on 12/14/2021  9:19 AM by Julisa Mejia MA    Review of patient's allergies indicates:   Allergen Reactions    Eggs [egg derived] Nausea Only    Ondansetron Dermatitis    Sulfa (sulfonamide antibiotics) Nausea Only    Sulfamethoxazole-trimethoprim Diarrhea    Last reviewed on  12/14/2021 9:19 AM by Julisa Mejia      Tasks added this encounter   2/11/2022 - Refill Call (Auto Added)   Tasks due within next 3 months   No tasks due.     [facility]  [address] [facility] [address]  Phone: [phone]  Fax: [phone]

## 2022-01-13 ENCOUNTER — OFFICE VISIT (OUTPATIENT)
Dept: INFECTIOUS DISEASES | Facility: CLINIC | Age: 32
End: 2022-01-13
Payer: COMMERCIAL

## 2022-01-13 VITALS
DIASTOLIC BLOOD PRESSURE: 66 MMHG | TEMPERATURE: 98 F | WEIGHT: 184.38 LBS | SYSTOLIC BLOOD PRESSURE: 118 MMHG | HEIGHT: 66 IN | OXYGEN SATURATION: 98 % | BODY MASS INDEX: 29.63 KG/M2 | HEART RATE: 71 BPM

## 2022-01-13 DIAGNOSIS — E55.9 VITAMIN D DEFICIENCY: ICD-10-CM

## 2022-01-13 DIAGNOSIS — Z79.899 ENCOUNTER FOR LONG-TERM (CURRENT) USE OF MEDICATIONS: Primary | ICD-10-CM

## 2022-01-13 DIAGNOSIS — Z23 ENCOUNTER FOR IMMUNIZATION: ICD-10-CM

## 2022-01-13 PROCEDURE — 90734 MENINGOCOCCAL CONJUGATE VACCINE 4-VALENT IM (MENACTRA): ICD-10-PCS | Mod: S$GLB,,, | Performed by: INTERNAL MEDICINE

## 2022-01-13 PROCEDURE — 99214 PR OFFICE/OUTPT VISIT, EST, LEVL IV, 30-39 MIN: ICD-10-PCS | Mod: 25,S$GLB,, | Performed by: INTERNAL MEDICINE

## 2022-01-13 PROCEDURE — 90734 MENACWYD/MENACWYCRM VACC IM: CPT | Mod: S$GLB,,, | Performed by: INTERNAL MEDICINE

## 2022-01-13 PROCEDURE — 90471 IMMUNIZATION ADMIN: CPT | Mod: S$GLB,,, | Performed by: INTERNAL MEDICINE

## 2022-01-13 PROCEDURE — 90471 MENINGOCOCCAL CONJUGATE VACCINE 4-VALENT IM (MENACTRA): ICD-10-PCS | Mod: S$GLB,,, | Performed by: INTERNAL MEDICINE

## 2022-01-13 PROCEDURE — 99214 OFFICE O/P EST MOD 30 MIN: CPT | Mod: 25,S$GLB,, | Performed by: INTERNAL MEDICINE

## 2022-01-13 PROCEDURE — 1160F RVW MEDS BY RX/DR IN RCRD: CPT | Mod: S$GLB,,, | Performed by: INTERNAL MEDICINE

## 2022-01-13 PROCEDURE — 1160F PR REVIEW ALL MEDS BY PRESCRIBER/CLIN PHARMACIST DOCUMENTED: ICD-10-PCS | Mod: S$GLB,,, | Performed by: INTERNAL MEDICINE

## 2022-01-13 RX ORDER — ERGOCALCIFEROL 1.25 MG/1
50000 CAPSULE ORAL
Qty: 12 CAPSULE | Refills: 3 | Status: SHIPPED | OUTPATIENT
Start: 2022-01-13 | End: 2023-01-13

## 2022-01-13 NOTE — PATIENT INSTRUCTIONS
Menactra today  Same medications plus vitamin D 17455 IU weekly.     D, zinc, C, quercetin    Follow up 6 months

## 2022-01-13 NOTE — PROGRESS NOTES
Subjective:       Patient ID: Veronique Vick is a 31 y.o. female.    Chief Complaint::  B20    Her Source of exposure learned that he was positive last November and was on treatment but did not reveal his diagnosis until after their last encounter in April 2020.   Her last test was probably during pregnancy for her 2 year old who exhibits no signs of infection .   Recent serologies were reviewed.   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 The Genosure prime was not completed and will be redrawn. We reviewed the [facility] guidelines for treatment in pregnant women and have come to a tentative agreement to use Triumeq. She learned that the source of her infection was on Biktarvy at the time.    12/7/20: tolerating triumeq, as long as she takes it with food. I have not received records from prior GYN, but can see a fair amount in care everywhere. She has 4 month pap repeated this week. She took her flu vaccine. Having anxiety, regarding her ex , requests something for anxiety, non habit forming. She agrees to Buspar.     3/8/21: tolerating triumeq, its a large tablet. Has a significant other, thinking about getting . Yevgeniy. Thinking about having children.  She is interested in him receiving prep.  She is taking herself in her partner.  She understands that she will see a maternal/fetal medicine. Her OB is Dr. Fields and she would deliver at [facility]    7/12/21: interim reviewed. Reviewed lab 6/2021 lab.  Doing extremely well.  Having a good weight loss, dieting, exercising    1/13/22: interim reviewed. PAP still abnormal but HPV negative. Reviewed dec lab.  Had zoster and  brief neuralgia left thoracic, under a very stressful circumstance. Resolved with valtrex.    Current Outpatient Medications:     abacavir-dolutegravir-lamivud (TRIUMEQ) 600- mg Tab, Take 1 tablet by mouth once daily., Disp: 90 tablet, Rfl: 1    clorazepate (TRANXENE) 7.5 MG Tab, Take half to 1 tablet by mouth 3 times a day as needed, Disp: 30 tablet, Rfl: 0    linaCLOtide (LINZESS) 72 mcg Cap capsule, Take 1 capsule (72 mcg total) by mouth before breakfast., Disp: 30 capsule, Rfl: 3    promethazine (PHENERGAN) 12.5 MG Tab, Take 12.5 mg by mouth every 4 (four) hours as needed., Disp: , Rfl:     sertraline (ZOLOFT) 100 MG tablet, TAKE 1.5 TABLETS (150 MG TOTAL) BY MOUTH ONCE DAILY., Disp: 135 tablet, Rfl: 1    sumatriptan (IMITREX) 100 MG tablet, 1 tab oral may repeat every 2 hrs.. Max 2 tabs a day, Disp: 9 tablet, Rfl: 3    ergocalciferol (VITAMIN D2) 50,000 unit Cap, Take 1 capsule (50,000 Units total) by mouth every 7 days., Disp: 12 capsule, Rfl: 3  Review of patient's allergies indicates:   Allergen Reactions    Eggs [egg derived] Nausea Only    Ondansetron Dermatitis    Sulfa (sulfonamide antibiotics) Nausea Only    Sulfamethoxazole-trimethoprim Diarrhea     Past Medical History:   Diagnosis Date    Wrist fracture     left   abnormal PAP    Past Surgical History:   Procedure Laterality Date    ORIF RADIUS & ULNA FRACTURES Left 2016    WISDOM TOOTH EXTRACTION       Social History     Socioeconomic History    Marital status: Single   Tobacco Use    Smoking status: Never Smoker    Smokeless tobacco: Never Used   Substance and Sexual Activity    Alcohol use: Yes     Alcohol/week: 0.0 standard drinks     Comment: social    Drug use: No    Sexual activity: Yes     Partners: Male     Birth control/protection: I.U.D.     Social Determinants of Health     Financial Resource Strain: Low Risk     Difficulty of Paying Living Expenses: Not hard at all   Food Insecurity: No Food Insecurity     Worried About Running Out of Food in the Last Year: Never true    Ran Out of Food in the Last Year: Never true   Transportation Needs: No Transportation Needs    Lack of Transportation (Medical): No    Lack of Transportation (Non-Medical): No   Physical Activity: Sufficiently Active    Days of Exercise per Week: 5 days    Minutes of Exercise per Session: 30 min   Stress: Stress Concern Present    Feeling of Stress : Very much   Social Connections: Unknown    Frequency of Communication with Friends and Family: More than three times a week    Frequency of Social Gatherings with Friends and Family: More than three times a week    Active Member of Clubs or Organizations: Yes    Attends Club or Organization Meetings: More than 4 times per year    Marital Status: Never    Housing Stability: Low Risk     Unable to Pay for Housing in the Last Year: No    Number of Places Lived in the Last Year: 1    Unstable Housing in the Last Year: No     Family History   Problem Relation Age of Onset    Hyperlipidemia Father     Hypertension Father        Travel History: [place], [place], [place]ise[place]  Vaccine History: Hep B series (6th grade), Tdap 2/2018, yearly flu vaccine(egg allergy), zostavax 2013, COVID x 3 2021  Advanced Directive:   Safer Sex:  abstinent  Gyn: (Dr. Fields) UTD, repeat PAP 9/2021  Mammogram:  Bone Density:   Colonoscopy:    Review of Systems      Constitutional: No fever, chills, sweats, fatigue, weakness, weight loss. Exercising periodically    Eyes: No change in vision, loss of vision,  . UTD eye exam    ENT: No sinus drainage, sore throat, mouth pain, or lesions. UTD dental exam    Cardiovascular: No chest pain, CANNON, palpitations or pedal edema    Respiratory: No shortness of breath, CANNON, cough,       Gastrointestinal: No abdominal pain, nausea, vomiting, diarrhea,   Regular with linzess    Genitourinary: No dysuria, hematuria, , or urethritis    Musculoskeletal: No new pain, joint  "swelling, or injuries    Integumentary: No new rashes, lesions, or wounds    Neurological: No dizziness, vertigo, unusual headaches, neuropathy, or falls    Psychiatric:  No anxiety, depression, no memory loss,   or substance abuse    Endocrine: not diabetic.  Thyroid normal    Lymphatic: No lymphadenopathy, blood loss, anemia, or malignancy    Objective:      Blood pressure 118/66, pulse 71, temperature 98.2 °F (36.8 °C), height 5' 6" (1.676 m), weight 83.6 kg (184 lb 6.4 oz), SpO2 98 %. Body mass index is 29.76 kg/m².  Physical Exam    126/96    General: Alert and attentive, cooperative and in no distress    Eyes: Pupils equal, round, reactive to light, anicteric, EOMI    Neck: Supple, non-tender, no thyromegaly or masses    ENT: EAC patent, TM normal, nares patent, no oral lesions, teeth in good condition, no thrush    Cardiovascular: Regular rate and rhythm, no murmurs, rubs, or gallop    Respiratory: Lungs clear without wheezes, no rales, rub or rhonci    Gastrointestinal:  Soft, nontender, no masses, no guarding, bowel sounds normal    Genitourinary:  No flank tenderness    Vascular: No peripheral edema, phlebitis, pulses normal. Warm and well perfused    Musculoskeletal: Ambulates without difficulty, no acute arthritis, synovitis or myositis. Normal muscle bulk and strength    Integumentary: Skin without rashes, lesions, or wounds    AnusRectum:      Neurological: Normal LOC, cranial nerves, speech, reflexes, normal gait    Psychiatric: Normal mood, speech, demeanor    Lymphatic: No cervical, supraclavicular, axillary  or inguinal lymphadenopathy      Wound:     HIV Table:   Toxo IgG  8/17/20 positive  [id number]  8/17/20 negtive  RPR   6/15/21 Non reactive  Hepatitis A IgG  8/12/20 negative  Hepatitis B sAg 8/12/20 negative  Hepatitis B sAb 8/12/20 positive  Hepatitis B cAb 8/12/20 negative  Hepatitis C Ab  8/12/20 negative  Chl/GC  Vitamin D  8/12/20 31, low  TB gold  8/17/20 negative  Genotype  9/23/20 "  genotypr plus and integrase all neg  CD4 initial  8/17/20 Over 1000  Initial RNA  8/17/20 1500      Recent Diagnostics: reviewed all of the lab results       Assessment and Plan:           Encounter for long-term (current) use of medications    Encounter for immunization    Vitamin D deficiency    Other orders  -     ergocalciferol (VITAMIN D2) 50,000 unit Cap; Take 1 capsule (50,000 Units total) by mouth every 7 days.  Dispense: 12 capsule; Refill: 3  -     Meningococcal Conjugate - MCV4P (MENACTRA)    Menactra today  Same medications plus vitamin D 49801 IU weekly.     D, zinc, C, quercetin    Follow up 6 months           This note was created using Dragon voice recognition software that occasionally misinterpreted phrases or words.

## 2022-01-16 ENCOUNTER — PATIENT MESSAGE (OUTPATIENT)
Dept: INFECTIOUS DISEASES | Facility: CLINIC | Age: 32
End: 2022-01-16
Payer: COMMERCIAL

## 2022-01-22 ENCOUNTER — PATIENT MESSAGE (OUTPATIENT)
Dept: PHARMACY | Facility: CLINIC | Age: 32
End: 2022-01-22
Payer: COMMERCIAL

## 2022-02-11 ENCOUNTER — SPECIALTY PHARMACY (OUTPATIENT)
Dept: PHARMACY | Facility: CLINIC | Age: 32
End: 2022-02-11
Payer: COMMERCIAL

## 2022-02-11 NOTE — TELEPHONE ENCOUNTER
Specialty Pharmacy - Refill Coordination    Specialty Medication Orders Linked to Encounter    Flowsheet Row Most Recent Value   Medication #1 abacavir-dolutegravir-lamivud (TRIUMEQ) 600- mg Tab (Order#[id number], Rx#[id number])          Refill Questions - Documented Responses    Flowsheet Row Most Recent Value   Patient Availability and HIPAA Verification    Does patient want to proceed with activity? Yes   HIPAA/medical authority confirmed? Yes   Relationship to patient of person spoken to? Self   Refill Screening Questions    Changes to allergies? No   Changes to medications? No   New conditions since last clinic visit? No   Unplanned office visit, urgent care, ED, or hospital admission in the last 4 weeks? No   How does patient/caregiver feel medication is working? Good   Financial problems or insurance changes? No   How many doses of your specialty medications were missed in the last 4 weeks? 0   Would patient like to speak to a pharmacist? No   When does the patient need to receive the medication? 02/15/22   Refill Delivery Questions    How will the patient receive the medication? Mail   When does the patient need to receive the medication? 02/15/22   Shipping Address Home   Address in [facility] confirmed and updated if neccessary? Yes   Expected Copay ($) 0   Is the patient able to afford the medication copay? Yes   Payment Method zero copay   Days supply of Refill 30   Supplies needed? No supplies needed   Refill activity completed? Yes   Refill activity plan Refill scheduled   Shipment/Pickup Date: 02/15/22          Current Outpatient Medications   Medication Sig    abacavir-dolutegravir-lamivud (TRIUMEQ) 600- mg Tab Take 1 tablet by mouth once daily.    clorazepate (TRANXENE) 7.5 MG Tab Take half to 1 tablet by mouth 3 times a day as needed    ergocalciferol (VITAMIN D2) 50,000 unit Cap Take 1 capsule (50,000 Units total) by mouth every 7 days.    linaCLOtide (LINZESS) 72 mcg Cap  capsule Take 1 capsule (72 mcg total) by mouth before breakfast.    promethazine (PHENERGAN) 12.5 MG Tab Take 12.5 mg by mouth every 4 (four) hours as needed.    sertraline (ZOLOFT) 100 MG tablet TAKE 1.5 TABLETS (150 MG TOTAL) BY MOUTH ONCE DAILY.    sumatriptan (IMITREX) 100 MG tablet 1 tab oral may repeat every 2 hrs.. Max 2 tabs a day   Last reviewed on 1/13/2022 11:00 AM by Edelmira Ledesma MD    Review of patient's allergies indicates:   Allergen Reactions    Eggs [egg derived] Nausea Only    Ondansetron Dermatitis    Sulfa (sulfonamide antibiotics) Nausea Only    Sulfamethoxazole-trimethoprim Diarrhea    Last reviewed on  1/13/2022 11:00 AM by Edelmira Ledesma      Tasks added this encounter   3/10/2022 - Refill Call (Auto Added)   Tasks due within next 3 months   No tasks due.     [facility] - Specialty Pharm[facility]  [address] [place] [id number]  Phone: [phone]  Fax: [phone]

## 2022-03-10 ENCOUNTER — SPECIALTY PHARMACY (OUTPATIENT)
Dept: PHARMACY | Facility: CLINIC | Age: 32
End: 2022-03-10
Payer: COMMERCIAL

## 2022-03-10 NOTE — TELEPHONE ENCOUNTER
Specialty Pharmacy - Refill Coordination    Specialty Medication Orders Linked to Encounter    Flowsheet Row Most Recent Value   Medication #1 abacavir-dolutegravir-lamivud (TRIUMEQ) 600- mg Tab (Order#[id number], Rx#[id number])          Refill Questions - Documented Responses    Flowsheet Row Most Recent Value   Patient Availability and HIPAA Verification    Does patient want to proceed with activity? Yes   HIPAA/medical authority confirmed? Yes   Relationship to patient of person spoken to? Self   Refill Screening Questions    Changes to allergies? No   Changes to medications? No   New conditions since last clinic visit? No   Unplanned office visit, urgent care, ED, or hospital admission in the last 4 weeks? No   How does patient/caregiver feel medication is working? Good   Financial problems or insurance changes? No   How many doses of your specialty medications were missed in the last 4 weeks? 0   Would patient like to speak to a pharmacist? No   When does the patient need to receive the medication? 03/16/22   Refill Delivery Questions    How will the patient receive the medication? Mail   When does the patient need to receive the medication? 03/16/22   Shipping Address Home   Address in [facility] confirmed and updated if neccessary? Yes   Expected Copay ($) 0   Is the patient able to afford the medication copay? Yes   Payment Method zero copay   Days supply of Refill 30   Supplies needed? No supplies needed   Refill activity completed? Yes   Refill activity plan Refill scheduled   Shipment/Pickup Date: 03/15/22          Current Outpatient Medications   Medication Sig    abacavir-dolutegravir-lamivud (TRIUMEQ) 600- mg Tab Take 1 tablet by mouth once daily.    clorazepate (TRANXENE) 7.5 MG Tab Take half to 1 tablet by mouth 3 times a day as needed    ergocalciferol (VITAMIN D2) 50,000 unit Cap Take 1 capsule (50,000 Units total) by mouth every 7 days.    linaCLOtide (LINZESS) 72 mcg Cap  capsule Take 1 capsule (72 mcg total) by mouth before breakfast.    promethazine (PHENERGAN) 12.5 MG Tab Take 12.5 mg by mouth every 4 (four) hours as needed.    sertraline (ZOLOFT) 100 MG tablet TAKE 1.5 TABLETS (150 MG TOTAL) BY MOUTH ONCE DAILY.    sumatriptan (IMITREX) 100 MG tablet 1 tab oral may repeat every 2 hrs.. Max 2 tabs a day   Last reviewed on 1/13/2022 11:00 AM by Edelmira Ledesma MD    Review of patient's allergies indicates:   Allergen Reactions    Eggs [egg derived] Nausea Only    Ondansetron Dermatitis    Sulfa (sulfonamide antibiotics) Nausea Only    Sulfamethoxazole-trimethoprim Diarrhea    Last reviewed on  1/13/2022 11:00 AM by Edelmira Ledesma      Tasks added this encounter   4/8/2022 - Refill Call (Auto Added)   Tasks due within next 3 months   No tasks due.     [facility] - [facility] Pharm[facility]  [address] [place] [id number]  Phone: [phone]  Fax: [phone]

## 2022-03-31 ENCOUNTER — OFFICE VISIT (OUTPATIENT)
Dept: FAMILY MEDICINE | Facility: CLINIC | Age: 32
End: 2022-03-31
Payer: COMMERCIAL

## 2022-03-31 VITALS
DIASTOLIC BLOOD PRESSURE: 78 MMHG | WEIGHT: 177 LBS | TEMPERATURE: 98 F | BODY MASS INDEX: 28.45 KG/M2 | HEART RATE: 81 BPM | OXYGEN SATURATION: 97 % | SYSTOLIC BLOOD PRESSURE: 118 MMHG | HEIGHT: 66 IN

## 2022-03-31 DIAGNOSIS — S43.499A: Primary | ICD-10-CM

## 2022-03-31 DIAGNOSIS — B96.89 BACTERIAL VAGINOSIS: ICD-10-CM

## 2022-03-31 DIAGNOSIS — B37.31 MONILIAL VAGINITIS: ICD-10-CM

## 2022-03-31 DIAGNOSIS — N76.0 BACTERIAL VAGINOSIS: ICD-10-CM

## 2022-03-31 PROCEDURE — 3078F PR MOST RECENT DIASTOLIC BLOOD PRESSURE < 80 MM HG: ICD-10-PCS | Mod: S$GLB,,, | Performed by: INTERNAL MEDICINE

## 2022-03-31 PROCEDURE — 99213 OFFICE O/P EST LOW 20 MIN: CPT | Mod: S$GLB,,, | Performed by: INTERNAL MEDICINE

## 2022-03-31 PROCEDURE — 1159F PR MEDICATION LIST DOCUMENTED IN MEDICAL RECORD: ICD-10-PCS | Mod: S$GLB,,, | Performed by: INTERNAL MEDICINE

## 2022-03-31 PROCEDURE — 3074F PR MOST RECENT SYSTOLIC BLOOD PRESSURE < 130 MM HG: ICD-10-PCS | Mod: S$GLB,,, | Performed by: INTERNAL MEDICINE

## 2022-03-31 PROCEDURE — 3078F DIAST BP <80 MM HG: CPT | Mod: S$GLB,,, | Performed by: INTERNAL MEDICINE

## 2022-03-31 PROCEDURE — 3008F BODY MASS INDEX DOCD: CPT | Mod: S$GLB,,, | Performed by: INTERNAL MEDICINE

## 2022-03-31 PROCEDURE — 1159F MED LIST DOCD IN RCRD: CPT | Mod: S$GLB,,, | Performed by: INTERNAL MEDICINE

## 2022-03-31 PROCEDURE — 3008F PR BODY MASS INDEX (BMI) DOCUMENTED: ICD-10-PCS | Mod: S$GLB,,, | Performed by: INTERNAL MEDICINE

## 2022-03-31 PROCEDURE — 3074F SYST BP LT 130 MM HG: CPT | Mod: S$GLB,,, | Performed by: INTERNAL MEDICINE

## 2022-03-31 PROCEDURE — 99213 PR OFFICE/OUTPT VISIT, EST, LEVL III, 20-29 MIN: ICD-10-PCS | Mod: S$GLB,,, | Performed by: INTERNAL MEDICINE

## 2022-03-31 RX ORDER — METRONIDAZOLE 500 MG/1
500 TABLET ORAL EVERY 12 HOURS
Qty: 14 TABLET | Refills: 3 | Status: SHIPPED | OUTPATIENT
Start: 2022-03-31 | End: 2022-10-24

## 2022-03-31 RX ORDER — FLUCONAZOLE 150 MG/1
150 TABLET ORAL DAILY
Qty: 1 TABLET | Refills: 0 | Status: SHIPPED | OUTPATIENT
Start: 2022-03-31 | End: 2022-04-01

## 2022-03-31 NOTE — PROGRESS NOTES
Subjective:       Patient ID: Veronique Vick is a 31 y.o. female.    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  There was no direct trauma.  The pain is on both shoulders with what she described as not from possible spasms.  Looking at the possibilities for therapy she prefers to try a referral to physical therapy for dry needling.  This will be arranged.  She can use Tylenol, Biofreeze appointment, and over-the-counter anti-inflammatories with food as needed.  No other pain management is carried out at this time for this.    Patient has been having menorrhagia to a certain extent which has caused significant discomfort that she thought at 1st was related to while candidiasis but because of the failure of Diflucan that she had at home and took as per routine the Flagyl will be prescribed now for also potential component of bacterial vaginosis.  This prescriptions are being issued today.    No other care gaps are pending.    I should mention that the range of motion of her shoulders and neck are preserved but with some discomfort in the extremes of the range of motion.    Review of Systems      Objective:      Physical Exam    Assessment:       Problem List Items Addressed This Visit    None     Visit Diagnoses     Sprain of other part of shoulder region, unspecified laterality, initial encounter    -  Primary    Bacterial vaginosis        Monilial vaginitis              Plan:       Patient has bilateral shoulder sprain related to activity with significant spasm like characteristics that are causing a lot of discomfort and impairing her capacity to move her neck around etc.  The use of over-the-counter medications are not help and she prefers to be evaluated by Physical therapy with to consider dry needling.  Referral for same has been placed.    Because of recurrent bacterial vaginosis and monilial vaginitis type symptomatology  patient has been issued prescriptions today for both the Diflucan and the Flagyl to be use as needed.  Patient is up-to-date with her gyn follow-up.  She has an appointment next month.  No other care gaps are pending.

## 2022-04-05 ENCOUNTER — CLINICAL SUPPORT (OUTPATIENT)
Dept: REHABILITATION | Facility: HOSPITAL | Age: 32
End: 2022-04-05
Payer: COMMERCIAL

## 2022-04-05 DIAGNOSIS — R29.3 POSTURE ABNORMALITY: ICD-10-CM

## 2022-04-05 DIAGNOSIS — S43.499A: ICD-10-CM

## 2022-04-05 DIAGNOSIS — R53.1 DECREASED STRENGTH: ICD-10-CM

## 2022-04-05 DIAGNOSIS — M62.89 MUSCLE TIGHTNESS: ICD-10-CM

## 2022-04-05 PROCEDURE — 97161 PT EVAL LOW COMPLEX 20 MIN: CPT | Mod: PN

## 2022-04-05 PROCEDURE — 97110 THERAPEUTIC EXERCISES: CPT | Mod: PN

## 2022-04-05 PROCEDURE — 97140 MANUAL THERAPY 1/> REGIONS: CPT | Mod: PN

## 2022-04-05 NOTE — PLAN OF CARE
[facility]  Physical Therapy Initial Evaluation    Name: Veronique Vick  Clinic Number: [id number]    Therapy Diagnosis:  Encounter Diagnoses   Name Primary?    Sprain of other part of shoulder region, unspecified laterality, initial encounter     Decreased strength     Muscle tightness     Posture abnormality       Physician: Garrett Marino *    Physician Orders: PT Eval and Treat  Medical Diagnosis from Referral: S43.499A (ICD-10-CM) - Sprain of other part of shoulder region, unspecified laterality, initial encounter   Evaluation Date: 4/5/2022  Authorization Period Expiration: 04/20/2022   Plan of Care Expiration: 6/30/2022    Progress Update: 5/5/2022  FOTO: 1 / 3    Visit # / Visits authorized: 1 / 1      PRECAUTIONS: Standard Precautions     Time In: 1530  Time Out: 1615  Total Appointment Time (timed & untimed codes): 45 minutes    SUBJECTIVE     Chief Complaint:   B upper back pain    History of current condition:  Pain started about 3 years ago.  Denies numbness/tingling.  Pain intensity and frequency has been about the same since onset.  States that she was a competitive swimmer for years which she thinks contributes to her upper back and shoulder pain.      Aggravating Factors:  Random, activity dependent  Easing Factors: heat    Imaging:  See epic    Prior Therapy: N/A  Social History: Pt lives with her son.  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  Prior Level of Function: Independent with all ADLs  Current Level of Function: 95% of PLOF    Pain:  Current 4 /10, worst 6 /10, best 4 /10   Description: Aching and Dull    Pts goals: Pt reported goal is decrease pain.      _______________________________________________________  Medical History:   Past Medical History:   Diagnosis Date    Wrist fracture     left       Surgical History:   Veronique Vick  has a past surgical history that includes [place] tooth extraction and ORIF radius & ulna fractures  (Left, 2016).    Medications:   Veronique has a current medication list which includes the following prescription(s): triumeq, clorazepate, ergocalciferol, linaclotide, metronidazole, promethazine, sertraline, and sumatriptan.    Allergies:   Review of patient's allergies indicates:   Allergen Reactions    Eggs [egg derived] Nausea Only    Ondansetron Dermatitis    Sulfa (sulfonamide antibiotics) Nausea Only    Sulfamethoxazole-trimethoprim Diarrhea        OBJECTIVE   (x = not tested due to pain and/or inability to obtain test position)    RANGE OF MOTION:      Shoulder AROM/PROM Right  4/5/2022 Left  4/5/2022 Goal   Forward Flexion (180) wfl wfl 180     ER at 90 degrees (90) wfl wfl 90     ER at 0 degrees (45)  wfl wfl 45     Functional ER (C7) wfl wfl C7     Functional IR (T10) wfl wfl T10       STRENGTH:    U/E MMT Right  4/5/2022 Goal   Shoulder Flexion 4/5 c pain 5/5 B   Shoulder Abduction 4/5 c pain 5/5 B   Shoulder IR 4/5 5/5 B   Shoulder ER 4/5 5/5 B   Elbow Flexion  4/5 5/5 B   Elbow Extension 4/5 5/5 B     U/E MMT Left  4/5/2022 Goal   Shoulder Flexion 4/5 c pain 5/5 B   Shoulder Abduction 4/5 c pain 5/5 B   Shoulder IR 4/5 5/5 B   Shoulder ER 4/5 5/5 B   Elbow Flexion  4/5 5/5 B   Elbow Extension 4/5 5/5 B     MUSCLE LENGTH:     Muscle Tested  Right  4/5/2022 Left   4/5/2022 Goal   Pectoralis Minor  decreased decreased Normal B   Pectoralis Major decreased decreased Normal B       SPECIAL TESTS:     Right  4/5/2022 Left   4/5/2022 Goal   Sanders Ritesh Negative Negative Negative B    Neers Negative Negative Negative B    Empty Can Negative Negative Negative B    Full Can Negative Negative Negative B    Sulcus Sign Negative Negative Negative B    Drop arm Negative Negative Negative B   Obriens Negative Negative Negative B   Apprehension Negative Negative Negative B       Observation:  No edema, ecchymosis present.    Sensation:  Sensation is intact to light touch    Palpation: Increased tone and  tenderness noted with palpation to B upper traps and periscapular musculature    Posture:  Pt presents with postural abnormalities which include: forward head and rounded shoulders    Gait: N/A    Movement Analysis: N/A    Balance: N/A      FUNCTION:         TREATMENT     Total Treatment time separate from Evaluation: (10) minutes    Cadelaina received therapeutic exercises to develop strength, endurance, ROM, flexibility, and posture for (10) minutes including: x = exercises performed     TherEx 4/5/2022    Upper trap stretch x 3x30 secs   Scapular squeezes x 3x10   Doorway stretch x 3x10 secs          Dry Needling Performed by Villa Warner DPT, Cert. DN: The patient was cleared of all contraindications and educated on the risks and effects of dry needling, and post needling expectations. The patient was agreeable to dry needling treatment. Pt signed consent form pre needling. Dry Needling was performed using 0.30 x 0.50 needles to the R upper trap with electrical stimulation applied at 2 Hz for 10  minutes . No adverse affects were noted.       Plan for Next Visit:     UBE  3/3  Upper trap stretch 3x30 secs  Open books   2x10 B  Prone Is, Ys and Ts  Supine SA punches 3#  3x10  Doorway stretch 3x30 secs  Pushups with plus on wall  3x10  CC Rows 7#  3x10  CC Shoulder extension 7#  3x10  CC SA punches 3#  3x10    Possible dry needling to B upper traps       Home Exercises and Patient Education Provided    Education/Self-Care provided:   Patient educated on the impairments noted above and the effects of physical therapy intervention to improve overall condition and QOL.    Patient was educated on all the above exercise prior/during/after for proper posture, positioning, and execution for safe performance with home exercise program.    Written Home Exercises Provided: yes. Prefers: Electronic Copies  Exercises were reviewed and Millicent was able to demonstrate them prior to the end of the session.  Millicent demonstrated  "good understanding of the education provided.     See EMR under Patient Instructions for exercises provided during therapy sessions.    ASSESSMENT     Veronique is a 31 y.o. female referred to outpatient Physical Therapy with a medical diagnosis of S43.499A (ICD-10-CM) - Sprain of other part of shoulder region, unspecified laterality, initial encounter   .  Veronique presents with clinical signs and symptoms that support this diagnosis with     decreased scapular and shoulder strength, Glenohumeral joint hypomobility, and impaired functional mobility.    The above impairments will be addressed through manual therapy techniques, therapeutic exercises, functional training, and modalities as necessary. Patient was treated and educated on exercises for home program, progression of therapy, and benefits of therapy to achieve full functional mobility.     Pt prognosis is Good.   Pt will benefit from skilled outpatient Physical Therapy to address the deficits stated above and in the chart below, provide pt/family education, and to maximize pt's level of independence.     Plan of care discussed with patient: Yes  Pt's spiritual, cultural and educational needs considered and patient is agreeable to the plan of care and goals as stated below:     Anticipated Barriers for therapy: none    Medical Necessity is demonstrated by the following  History  Co-morbidities and personal factors that may impact the plan of care Co-morbidities:   HIV/AIDS    Personal Factors:   no deficits     low   Examination  Body Structures and Functions, activity limitations and participation restrictions that may impact the plan of care Body Regions:   upper extremities    Body Systems:    gross symmetry  strength  gross coordinated movement    Participation Restrictions:   See above in "Current Level of Function"     Activity limitations:   Learning and applying knowledge  no deficits    General Tasks and Commands  no deficits    Communication  no " deficits    Mobility  no deficits    Self care  no deficits    Domestic Life  no deficits    Interactions/Relationships  no deficits    Life Areas  no deficits    Community and Social Life  community life  recreation and leisure  no deficits         low   Clinical Presentation stable and uncomplicated low   Decision Making/ Complexity Score: low       GOALS:  SHORT TERM GOALS: 4 weeks 4/5/2022   1. Recent signs and systems trend is improving in order to progress towards LTG's.    2. Patient will be independent with HEP in order to further progress and return to maximal function.    3. Pain rating at Worst: 5/10 in order to progress towards increased independence with activity.    4. Patient will be able to correct postural deviations in sitting and standing, to decrease pain and promote postural awareness for injury prevention.       LONG TERM GOALS: 8 weeks 4/5/2022   1. Patient will return to normal ADL, recreational, and work related activities with less pain and limitation.     2. Patient will improve AROM to stated goals in order to return to maximal functional potential.     3. Patient will improve Strength to stated goals of appropriate musculature in order to improve functional independence.     4. Pain Rating at Best: 1/10 to improve Quality of Life.     5. Patient will meet predicted functional outcome (FOTO) score: 75% to increase self-worth & perceived functional ability.    6. Patient will have met/partially met personal goal of being able to work with no pain        PLAN   Plan of care Certification: 4/5/2022 to 6/30/2022    Outpatient Physical Therapy 2 times weekly for 6 weeks to include any combination of the following interventions: virtual visits, dry needling, modalities, electrical stimulation (IFC, Pre-Mod, Attended with Functional Dry Needling), Manual Therapy, Moist Heat/ Ice, Neuromuscular Re-ed, Patient Education, Self Care, Therapeutic Exercise, Functional Training and Therapeutic  Activites     Thank you for this referral.    These services are reasonable and necessary for the conditions set forth above while under my care.    Villa Warner, PT, DPT

## 2022-04-08 DIAGNOSIS — B20 HIV INFECTION, UNSPECIFIED SYMPTOM STATUS: ICD-10-CM

## 2022-04-08 RX ORDER — ABACAVIR SULFATE, DOLUTEGRAVIR SODIUM, LAMIVUDINE 600; 50; 300 MG/1; MG/1; MG/1
1 TABLET, FILM COATED ORAL DAILY
Qty: 90 TABLET | Refills: 1 | Status: SHIPPED | OUTPATIENT
Start: 2022-04-08 | End: 2022-10-10 | Stop reason: SDUPTHER

## 2022-04-12 ENCOUNTER — CLINICAL SUPPORT (OUTPATIENT)
Dept: REHABILITATION | Facility: HOSPITAL | Age: 32
End: 2022-04-12
Payer: COMMERCIAL

## 2022-04-12 ENCOUNTER — OFFICE VISIT (OUTPATIENT)
Dept: OBSTETRICS AND GYNECOLOGY | Facility: CLINIC | Age: 32
End: 2022-04-12
Payer: COMMERCIAL

## 2022-04-12 VITALS
SYSTOLIC BLOOD PRESSURE: 120 MMHG | HEIGHT: 66 IN | BODY MASS INDEX: 28.53 KG/M2 | DIASTOLIC BLOOD PRESSURE: 78 MMHG | WEIGHT: 177.5 LBS

## 2022-04-12 DIAGNOSIS — R29.3 POSTURE ABNORMALITY: ICD-10-CM

## 2022-04-12 DIAGNOSIS — M62.89 MUSCLE TIGHTNESS: ICD-10-CM

## 2022-04-12 DIAGNOSIS — R87.612 LOW GRADE SQUAMOUS INTRAEPITHELIAL LESION ON CYTOLOGIC SMEAR OF CERVIX (LGSIL): Primary | ICD-10-CM

## 2022-04-12 DIAGNOSIS — R53.1 DECREASED STRENGTH: Primary | ICD-10-CM

## 2022-04-12 PROCEDURE — 97140 MANUAL THERAPY 1/> REGIONS: CPT | Mod: PN

## 2022-04-12 PROCEDURE — 1159F PR MEDICATION LIST DOCUMENTED IN MEDICAL RECORD: ICD-10-PCS | Mod: CPTII,S$GLB,, | Performed by: OBSTETRICS & GYNECOLOGY

## 2022-04-12 PROCEDURE — 3078F PR MOST RECENT DIASTOLIC BLOOD PRESSURE < 80 MM HG: ICD-10-PCS | Mod: CPTII,S$GLB,, | Performed by: OBSTETRICS & GYNECOLOGY

## 2022-04-12 PROCEDURE — 88175 CYTOPATH C/V AUTO FLUID REDO: CPT | Performed by: OBSTETRICS & GYNECOLOGY

## 2022-04-12 PROCEDURE — 1159F MED LIST DOCD IN RCRD: CPT | Mod: CPTII,S$GLB,, | Performed by: OBSTETRICS & GYNECOLOGY

## 2022-04-12 PROCEDURE — 99999 PR PBB SHADOW E&M-EST. PATIENT-LVL III: ICD-10-PCS | Mod: PBBFAC,,, | Performed by: OBSTETRICS & GYNECOLOGY

## 2022-04-12 PROCEDURE — 3078F DIAST BP <80 MM HG: CPT | Mod: CPTII,S$GLB,, | Performed by: OBSTETRICS & GYNECOLOGY

## 2022-04-12 PROCEDURE — 87624 HPV HI-RISK TYP POOLED RSLT: CPT | Performed by: OBSTETRICS & GYNECOLOGY

## 2022-04-12 PROCEDURE — 97112 NEUROMUSCULAR REEDUCATION: CPT | Mod: PN

## 2022-04-12 PROCEDURE — 97110 THERAPEUTIC EXERCISES: CPT | Mod: PN

## 2022-04-12 PROCEDURE — 99213 OFFICE O/P EST LOW 20 MIN: CPT | Mod: S$GLB,,, | Performed by: OBSTETRICS & GYNECOLOGY

## 2022-04-12 PROCEDURE — 3008F PR BODY MASS INDEX (BMI) DOCUMENTED: ICD-10-PCS | Mod: CPTII,S$GLB,, | Performed by: OBSTETRICS & GYNECOLOGY

## 2022-04-12 PROCEDURE — 99999 PR PBB SHADOW E&M-EST. PATIENT-LVL III: CPT | Mod: PBBFAC,,, | Performed by: OBSTETRICS & GYNECOLOGY

## 2022-04-12 PROCEDURE — 99213 PR OFFICE/OUTPT VISIT, EST, LEVL III, 20-29 MIN: ICD-10-PCS | Mod: S$GLB,,, | Performed by: OBSTETRICS & GYNECOLOGY

## 2022-04-12 PROCEDURE — 3074F PR MOST RECENT SYSTOLIC BLOOD PRESSURE < 130 MM HG: ICD-10-PCS | Mod: CPTII,S$GLB,, | Performed by: OBSTETRICS & GYNECOLOGY

## 2022-04-12 PROCEDURE — 3074F SYST BP LT 130 MM HG: CPT | Mod: CPTII,S$GLB,, | Performed by: OBSTETRICS & GYNECOLOGY

## 2022-04-12 PROCEDURE — 3008F BODY MASS INDEX DOCD: CPT | Mod: CPTII,S$GLB,, | Performed by: OBSTETRICS & GYNECOLOGY

## 2022-04-12 NOTE — PROGRESS NOTES
[facility]  Physical Therapy Daily Treatment Note     Name: Veronique Vick  Clinic Number: [id number]    Therapy Diagnosis:   Encounter Diagnoses   Name Primary?    Decreased strength Yes    Muscle tightness     Posture abnormality      Physician: Garrett Marino *    Visit Date: 4/12/2022    Physician: Garrett Marino *    Physician Orders: PT Eval and Treat  Medical Diagnosis from Referral: S43.499A (ICD-10-CM) - Sprain of other part of shoulder region, unspecified laterality, initial encounter   Evaluation Date: 4/5/2022  Authorization Period Expiration: 04/20/2022   Plan of Care Expiration: 6/30/2022                Progress Update: 5/5/2022               FOTO: 1 / 3    Visit # / Visits authorized: 1 / 20           Time In: 1105 (Pnt arrived early)  Time Out: 1205  Total Billable Time: 60 minutes    Precautions: Standard; HIV  Insurance: Payor: BLUE CROSS [employer]ENEFIT / Plan: [employer] [place] / Product Type: Self Funded /     Subjective     Pt reports: that she got a migraine the day after the last session.  States that the migraine lasted about 2 days and she had to leave work due to the pain.    She was compliant with home exercise program.  Response to previous treatment: positive   Functional change: n/a    Pain: 4/10  Location: bilateral back      Objective     Millicent received therapeutic exercises to develop strength, endurance, flexibility and posture for 25 minutes including:  During 20 minutes of therapeutic exercise, Millicent, was supervised by a rehabilitation technician under the direction of the treating therapist.    UBE  3/3  Upper trap stretch 3x30 secs  Open books   2x10 B  Prone Is, and Ts   2x10 B  Supine SA punches 3#  3x10  Doorway stretch 3x30 secs - NP  Pushups with plus on wall  3x10 - NP    Millicent received the following manual therapy techniques: Soft tissue Mobilization were applied for 25 minutes, including:    Dry  Needling Performed by Villa Warner, MARNI, Cert. DN: The patient was cleared of all contraindications and educated on the risks and effects of dry needling, and post needling expectations. The patient was agreeable to dry needling treatment. Pt signed consent form pre needling. Dry Needling was performed using 0.30 x 0.50 needles to B upper traps with electrical stimulation applied at 2 Hz for 15  minutes . No adverse affects were noted.  Total needling time = 25 minutes.      Millicent participated in neuromuscular re-education activities to improve: Coordination, Kinesthetic, Sense, Proprioception and Posture for 10 minutes. The following activities were included:    CC Rows 7#  3x10  CC Shoulder extension 7#  3x10  CC SA punches 3#  3x10    Home Exercises Provided and Patient Education Provided     Education provided:   - postural awareness.    Written Home Exercises Provided: yes.  Exercises were reviewed and Millicent was able to demonstrate them prior to the end of the session.  Millicent demonstrated good  understanding of the education provided.     See EMR under Patient Instructions for exercises provided prior visit.    Assessment     Presents with weak periscapular musculature and improving postural awareness.   Reports decreased muscular tightness and pain after dry needling.  Will continue to progress strengthening and postural awareness through therapy.    Millicent is progressing well towards her goals.   Pt prognosis is Good.     Pt will continue to benefit from skilled outpatient physical therapy to address the deficits listed in the problem list box on initial evaluation, provide pt/family education and to maximize pt's level of independence in the home and community environment.     Pt's spiritual, cultural and educational needs considered and pt agreeable to plan of care and goals.    Anticipated barriers to physical therapy: None    Goals:     SHORT TERM GOALS: 4 weeks 4/12/2022     1. Recent signs and systems  trend is improving in order to progress towards LTG's. ongoing   2. Patient will be independent with HEP in order to further progress and return to maximal function. ongoing   3. Pain rating at Worst: 5/10 in order to progress towards increased independence with activity. ongoing   4. Patient will be able to correct postural deviations in sitting and standing, to decrease pain and promote postural awareness for injury prevention.  ongoing      LONG TERM GOALS: 8 weeks 4/12/2022     1. Patient will return to normal ADL, recreational, and work related activities with less pain and limitation.  ongoing   2. Patient will improve AROM to stated goals in order to return to maximal functional potential.  ongoing   3. Patient will improve Strength to stated goals of appropriate musculature in order to improve functional independence.  ongoing   4. Pain Rating at Best: 1/10 to improve Quality of Life.  ongoing   5. Patient will meet predicted functional outcome (FOTO) score: 75% to increase self-worth & perceived functional ability. ongoing   6. Patient will have met/partially met personal goal of being able to work with no pain ongoing         Plan     Continue POC as previously stated.    Villa Warner, PT

## 2022-04-12 NOTE — PROGRESS NOTES
"Chief Complaint   Patient presents with    Repeat pap       History of Present Illness   31 y.o.  female patient presents today for dx PAP#5 today PAP #4= LGSIL 12/2021, PAP #3= ascus wHR-HPV, PAP #2= ASCUS, PAP #1= WNL, LGSIL PAP 7/2020 with negative colpo, newly diagnosed HIV since last visit- on meds. / low viral count.      Past medical and surgical history reviewed.   I have reviewed the patient's medical history in detail and updated the computerized patient record.    Review of patient's allergies indicates:   Allergen Reactions    Eggs [egg derived] Nausea Only    Ondansetron Dermatitis    Sulfa (sulfonamide antibiotics) Nausea Only    Sulfamethoxazole-trimethoprim Diarrhea         Review of Systems - Negative except HPI  GEN ROS: negative for - chills or fever  Breast ROS: negative for breast lumps  Genito-Urinary ROS: no dysuria, trouble voiding, or hematuria      Physical Examination:  /78   Ht 5' 6" (1.676 m)   Wt 80.5 kg (177 lb 7.5 oz)   LMP 03/22/2022   BMI 28.64 kg/m²    Constitutional: She appears alert and responsive. She appears well-developed, well-groomed, and well-nourished. No distress. Thin  HENT:   Head: Normocephalic and atraumatic.   Eyes: Conjunctivae and EOM are normal. No scleral icterus.   Neck: Symmetrical. Normal range of motion. Neck supple. No tracheal deviation present. THYROID: without masses or tenderness.  Cardiovascular: Normal rate, no rhythm abnormality noted. Extremities without swelling or edema, warm.    Pulmonary/Chest: Normal respiratory Effort. No distress or retractions. She exhibits no tenderness.  Abdominal: Soft. She exhibits no distension, hernias or masses. There is no tenderness. No enlargement of liver edge or spleen.  There is no rebound and no guarding.   Genitourinary:    External rectal exam shows no thrombosed external hemorrhoids, no lesions.     Pelvic exam was performed with patient supine.   No labial fusion, and symmetrical.    " There is no rash, lesion or injury on the right labia.   There is no rash, lesion or injury on the left labia.   No bleeding and no signs of injury around the vaginal introitus, urethral meatus is normal size and without prolapse or lesions, urethra well supported. The cervix is visualized with no discharge   No vaginal discharge found.    No significant Cystocele, Enterocele or rectocele, and cervix and uterus well supported.  Musculoskeletal: Normal range of motion.   Lymphadenopathy: No inguinal adenopathy present.   Neurological: She is alert and oriented to person, place, and time. Coordination normal.   Skin: Skin is warm and dry. She is not diaphoretic. No rashes, lesions or ulcers.   Psychiatric: She has a normal mood and affect, oriented to person, place, and time.          Assessment:  LGSIL PAP, neg colpo, PAP #1= WNL, PAP #2= ASCUS, PAP #3 = ASCUSwHR-HPV, PAP #4 = LGSIL  New HIV diagnosis - counseled      Plan:  PAP #5 today, then q 4 months  Follow up with ID as scheduled.   Patient informed will be contacted with results within 2 weeks. Encouraged to please call back or email if she has not heard from us by then.

## 2022-04-14 ENCOUNTER — SPECIALTY PHARMACY (OUTPATIENT)
Dept: PHARMACY | Facility: CLINIC | Age: 32
End: 2022-04-14
Payer: COMMERCIAL

## 2022-04-14 ENCOUNTER — PATIENT MESSAGE (OUTPATIENT)
Dept: PHARMACY | Facility: CLINIC | Age: 32
End: 2022-04-14
Payer: COMMERCIAL

## 2022-04-14 NOTE — TELEPHONE ENCOUNTER
Specialty Pharmacy - Refill Coordination    Specialty Medication Orders Linked to Encounter    Flowsheet Row Most Recent Value   Medication #1 abacavir-dolutegravir-lamivud (TRIUMEQ) 600- mg Tab (Order#[id number], Rx#[id number])          Refill Questions - Documented Responses    Flowsheet Row Most Recent Value   Patient Availability and HIPAA Verification    Does patient want to proceed with activity? Yes   HIPAA/medical authority confirmed? Yes   Relationship to patient of person spoken to? Self   Refill Screening Questions    Changes to allergies? No   Changes to medications? No   New conditions since last clinic visit? No   Unplanned office visit, urgent care, ED, or hospital admission in the last 4 weeks? No   How does patient/caregiver feel medication is working? Good   Financial problems or insurance changes? No   How many doses of your specialty medications were missed in the last 4 weeks? 0   Would patient like to speak to a pharmacist? No   When does the patient need to receive the medication? 04/19/22   Refill Delivery Questions    How will the patient receive the medication? Mail   When does the patient need to receive the medication? 04/19/22   Shipping Address Home   Address in [facility] confirmed and updated if neccessary? Yes   Expected Copay ($) 0   Is the patient able to afford the medication copay? Yes   Payment Method zero copay   Days supply of Refill 30   Supplies needed? No supplies needed   Refill activity completed? Yes   Refill activity plan Refill scheduled   Shipment/Pickup Date: 04/18/22          Current Outpatient Medications   Medication Sig    abacavir-dolutegravir-lamivud (TRIUMEQ) 600- mg Tab Take 1 tablet by mouth once daily.    clorazepate (TRANXENE) 7.5 MG Tab Take half to 1 tablet by mouth 3 times a day as needed    ergocalciferol (VITAMIN D2) 50,000 unit Cap Take 1 capsule (50,000 Units total) by mouth every 7 days.    linaCLOtide (LINZESS) 72 mcg Cap  capsule Take 1 capsule (72 mcg total) by mouth before breakfast.    metroNIDAZOLE (FLAGYL) 500 MG tablet Take 1 tablet (500 mg total) by mouth every 12 (twelve) hours.    promethazine (PHENERGAN) 12.5 MG Tab Take 12.5 mg by mouth every 4 (four) hours as needed.    sertraline (ZOLOFT) 100 MG tablet TAKE 1.5 TABLETS (150 MG TOTAL) BY MOUTH ONCE DAILY.    sumatriptan (IMITREX) 100 MG tablet 1 tab oral may repeat every 2 hrs.. Max 2 tabs a day   Last reviewed on 4/12/2022  8:30 AM by Geena Srivastava LPN    Review of patient's allergies indicates:   Allergen Reactions    Eggs [egg derived] Nausea Only    Ondansetron Dermatitis    Sulfa (sulfonamide antibiotics) Nausea Only    Sulfamethoxazole-trimethoprim Diarrhea    Last reviewed on  4/12/2022 8:30 AM by Geena Srivastava      Tasks added this encounter   5/12/2022 - Refill Call (Auto Added)   Tasks due within next 3 months   No tasks due.     Sally Bardales, PharmD  [facility]  [address] [place] [id number]  Phone: [phone]  Fax: [phone]

## 2022-04-18 ENCOUNTER — PATIENT MESSAGE (OUTPATIENT)
Dept: ADMINISTRATIVE | Facility: OTHER | Age: 32
End: 2022-04-18
Payer: COMMERCIAL

## 2022-04-19 ENCOUNTER — OFFICE VISIT (OUTPATIENT)
Dept: OPTOMETRY | Facility: CLINIC | Age: 32
End: 2022-04-19
Payer: COMMERCIAL

## 2022-04-19 DIAGNOSIS — Z01.00 EXAMINATION OF EYES AND VISION: Primary | ICD-10-CM

## 2022-04-19 DIAGNOSIS — H52.7 REFRACTIVE ERROR: ICD-10-CM

## 2022-04-19 DIAGNOSIS — Z46.0 CONTACT LENS/GLASSES FITTING: Primary | ICD-10-CM

## 2022-04-19 PROCEDURE — 99999 PR PBB SHADOW E&M-EST. PATIENT-LVL III: CPT | Mod: PBBFAC,,, | Performed by: OPTOMETRIST

## 2022-04-19 PROCEDURE — 92015 DETERMINE REFRACTIVE STATE: CPT | Mod: S$GLB,,, | Performed by: OPTOMETRIST

## 2022-04-19 PROCEDURE — 92310 PR CONTACT LENS FITTING (NO CHANGE): ICD-10-PCS | Mod: S$GLB,,, | Performed by: OPTOMETRIST

## 2022-04-19 PROCEDURE — 99999 PR PBB SHADOW E&M-EST. PATIENT-LVL II: ICD-10-PCS | Mod: PBBFAC,,, | Performed by: OPTOMETRIST

## 2022-04-19 PROCEDURE — 92014 COMPRE OPH EXAM EST PT 1/>: CPT | Mod: S$GLB,,, | Performed by: OPTOMETRIST

## 2022-04-19 PROCEDURE — 99999 PR PBB SHADOW E&M-EST. PATIENT-LVL II: CPT | Mod: PBBFAC,,, | Performed by: OPTOMETRIST

## 2022-04-19 PROCEDURE — 99499 UNLISTED E&M SERVICE: CPT | Mod: S$GLB,,, | Performed by: OPTOMETRIST

## 2022-04-19 PROCEDURE — 92310 CONTACT LENS FITTING OU: CPT | Mod: S$GLB,,, | Performed by: OPTOMETRIST

## 2022-04-19 PROCEDURE — 99999 PR PBB SHADOW E&M-EST. PATIENT-LVL III: ICD-10-PCS | Mod: PBBFAC,,, | Performed by: OPTOMETRIST

## 2022-04-19 PROCEDURE — 92014 PR EYE EXAM, EST PATIENT,COMPREHESV: ICD-10-PCS | Mod: S$GLB,,, | Performed by: OPTOMETRIST

## 2022-04-19 PROCEDURE — 99499 NO LOS: ICD-10-PCS | Mod: S$GLB,,, | Performed by: OPTOMETRIST

## 2022-04-19 PROCEDURE — 92015 PR REFRACTION: ICD-10-PCS | Mod: S$GLB,,, | Performed by: OPTOMETRIST

## 2022-04-19 NOTE — PROGRESS NOTES
Assessment /Plan     For exam results, see Encounter Report.    Contact lens/glasses fitting      Patient is here for a comprehensive eye exam and contact lens fit. See other exam visit with same encounter date 04/19/2022 for detailed exam information.

## 2022-04-19 NOTE — PROGRESS NOTES
HPI     Annual Exam      Additional comments: LDE: 03/2021              Comments     32 YO female presents today for an annual eye exam. Patient states that   she is doing well, notes that she feels like her eyes are getting worse.   She would like to try CTL at today's visit as well, and needs new glasses.   She does wear her glasses majority of the time but does find that they   make star burst worse at night time.           Last edited by Clover Lepe, PCT on 4/19/2022  8:31 AM. (History)            Assessment /Plan     For exam results, see Encounter Report.    Examination of eyes and vision    Refractive error      1. Examination of eyes and vision  Good ocular health OU    2. Refractive error  Dispensed updated spectacle Rx. Discussed various spectacle lens options, pt would like to try astigmatic correction for nighttime driving. Discussed adaptation period to new specs.  Demonstrated new spec Rx vs current specs in phoropter with patient satisfaction.  Discussed cls options, pt would like to try dailies, dispensed B&L Infuse. Discussed proper hand hygiene and wear/care of lenses. Daily wear only, dispose of daily. Do not sleep/swim/shower in lenses. Discontinue CL wear ASAP and RTC if any redness or discomfort occurs.   Pt successfully completed I&R in office, return in 1 week for clfu, sooner prn      RTC in 1 week for clfu

## 2022-04-26 ENCOUNTER — CLINICAL SUPPORT (OUTPATIENT)
Dept: REHABILITATION | Facility: HOSPITAL | Age: 32
End: 2022-04-26
Payer: COMMERCIAL

## 2022-04-26 DIAGNOSIS — M62.89 MUSCLE TIGHTNESS: ICD-10-CM

## 2022-04-26 DIAGNOSIS — R29.3 POSTURE ABNORMALITY: ICD-10-CM

## 2022-04-26 DIAGNOSIS — R53.1 DECREASED STRENGTH: Primary | ICD-10-CM

## 2022-04-26 PROCEDURE — 97140 MANUAL THERAPY 1/> REGIONS: CPT | Mod: PN

## 2022-04-26 PROCEDURE — 97110 THERAPEUTIC EXERCISES: CPT | Mod: PN

## 2022-04-26 NOTE — PROGRESS NOTES
[facility]  Physical Therapy Daily Treatment Note     Name: Veronique Vick  Clinic Number: [id number]    Therapy Diagnosis:   Encounter Diagnoses   Name Primary?    Decreased strength Yes    Muscle tightness     Posture abnormality      Physician: Garrett Marino *    Visit Date: 4/26/2022    Physician: Garrett Marino *    Physician Orders: PT Eval and Treat  Medical Diagnosis from Referral: S43.499A (ICD-10-CM) - Sprain of other part of shoulder region, unspecified laterality, initial encounter   Evaluation Date: 4/5/2022  Authorization Period Expiration: 04/20/2022   Plan of Care Expiration: 6/30/2022                Progress Update: 5/5/2022               FOTO: 1 / 3    Visit # / Visits authorized: 1 / 20           Time In: 0900  Time Out: 0940  Total Billable Time: 40 minutes    Precautions: Standard; HIV  Insurance: Payor: [employer] B[employer]EFIT / Plan: [employer] [place] / Product Type: Self Funded /     Subjective     Pt reports: that she got significant pain relief after last session.     She was compliant with home exercise program.  Response to previous treatment: positive   Functional change: n/a    Pain: 6/10  Location: bilateral back      Objective     Millicent received therapeutic exercises to develop strength, endurance, flexibility and posture for 10 minutes including:  During 20 minutes of therapeutic exercise, Millicent, was supervised by a rehabilitation technician under the direction of the treating therapist.    UBE  3/3  Upper trap stretch 3x30 secs  Open books   2x10 B - NP  Prone Is, and Ts   2x10 B - NP  Supine SA punches 3#  3x10 - NP  Doorway stretch 3x30 secs - NP  Pushups with plus on wall  3x10 - NP    Millicent received the following manual therapy techniques: Soft tissue Mobilization were applied for 30 minutes, including:    Dry Needling Performed by Villa Warner, MARNI, Cert. DN: The patient was cleared of all  contraindications and educated on the risks and effects of dry needling, and post needling expectations. The patient was agreeable to dry needling treatment. Pt signed consent form pre needling. Dry Needling was performed using 0.30 x 0.50 needles to B upper traps with electrical stimulation applied at 2 Hz for 20  minutes . No adverse affects were noted.  Total needling time = 30 minutes.      Millicent participated in neuromuscular re-education activities to improve: Coordination, Kinesthetic, Sense, Proprioception and Posture for 0 minutes. The following activities were included:    CC Rows 7#  3x10  CC Shoulder extension 7#  3x10  CC SA punches 3#  3x10    Home Exercises Provided and Patient Education Provided     Education provided:   - postural awareness.    Written Home Exercises Provided: yes.  Exercises were reviewed and Millicent was able to demonstrate them prior to the end of the session.  Millicent demonstrated good  understanding of the education provided.     See EMR under Patient Instructions for exercises provided prior visit.    Assessment     Presents with weak periscapular musculature and improving postural awareness.   Reports decreased muscular tightness and pain after dry needling.  Will continue to progress strengthening and postural awareness through therapy.    Millicent is progressing well towards her goals.   Pt prognosis is Good.     Pt will continue to benefit from skilled outpatient physical therapy to address the deficits listed in the problem list box on initial evaluation, provide pt/family education and to maximize pt's level of independence in the home and community environment.     Pt's spiritual, cultural and educational needs considered and pt agreeable to plan of care and goals.    Anticipated barriers to physical therapy: None    Goals:     SHORT TERM GOALS: 4 weeks 4/26/2022     1. Recent signs and systems trend is improving in order to progress towards LTG's. ongoing   2. Patient will be  independent with HEP in order to further progress and return to maximal function. ongoing   3. Pain rating at Worst: 5/10 in order to progress towards increased independence with activity. ongoing   4. Patient will be able to correct postural deviations in sitting and standing, to decrease pain and promote postural awareness for injury prevention.  ongoing      LONG TERM GOALS: 8 weeks 4/26/2022     1. Patient will return to normal ADL, recreational, and work related activities with less pain and limitation.  ongoing   2. Patient will improve AROM to stated goals in order to return to maximal functional potential.  ongoing   3. Patient will improve Strength to stated goals of appropriate musculature in order to improve functional independence.  ongoing   4. Pain Rating at Best: 1/10 to improve Quality of Life.  ongoing   5. Patient will meet predicted functional outcome (FOTO) score: 75% to increase self-worth & perceived functional ability. ongoing   6. Patient will have met/partially met personal goal of being able to work with no pain ongoing         Plan     Continue POC as previously stated.    Villa Warner, PT

## 2022-05-10 ENCOUNTER — CLINICAL SUPPORT (OUTPATIENT)
Dept: REHABILITATION | Facility: HOSPITAL | Age: 32
End: 2022-05-10
Payer: COMMERCIAL

## 2022-05-10 DIAGNOSIS — R53.1 DECREASED STRENGTH: Primary | ICD-10-CM

## 2022-05-10 DIAGNOSIS — M62.89 MUSCLE TIGHTNESS: ICD-10-CM

## 2022-05-10 DIAGNOSIS — R29.3 POSTURE ABNORMALITY: ICD-10-CM

## 2022-05-10 PROCEDURE — 97112 NEUROMUSCULAR REEDUCATION: CPT | Mod: PN,CQ

## 2022-05-10 PROCEDURE — 97110 THERAPEUTIC EXERCISES: CPT | Mod: PN,CQ

## 2022-05-10 NOTE — PROGRESS NOTES
[facility]  Physical Therapy Daily Treatment Note     Name: Veronique Vick  Clinic Number: [id number]    Therapy Diagnosis:   Encounter Diagnoses   Name Primary?    Decreased strength Yes    Muscle tightness     Posture abnormality      Physician: Garrett Marino *    Visit Date: 5/10/2022    Physician: Garrett Marino *    Physician Orders: PT Eval and Treat  Medical Diagnosis from Referral: S43.499A (ICD-10-CM) - Sprain of other part of shoulder region, unspecified laterality, initial encounter   Evaluation Date: 4/5/2022  Authorization Period Expiration: 04/20/2022   Plan of Care Expiration: 6/30/2022                Progress Update: 5/5/2022               FOTO: 1 / 3    Visit # / Visits authorized: 2 / 20           Time In: 830  Time Out: 910  Total Billable Time: 40 minutes    Precautions: Standard; HIV  Insurance: Payor: [employer] B[employer]EFIT / Plan: [employer] [place] / Product Type: Self Funded /     Subjective     Pt reports:  No pain upon arrival.   She was compliant with home exercise program.  Response to previous treatment: no issues   Functional change: n/a    Pain: 0/10  Location: bilateral back      Objective     Millicent received therapeutic exercises to develop strength, endurance, flexibility and posture for 10 minutes including:  During 20 minutes of therapeutic exercise, Millicent, was supervised by a rehabilitation technician under the direction of the treating therapist.    UBE  3/3  Upper trap stretch 3x30 secs  Open books   2x10 B   Prone Is, and Ts   2x10 B   Supine SA punches 3#  3x10   Doorway stretch 3x30 secs   Pushups with plus on wall  3x10 -- not today     Millicent received the following manual therapy techniques: Soft tissue Mobilization were applied for 0 minutes, including:    Not today:   Dry Needling Performed by Villa Warner, MARNI, Cert. DN: The patient was cleared of all contraindications and educated on the  risks and effects of dry needling, and post needling expectations. The patient was agreeable to dry needling treatment. Pt signed consent form pre needling. Dry Needling was performed using 0.30 x 0.50 needles to B upper traps with electrical stimulation applied at 2 Hz for 20  minutes . No adverse affects were noted.  Total needling time = 0 minutes.      Millicent participated in neuromuscular re-education activities to improve: Coordination, Kinesthetic, Sense, Proprioception and Posture for 10 minutes. The following activities were included:    CC Rows 7#  3x10  CC Shoulder extension 7#  3x10  CC SA punches 3#  3x10      Home Exercises Provided and Patient Education Provided     Education provided:   - postural awareness.    Written Home Exercises Provided: yes.  Exercises were reviewed and Millicent was able to demonstrate them prior to the end of the session.  Millicent demonstrated good  understanding of the education provided.     See EMR under Patient Instructions for exercises provided prior visit.    Assessment     Patient tolerated session well, still some tightness in the posterior aspect of the left shoulder near the vertebral border of the scapula. Continue to progress shoulder endurance and stability to decrease cervical tension.     Millicent is progressing well towards her goals.   Pt prognosis is Good.     Pt will continue to benefit from skilled outpatient physical therapy to address the deficits listed in the problem list box on initial evaluation, provide pt/family education and to maximize pt's level of independence in the home and community environment.     Pt's spiritual, cultural and educational needs considered and pt agreeable to plan of care and goals.    Anticipated barriers to physical therapy: None    Goals:     SHORT TERM GOALS: 4 weeks 5/10/2022     1. Recent signs and systems trend is improving in order to progress towards LTG's. ongoing   2. Patient will be independent with HEP in order to  further progress and return to maximal function. ongoing   3. Pain rating at Worst: 5/10 in order to progress towards increased independence with activity. ongoing   4. Patient will be able to correct postural deviations in sitting and standing, to decrease pain and promote postural awareness for injury prevention.  ongoing      LONG TERM GOALS: 8 weeks 5/10/2022     1. Patient will return to normal ADL, recreational, and work related activities with less pain and limitation.  ongoing   2. Patient will improve AROM to stated goals in order to return to maximal functional potential.  ongoing   3. Patient will improve Strength to stated goals of appropriate musculature in order to improve functional independence.  ongoing   4. Pain Rating at Best: 1/10 to improve Quality of Life.  ongoing   5. Patient will meet predicted functional outcome (FOTO) score: 75% to increase self-worth & perceived functional ability. ongoing   6. Patient will have met/partially met personal goal of being able to work with no pain ongoing         Plan     Continue POC as previously stated.    Mami Martin, PTA

## 2022-05-11 ENCOUNTER — TELEPHONE (OUTPATIENT)
Dept: FAMILY MEDICINE | Facility: CLINIC | Age: 32
End: 2022-05-11

## 2022-05-11 DIAGNOSIS — Z71.84 TRAVEL ADVICE ENCOUNTER: Primary | ICD-10-CM

## 2022-05-12 ENCOUNTER — SPECIALTY PHARMACY (OUTPATIENT)
Dept: PHARMACY | Facility: CLINIC | Age: 32
End: 2022-05-12
Payer: COMMERCIAL

## 2022-05-12 NOTE — TELEPHONE ENCOUNTER
Specialty Pharmacy - Refill Coordination    Specialty Medication Orders Linked to Encounter    Flowsheet Row Most Recent Value   Medication #1 abacavir-dolutegravir-lamivud (TRIUMEQ) 600- mg Tab (Order#[id number], Rx#[id number])          Refill Questions - Documented Responses    Flowsheet Row Most Recent Value   Patient Availability and HIPAA Verification    Does patient want to proceed with activity? Yes   HIPAA/medical authority confirmed? Yes   Relationship to patient of person spoken to? Self   Refill Screening Questions    Changes to allergies? No   Changes to medications? No   New conditions since last clinic visit? No   Unplanned office visit, urgent care, ED, or hospital admission in the last 4 weeks? No   How does patient/caregiver feel medication is working? Good   Financial problems or insurance changes? No   How many doses of your specialty medications were missed in the last 4 weeks? 0   Would patient like to speak to a pharmacist? No   When does the patient need to receive the medication? 05/18/22   Refill Delivery Questions    How will the patient receive the medication? Mail   When does the patient need to receive the medication? 05/18/22   Shipping Address Home   Address in [facility] confirmed and updated if neccessary? Yes   Expected Copay ($) 0   Is the patient able to afford the medication copay? Yes   Payment Method zero copay   Days supply of Refill 30   Supplies needed? No supplies needed   Refill activity completed? Yes   Refill activity plan Refill scheduled   Shipment/Pickup Date: 05/16/22          Current Outpatient Medications   Medication Sig    abacavir-dolutegravir-lamivud (TRIUMEQ) 600- mg Tab Take 1 tablet by mouth once daily.    clorazepate (TRANXENE) 7.5 MG Tab Take half to 1 tablet by mouth 3 times a day as needed    ergocalciferol (VITAMIN D2) 50,000 unit Cap Take 1 capsule (50,000 Units total) by mouth every 7 days.    linaCLOtide (LINZESS) 72 mcg Cap  capsule Take 1 capsule (72 mcg total) by mouth before breakfast.    metroNIDAZOLE (FLAGYL) 500 MG tablet Take 1 tablet (500 mg total) by mouth every 12 (twelve) hours.    promethazine (PHENERGAN) 12.5 MG Tab Take 12.5 mg by mouth every 4 (four) hours as needed.    sertraline (ZOLOFT) 100 MG tablet TAKE 1.5 TABLETS (150 MG TOTAL) BY MOUTH ONCE DAILY.    sumatriptan (IMITREX) 100 MG tablet 1 tab oral may repeat every 2 hrs.. Max 2 tabs a day   Last reviewed on 4/19/2022  9:16 AM by Maximilian Amaya, OD    Review of patient's allergies indicates:   Allergen Reactions    Eggs [egg derived] Nausea Only    Ondansetron Dermatitis    Sulfa (sulfonamide antibiotics) Nausea Only    Sulfamethoxazole-trimethoprim Diarrhea    Last reviewed on  4/19/2022 9:16 AM by Maximilian Amaya      Tasks added this encounter   6/10/2022 - Refill Call (Auto Added)   Tasks due within next 3 months   No tasks due.     Judith Merino  [facility]  [address] [place] [id number]  Phone: [phone]  Fax: [phone]

## 2022-05-16 ENCOUNTER — PATIENT MESSAGE (OUTPATIENT)
Dept: OBSTETRICS AND GYNECOLOGY | Facility: CLINIC | Age: 32
End: 2022-05-16
Payer: COMMERCIAL

## 2022-05-16 ENCOUNTER — CLINICAL SUPPORT (OUTPATIENT)
Dept: FAMILY MEDICINE | Facility: CLINIC | Age: 32
End: 2022-05-16
Payer: COMMERCIAL

## 2022-05-16 DIAGNOSIS — Z11.52 ENCOUNTER FOR SCREENING FOR COVID-19: Primary | ICD-10-CM

## 2022-05-16 DIAGNOSIS — Z11.52 ENCOUNTER FOR SCREENING FOR COVID-19: ICD-10-CM

## 2022-05-16 DIAGNOSIS — A09 DIARRHEA OF INFECTIOUS ORIGIN: Primary | ICD-10-CM

## 2022-05-16 LAB — SARS-COV-2 RNA RESP QL NAA+PROBE: NOT DETECTED

## 2022-05-16 PROCEDURE — U0005 INFEC AGEN DETEC AMPLI PROBE: HCPCS | Performed by: STUDENT IN AN ORGANIZED HEALTH CARE EDUCATION/TRAINING PROGRAM

## 2022-05-16 PROCEDURE — U0003 INFECTIOUS AGENT DETECTION BY NUCLEIC ACID (DNA OR RNA); SEVERE ACUTE RESPIRATORY SYNDROME CORONAVIRUS 2 (SARS-COV-2) (CORONAVIRUS DISEASE [COVID-19]), AMPLIFIED PROBE TECHNIQUE, MAKING USE OF HIGH THROUGHPUT TECHNOLOGIES AS DESCRIBED BY CMS-2020-01-R: HCPCS | Performed by: STUDENT IN AN ORGANIZED HEALTH CARE EDUCATION/TRAINING PROGRAM

## 2022-05-16 RX ORDER — DIPHENOXYLATE HYDROCHLORIDE AND ATROPINE SULFATE 2.5; .025 MG/1; MG/1
1 TABLET ORAL 4 TIMES DAILY PRN
Qty: 15 TABLET | Refills: 0 | Status: SHIPPED | OUTPATIENT
Start: 2022-05-16 | End: 2022-05-26

## 2022-05-16 RX ORDER — CIPROFLOXACIN 500 MG/1
500 TABLET ORAL 2 TIMES DAILY
Qty: 10 TABLET | Refills: 0 | Status: SHIPPED | OUTPATIENT
Start: 2022-05-16 | End: 2022-05-21

## 2022-05-19 RX ORDER — ETONOGESTREL AND ETHINYL ESTRADIOL VAGINAL RING .015; .12 MG/D; MG/D
1 RING VAGINAL
Qty: 1 EACH | Refills: 11 | Status: SHIPPED | OUTPATIENT
Start: 2022-05-19 | End: 2022-11-29

## 2022-05-25 ENCOUNTER — PATIENT MESSAGE (OUTPATIENT)
Dept: INFECTIOUS DISEASES | Facility: CLINIC | Age: 32
End: 2022-05-25

## 2022-05-25 ENCOUNTER — CLINICAL SUPPORT (OUTPATIENT)
Dept: FAMILY MEDICINE | Facility: CLINIC | Age: 32
End: 2022-05-25
Payer: COMMERCIAL

## 2022-05-25 DIAGNOSIS — Z79.899 ENCOUNTER FOR LONG-TERM (CURRENT) USE OF MEDICATIONS: Primary | ICD-10-CM

## 2022-05-25 DIAGNOSIS — J04.0 LARYNGITIS: Primary | ICD-10-CM

## 2022-05-25 DIAGNOSIS — E55.9 VITAMIN D DEFICIENCY: ICD-10-CM

## 2022-05-25 DIAGNOSIS — J02.9 PHARYNGITIS, UNSPECIFIED ETIOLOGY: ICD-10-CM

## 2022-05-25 DIAGNOSIS — B20 HIV INFECTION, UNSPECIFIED SYMPTOM STATUS: ICD-10-CM

## 2022-05-25 DIAGNOSIS — J40 BRONCHITIS: ICD-10-CM

## 2022-05-25 PROCEDURE — 96372 PR INJECTION,THERAP/PROPH/DIAG2ST, IM OR SUBCUT: ICD-10-PCS | Mod: S$GLB,,, | Performed by: STUDENT IN AN ORGANIZED HEALTH CARE EDUCATION/TRAINING PROGRAM

## 2022-05-25 PROCEDURE — 96372 THER/PROPH/DIAG INJ SC/IM: CPT | Mod: S$GLB,,, | Performed by: STUDENT IN AN ORGANIZED HEALTH CARE EDUCATION/TRAINING PROGRAM

## 2022-05-25 RX ORDER — DEXAMETHASONE SODIUM PHOSPHATE 4 MG/ML
4 INJECTION, SOLUTION INTRA-ARTICULAR; INTRALESIONAL; INTRAMUSCULAR; INTRAVENOUS; SOFT TISSUE
Status: COMPLETED | OUTPATIENT
Start: 2022-05-25 | End: 2022-05-25

## 2022-05-25 RX ORDER — METHYLPREDNISOLONE ACETATE 40 MG/ML
40 INJECTION, SUSPENSION INTRA-ARTICULAR; INTRALESIONAL; INTRAMUSCULAR; SOFT TISSUE
Status: COMPLETED | OUTPATIENT
Start: 2022-05-25 | End: 2022-05-25

## 2022-05-25 RX ADMIN — DEXAMETHASONE SODIUM PHOSPHATE 4 MG: 4 INJECTION, SOLUTION INTRA-ARTICULAR; INTRALESIONAL; INTRAMUSCULAR; INTRAVENOUS; SOFT TISSUE at 08:05

## 2022-05-25 RX ADMIN — METHYLPREDNISOLONE ACETATE 40 MG: 40 INJECTION, SUSPENSION INTRA-ARTICULAR; INTRALESIONAL; INTRAMUSCULAR; SOFT TISSUE at 08:05

## 2022-05-25 NOTE — PROGRESS NOTES
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  She was exposed to climate change, travel, hotels, airplanes.  She is having laryngitis, pharyngitis, cough.   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  Evaluate for other cause if no improvement. Consider covid testing.

## 2022-05-25 NOTE — PROGRESS NOTES
dexamethasone injection 4 mg   and  methylPREDNISolone acetate injection 40 mg   given in right dorsalgluteal as order by Dr. Palafox.  Patient tolerated well.

## 2022-05-31 ENCOUNTER — CLINICAL SUPPORT (OUTPATIENT)
Dept: REHABILITATION | Facility: HOSPITAL | Age: 32
End: 2022-05-31
Payer: COMMERCIAL

## 2022-05-31 DIAGNOSIS — R29.3 POSTURE ABNORMALITY: ICD-10-CM

## 2022-05-31 DIAGNOSIS — M62.89 MUSCLE TIGHTNESS: ICD-10-CM

## 2022-05-31 DIAGNOSIS — R53.1 DECREASED STRENGTH: Primary | ICD-10-CM

## 2022-05-31 PROCEDURE — 97110 THERAPEUTIC EXERCISES: CPT | Mod: PN

## 2022-05-31 PROCEDURE — 97112 NEUROMUSCULAR REEDUCATION: CPT | Mod: PN

## 2022-05-31 PROCEDURE — 97140 MANUAL THERAPY 1/> REGIONS: CPT | Mod: PN

## 2022-05-31 NOTE — PROGRESS NOTES
[facility]  Physical Therapy Daily Treatment Note     Name: Veronique Vick  Clinic Number: [id number]    Therapy Diagnosis:   Encounter Diagnoses   Name Primary?    Decreased strength Yes    Muscle tightness     Posture abnormality      Physician: Garrett Marino *    Visit Date: 5/31/2022    Physician: Garrett Marino *    Physician Orders: PT Eval and Treat  Medical Diagnosis from Referral: S43.499A (ICD-10-CM) - Sprain of other part of shoulder region, unspecified laterality, initial encounter   Evaluation Date: 4/5/2022  Authorization Period Expiration: 04/20/2022   Plan of Care Expiration: 6/30/2022                Progress Update: 5/5/2022               FOTO: 1 / 3    Visit # / Visits authorized: 4 / 20           Time In: 0854  Time Out: 0945  Total Billable Time: 51 minutes    Precautions: Standard; HIV  Insurance: Payor: [employer] BENEFIT / Plan: [employer] [place] / Product Type: Self Funded /     Subjective     Pt reports:  That she is improving.  States that she has been doing her exercises at home.   She was compliant with home exercise program.  Response to previous treatment: no issues   Functional change: n/a    Pain: 3/10  Location: bilateral back      Objective     Millicent received therapeutic exercises to develop strength, endurance, flexibility and posture for 10 minutes including:  During 20 minutes of therapeutic exercise, Millicent, was supervised by a rehabilitation technician under the direction of the treating therapist.    UBE  3/3  Upper trap stretch 3x30 secs  Open books   2x10 B   Prone Is, and Ts   2x10 B - NP  Supine SA punches 3#  3x10   Doorway stretch 3x30 secs - NP  Pushups with plus on wall  3x10 - NP    Millicent received the following manual therapy techniques: Soft tissue Mobilization were applied for 30 minutes, including:    Dry Needling Performed by Villa Warner, MARNI, Cert. DN: The patient was cleared of  all contraindications and educated on the risks and effects of dry needling, and post needling expectations. The patient was agreeable to dry needling treatment. Pt signed consent form pre needling. Dry Needling was performed using 0.30 x 0.50 needles to B upper traps with electrical stimulation applied at 2 Hz for 30 minutes . No adverse affects were noted.  Total needling time = 30 minutes.    Millicent participated in neuromuscular re-education activities to improve: Coordination, Kinesthetic, Sense, Proprioception and Posture for 11 minutes. The following activities were included:    CC Rows 7#  3x10  CC Shoulder extension 7#  3x10  CC SA punches 7#  3x10    Home Exercises Provided and Patient Education Provided     Education provided:   - postural awareness.    Written Home Exercises Provided: yes.  Exercises were reviewed and Millicent was able to demonstrate them prior to the end of the session.  Millicent demonstrated good  understanding of the education provided.     See EMR under Patient Instructions for exercises provided prior visit.    Assessment     Patient tolerated session well, still some tightness in the posterior aspect of the left shoulder near the vertebral border of the scapula. Continue to progress shoulder endurance and stability to decrease cervical tension.     Millicent is progressing well towards her goals.   Pt prognosis is Good.     Pt will continue to benefit from skilled outpatient physical therapy to address the deficits listed in the problem list box on initial evaluation, provide pt/family education and to maximize pt's level of independence in the home and community environment.     Pt's spiritual, cultural and educational needs considered and pt agreeable to plan of care and goals.    Anticipated barriers to physical therapy: None    Goals:     SHORT TERM GOALS: 4 weeks 5/31/2022     1. Recent signs and systems trend is improving in order to progress towards LTG's. ongoing   2. Patient will be  independent with HEP in order to further progress and return to maximal function. ongoing   3. Pain rating at Worst: 5/10 in order to progress towards increased independence with activity. ongoing   4. Patient will be able to correct postural deviations in sitting and standing, to decrease pain and promote postural awareness for injury prevention.  ongoing      LONG TERM GOALS: 8 weeks 5/31/2022     1. Patient will return to normal ADL, recreational, and work related activities with less pain and limitation.  ongoing   2. Patient will improve AROM to stated goals in order to return to maximal functional potential.  ongoing   3. Patient will improve Strength to stated goals of appropriate musculature in order to improve functional independence.  ongoing   4. Pain Rating at Best: 1/10 to improve Quality of Life.  ongoing   5. Patient will meet predicted functional outcome (FOTO) score: 75% to increase self-worth & perceived functional ability. ongoing   6. Patient will have met/partially met personal goal of being able to work with no pain ongoing         Plan     Continue POC as previously stated.    Villa Warner, PT

## 2022-06-06 ENCOUNTER — OFFICE VISIT (OUTPATIENT)
Dept: FAMILY MEDICINE | Facility: CLINIC | Age: 32
End: 2022-06-06
Payer: COMMERCIAL

## 2022-06-06 ENCOUNTER — SPECIALTY PHARMACY (OUTPATIENT)
Dept: PHARMACY | Facility: CLINIC | Age: 32
End: 2022-06-06
Payer: COMMERCIAL

## 2022-06-06 VITALS
RESPIRATION RATE: 16 BRPM | WEIGHT: 174 LBS | SYSTOLIC BLOOD PRESSURE: 110 MMHG | HEART RATE: 58 BPM | BODY MASS INDEX: 27.97 KG/M2 | HEIGHT: 66 IN | DIASTOLIC BLOOD PRESSURE: 64 MMHG | OXYGEN SATURATION: 99 %

## 2022-06-06 DIAGNOSIS — B20 HUMAN IMMUNODEFICIENCY VIRUS (HIV) DISEASE: Primary | ICD-10-CM

## 2022-06-06 DIAGNOSIS — N62 LARGE BREASTS: ICD-10-CM

## 2022-06-06 DIAGNOSIS — S33.5XXA LUMBAR SPRAIN, INITIAL ENCOUNTER: Primary | ICD-10-CM

## 2022-06-06 PROCEDURE — 99212 OFFICE O/P EST SF 10 MIN: CPT | Mod: S$GLB,,, | Performed by: INTERNAL MEDICINE

## 2022-06-06 PROCEDURE — 3074F SYST BP LT 130 MM HG: CPT | Mod: S$GLB,,, | Performed by: INTERNAL MEDICINE

## 2022-06-06 PROCEDURE — 3008F PR BODY MASS INDEX (BMI) DOCUMENTED: ICD-10-PCS | Mod: S$GLB,,, | Performed by: INTERNAL MEDICINE

## 2022-06-06 PROCEDURE — 1159F MED LIST DOCD IN RCRD: CPT | Mod: S$GLB,,, | Performed by: INTERNAL MEDICINE

## 2022-06-06 PROCEDURE — 99212 PR OFFICE/OUTPT VISIT, EST, LEVL II, 10-19 MIN: ICD-10-PCS | Mod: S$GLB,,, | Performed by: INTERNAL MEDICINE

## 2022-06-06 PROCEDURE — 3008F BODY MASS INDEX DOCD: CPT | Mod: S$GLB,,, | Performed by: INTERNAL MEDICINE

## 2022-06-06 PROCEDURE — 3078F DIAST BP <80 MM HG: CPT | Mod: S$GLB,,, | Performed by: INTERNAL MEDICINE

## 2022-06-06 PROCEDURE — 1159F PR MEDICATION LIST DOCUMENTED IN MEDICAL RECORD: ICD-10-PCS | Mod: S$GLB,,, | Performed by: INTERNAL MEDICINE

## 2022-06-06 PROCEDURE — 3078F PR MOST RECENT DIASTOLIC BLOOD PRESSURE < 80 MM HG: ICD-10-PCS | Mod: S$GLB,,, | Performed by: INTERNAL MEDICINE

## 2022-06-06 PROCEDURE — 3074F PR MOST RECENT SYSTOLIC BLOOD PRESSURE < 130 MM HG: ICD-10-PCS | Mod: S$GLB,,, | Performed by: INTERNAL MEDICINE

## 2022-06-06 RX ORDER — DICLOFENAC SODIUM 75 MG/1
75 TABLET, DELAYED RELEASE ORAL 2 TIMES DAILY PRN
Qty: 30 TABLET | Refills: 1 | Status: SHIPPED | OUTPATIENT
Start: 2022-06-06 | End: 2023-01-18

## 2022-06-06 RX ORDER — TRAMADOL HYDROCHLORIDE 50 MG/1
TABLET ORAL
Qty: 40 TABLET | Refills: 0 | Status: SHIPPED | OUTPATIENT
Start: 2022-06-06 | End: 2022-07-20

## 2022-06-06 NOTE — PROGRESS NOTES
Subjective:       Patient ID: Veronique Vick is a 31 y.o. female.    Chief Complaint: Follow-up (Midline back pain)    Patient presents today because of midline back pain on related to any history of trauma.  There is no clear-cut radiation in a sciatica like distribution.  Patient denies fasciculation.  There has been no difficulty with elimination habits.  She can only flex for approximately 15° because of the pain starting with that amount of flexion.  She can walk on her tiptoes and on her heels.  There is good quad muscle strength and function.  In a scale 1-10 with regards to severity the pain at the maximum is a 8. She did have improvement with the use of Tylenol and ibuprofen throughout the we can.  She has not taking any controlled substances.  She applied Biofreeze to the area all trying to get some relief.  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  Patient is currently at a 34 F bra size.  She could use a wider brown strap size to try and help with this problem but eventually appears like she is going to benefit from mastopexy.  Referral regarding same will be carried out at a different time    Review of Systems   All other systems reviewed and are negative.        Objective:      Physical Exam  Vitals and nursing note reviewed.   Cardiovascular:      Rate and Rhythm: Normal rate and regular rhythm.      Pulses: Normal pulses.      Heart sounds: Normal heart sounds.   Pulmonary:      Effort: Pulmonary effort is normal.      Breath sounds: Normal breath sounds.   Musculoskeletal:      Right lower leg: No edema.      Left lower leg: No edema.   Skin:     General: Skin is warm and dry.      Capillary Refill: Capillary refill takes less than 2 seconds.      Coloration: Skin is not jaundiced.   Neurological:      General: No focal deficit  present.      Mental Status: She is alert and oriented to person, place, and time. Mental status is at baseline.      Cranial Nerves: No cranial nerve deficit.      Sensory: No sensory deficit.      Motor: No weakness.      Coordination: Coordination normal.      Gait: Gait normal.   Psychiatric:         Mood and Affect: Mood normal.         Behavior: Behavior normal.         Thought Content: Thought content normal.         Judgment: Judgment normal.         Assessment:       Problem List Items Addressed This Visit    None     Visit Diagnoses     Lumbar sprain, initial encounter    -  Primary    Large breasts              Plan:       Patient will be started with pain management to try and break the cycle of pain and spasm by taking 2 extra-strength Tylenol to get a with 50 to 100 mg of tramadol and 1 diclofenac 75 mg at least twice a day.  There is no need for controlled substance agreement at this time being that this is only been used for short-term.  Prescription monitoring program shows no discrepancies.    Patient is aware of the need to take the diclofenac with meals and not to mix with any other nonsteroidal anti-inflammatory drugs.    The use of a heating pad and or ice packs can also be tried for comfort.  Is important for her to have a very good supportive surface to sleep on and definitely not falling asleep on a recliner or a self all.    She is to contact me with any other concerns.

## 2022-06-06 NOTE — TELEPHONE ENCOUNTER
Specialty Pharmacy - Clinical Reassessment    Specialty Medication Orders Linked to Encounter    Flowsheet Row Most Recent Value   Medication #1 abacavir-dolutegravir-lamivud (TRIUMEQ) 600- mg Tab (Order#[id number], Rx#[id number])        Patient Diagnosis   B20 - Human immunodeficiency virus (HIV) disease    Specialty clinical pharmacist review completed for an annual review of reassessment. Reviewed the following areas: current med list, reports of adverse effects, adherence and progress towards therapeutic goals.    Recommendations: none at this time.    Tasks added this encounter   3/6/2023 - Clinical - Follow Up Assesement (Annual)   Tasks due within next 3 months   6/10/2022 - Refill Call (Auto Added)     Megan Chase, PharmD  [facility]  [address] [place] [id number]  Phone: [phone]  Fax: [phone]

## 2022-06-07 ENCOUNTER — CLINICAL SUPPORT (OUTPATIENT)
Dept: REHABILITATION | Facility: HOSPITAL | Age: 32
End: 2022-06-07
Payer: COMMERCIAL

## 2022-06-07 DIAGNOSIS — M62.89 MUSCLE TIGHTNESS: ICD-10-CM

## 2022-06-07 DIAGNOSIS — R53.1 DECREASED STRENGTH: Primary | ICD-10-CM

## 2022-06-07 DIAGNOSIS — R29.3 POSTURE ABNORMALITY: ICD-10-CM

## 2022-06-07 PROCEDURE — 97140 MANUAL THERAPY 1/> REGIONS: CPT | Mod: PN

## 2022-06-07 NOTE — PROGRESS NOTES
[facility] / [facility]  Physical Therapy Daily Treatment Note     Name: Veronique Vick  Clinic Number: [id number]    Therapy Diagnosis:   Encounter Diagnoses   Name Primary?    Decreased strength Yes    Muscle tightness     Posture abnormality      Physician: Garrett Marino *    Visit Date: 6/7/2022    Physician: Garrett Marino *    Physician Orders: PT Eval and Treat  Medical Diagnosis from Referral: S43.499A (ICD-10-CM) - Sprain of other part of shoulder region, unspecified laterality, initial encounter   Evaluation Date: 4/5/2022  Authorization Period Expiration: 04/20/2022   Plan of Care Expiration: 6/30/2022                Progress Update: 5/5/2022               FOTO: 1 / 3    Visit # / Visits authorized: 5 / 20           Time In: 0810  Time Out: 0850  Total Billable Time: 40 minutes    Precautions: Standard; HIV  Insurance: Payor: BLUE CROSS [employer] BENEFIT / Plan: [employer] [place] / Product Type: Self Funded /     Subjective     Pt reports:  That she has some tightness in her shoulders today.  States that she has been having significant lower back pain the past week.     She was compliant with home exercise program.  Response to previous treatment: no issues   Functional change: n/a    Pain: 2/10  Location: bilateral back      Objective     Millicent received therapeutic exercises to develop strength, endurance, flexibility and posture for 0 minutes including:    UBE  3/3  Upper trap stretch 3x30 secs  Open books   2x10 B   Prone Is, and Ts   2x10 B - NP  Supine SA punches 3#  3x10   Doorway stretch 3x30 secs - NP  Pushups with plus on wall  3x10 - NP    Millicent received the following manual therapy techniques: Soft tissue Mobilization were applied for 40 minutes, including:    Dry Needling Performed by Villa Warner DPT, Cert. DN: The patient was cleared of all contraindications and educated on the risks and effects of dry needling, and post  needling expectations. The patient was agreeable to dry needling treatment. Pt signed consent form pre needling. Dry Needling was performed using 0.30 x 0.50 needles to B upper traps with electrical stimulation applied at 2 Hz for 20 minutes . No adverse affects were noted.  Total needling time = 40 minutes.    Millicent participated in neuromuscular re-education activities to improve: Coordination, Kinesthetic, Sense, Proprioception and Posture for 0 minutes. The following activities were included:    CC Rows 7#  3x10  CC Shoulder extension 7#  3x10  CC SA punches 7#  3x10    Home Exercises Provided and Patient Education Provided     Education provided:   - postural awareness.    Written Home Exercises Provided: yes.  Exercises were reviewed and Millicent was able to demonstrate them prior to the end of the session.  Millicent demonstrated good  understanding of the education provided.     See EMR under Patient Instructions for exercises provided prior visit.    Assessment     Patient tolerated session well, still some tightness in the posterior aspect of the left shoulder near the vertebral border of the scapula. Continue to progress shoulder endurance and stability to decrease cervical tension.     Millicent is progressing well towards her goals.   Pt prognosis is Good.     Pt will continue to benefit from skilled outpatient physical therapy to address the deficits listed in the problem list box on initial evaluation, provide pt/family education and to maximize pt's level of independence in the home and community environment.     Pt's spiritual, cultural and educational needs considered and pt agreeable to plan of care and goals.    Anticipated barriers to physical therapy: None    Goals:     SHORT TERM GOALS: 4 weeks 6/7/2022     1. Recent signs and systems trend is improving in order to progress towards LTG's. ongoing   2. Patient will be independent with HEP in order to further progress and return to maximal function. ongoing    3. Pain rating at Worst: 5/10 in order to progress towards increased independence with activity. ongoing   4. Patient will be able to correct postural deviations in sitting and standing, to decrease pain and promote postural awareness for injury prevention.  ongoing      LONG TERM GOALS: 8 weeks 6/7/2022     1. Patient will return to normal ADL, recreational, and work related activities with less pain and limitation.  ongoing   2. Patient will improve AROM to stated goals in order to return to maximal functional potential.  ongoing   3. Patient will improve Strength to stated goals of appropriate musculature in order to improve functional independence.  ongoing   4. Pain Rating at Best: 1/10 to improve Quality of Life.  ongoing   5. Patient will meet predicted functional outcome (FOTO) score: 75% to increase self-worth & perceived functional ability. ongoing   6. Patient will have met/partially met personal goal of being able to work with no pain ongoing         Plan     Continue POC as previously stated.    Villa Warner, PT

## 2022-06-10 ENCOUNTER — SPECIALTY PHARMACY (OUTPATIENT)
Dept: PHARMACY | Facility: CLINIC | Age: 32
End: 2022-06-10
Payer: COMMERCIAL

## 2022-06-10 ENCOUNTER — PATIENT MESSAGE (OUTPATIENT)
Dept: PHARMACY | Facility: CLINIC | Age: 32
End: 2022-06-10
Payer: COMMERCIAL

## 2022-06-10 NOTE — TELEPHONE ENCOUNTER
Specialty Pharmacy - Refill Coordination    Specialty Medication Orders Linked to Encounter    Flowsheet Row Most Recent Value   Medication #1 abacavir-dolutegravir-lamivud (TRIUMEQ) 600- mg Tab (Order#[id number], Rx#[id number])          Refill Questions - Documented Responses    Flowsheet Row Most Recent Value   Patient Availability and HIPAA Verification    Does patient want to proceed with activity? Yes   HIPAA/medical authority confirmed? Yes   Relationship to patient of person spoken to? Self   Refill Screening Questions    Changes to allergies? No   Changes to medications? No   New conditions since last clinic visit? No   Unplanned office visit, urgent care, ED, or hospital admission in the last 4 weeks? No   How does patient/caregiver feel medication is working? Good   Financial problems or insurance changes? No   How many doses of your specialty medications were missed in the last 4 weeks? 0   Would patient like to speak to a pharmacist? No   When does the patient need to receive the medication? 06/20/22   Refill Delivery Questions    How will the patient receive the medication? Mail   When does the patient need to receive the medication? 06/20/22   Shipping Address Home   Address in [facility] confirmed and updated if neccessary? Yes   Expected Copay ($) 0   Is the patient able to afford the medication copay? Yes   Payment Method zero copay   Days supply of Refill 30   Supplies needed? No supplies needed   Refill activity completed? Yes   Refill activity plan Refill scheduled   Shipment/Pickup Date: 06/14/22          Current Outpatient Medications   Medication Sig    abacavir-dolutegravir-lamivud (TRIUMEQ) 600- mg Tab Take 1 tablet by mouth once daily.    clorazepate (TRANXENE) 7.5 MG Tab Take half to 1 tablet by mouth 3 times a day as needed    diclofenac (VOLTAREN) 75 MG EC tablet Take 1 tablet (75 mg total) by mouth 2 (two) times daily as needed (pain).    ergocalciferol (VITAMIN  D2) 50,000 unit Cap Take 1 capsule (50,000 Units total) by mouth every 7 days.    etonogestreL-ethinyl estradioL (NUVARING) 0.12-0.015 mg/24 hr vaginal ring Place 1 each vaginally every 28 days.    linaCLOtide (LINZESS) 72 mcg Cap capsule Take 1 capsule (72 mcg total) by mouth before breakfast.    metroNIDAZOLE (FLAGYL) 500 MG tablet Take 1 tablet (500 mg total) by mouth every 12 (twelve) hours.    promethazine (PHENERGAN) 12.5 MG Tab Take 12.5 mg by mouth every 4 (four) hours as needed.    sertraline (ZOLOFT) 100 MG tablet TAKE 1.5 TABLETS (150 MG TOTAL) BY MOUTH ONCE DAILY.    sumatriptan (IMITREX) 100 MG tablet 1 tab oral may repeat every 2 hrs.. Max 2 tabs a day    traMADoL (ULTRAM) 50 mg tablet Take 1-2 tablets by mouth 3 times a day for pain   Last reviewed on 6/6/2022 11:06 AM by Jo Graham RN    Review of patient's allergies indicates:   Allergen Reactions    Eggs [egg derived] Nausea Only    Ondansetron Dermatitis    Sulfa (sulfonamide antibiotics) Nausea Only    Sulfamethoxazole-trimethoprim Diarrhea    Last reviewed on  6/6/2022 11:05 AM by Jo Graham      Tasks added this encounter   7/13/2022 - Refill Call (Auto Added)   Tasks due within next 3 months   No tasks due.     Justin Drew, PharmD  [facility]  [address] [place] [id number]  Phone: [phone]  Fax: [phone]

## 2022-06-14 ENCOUNTER — CLINICAL SUPPORT (OUTPATIENT)
Dept: REHABILITATION | Facility: HOSPITAL | Age: 32
End: 2022-06-14
Payer: COMMERCIAL

## 2022-06-14 DIAGNOSIS — M62.89 MUSCLE TIGHTNESS: ICD-10-CM

## 2022-06-14 DIAGNOSIS — R29.3 POSTURE ABNORMALITY: ICD-10-CM

## 2022-06-14 DIAGNOSIS — R53.1 DECREASED STRENGTH: Primary | ICD-10-CM

## 2022-06-14 PROCEDURE — 97140 MANUAL THERAPY 1/> REGIONS: CPT | Mod: PN

## 2022-06-14 NOTE — PROGRESS NOTES
[facility] / [facility]  Physical Therapy Daily Treatment Note     Name: Veronique Vick  Clinic Number: [id number]    Therapy Diagnosis:   Encounter Diagnoses   Name Primary?    Decreased strength Yes    Muscle tightness     Posture abnormality      Physician: Garrett Marino *    Visit Date: 6/14/2022    Physician: Garrett Marino *    Physician Orders: PT Eval and Treat  Medical Diagnosis from Referral: S43.499A (ICD-10-CM) - Sprain of other part of shoulder region, unspecified laterality, initial encounter   Evaluation Date: 4/5/2022  Authorization Period Expiration: 04/20/2022   Plan of Care Expiration: 6/30/2022                Progress Update: 5/5/2022               FOTO: 1 / 3    Visit # / Visits authorized: 5 / 20           Time In: 0815  Time Out: 0855  Total Billable Time: 40 minutes    Precautions: Standard; HIV  Insurance: Payor: BLUE CROSS [employer] BENEFIT / Plan: [employer] [place] / Product Type: Self Funded /     Subjective     Pt reports:  That she has some tightness in her shoulders today.  States that she has been having significant lower back pain the past week.     She was compliant with home exercise program.  Response to previous treatment: no issues   Functional change: n/a    Pain: 2/10  Location: bilateral back      Objective     Millicent received therapeutic exercises to develop strength, endurance, flexibility and posture for 0 minutes including:    UBE  3/3  Upper trap stretch 3x30 secs  Open books   2x10 B   Prone Is, and Ts   2x10 B - NP  Supine SA punches 3#  3x10   Doorway stretch 3x30 secs - NP  Pushups with plus on wall  3x10 - NP    Millicent received the following manual therapy techniques: Soft tissue Mobilization were applied for 40 minutes, including:    Dry Needling Performed by Villa Warner DPT, Cert. DN: The patient was cleared of all contraindications and educated on the risks and effects of dry needling, and post  needling expectations. The patient was agreeable to dry needling treatment. Pt signed consent form pre needling. Dry Needling was performed using 0.30 x 0.50 needles to B upper traps with electrical stimulation applied at 2 Hz for 20 minutes . No adverse affects were noted.  Total needling time = 40 minutes.    Millicent participated in neuromuscular re-education activities to improve: Coordination, Kinesthetic, Sense, Proprioception and Posture for 0 minutes. The following activities were included:    CC Rows 7#  3x10  CC Shoulder extension 7#  3x10  CC SA punches 7#  3x10    Home Exercises Provided and Patient Education Provided     Education provided:   - postural awareness.    Written Home Exercises Provided: yes.  Exercises were reviewed and Millicent was able to demonstrate them prior to the end of the session.  Millicent demonstrated good  understanding of the education provided.     See EMR under Patient Instructions for exercises provided prior visit.    Assessment     Patient tolerated session well, still some tightness in the posterior aspect of the left shoulder near the vertebral border of the scapula. Continue to progress shoulder endurance and stability to decrease cervical tension.     Millicent is progressing well towards her goals.   Pt prognosis is Good.     Pt will continue to benefit from skilled outpatient physical therapy to address the deficits listed in the problem list box on initial evaluation, provide pt/family education and to maximize pt's level of independence in the home and community environment.     Pt's spiritual, cultural and educational needs considered and pt agreeable to plan of care and goals.    Anticipated barriers to physical therapy: None    Goals:     SHORT TERM GOALS: 4 weeks 6/14/2022     1. Recent signs and systems trend is improving in order to progress towards LTG's. met   2. Patient will be independent with HEP in order to further progress and return to maximal function. met    3. Pain rating at Worst: 5/10 in order to progress towards increased independence with activity. met   4. Patient will be able to correct postural deviations in sitting and standing, to decrease pain and promote postural awareness for injury prevention.  met      LONG TERM GOALS: 8 weeks 6/14/2022     1. Patient will return to normal ADL, recreational, and work related activities with less pain and limitation.  ongoing   2. Patient will improve AROM to stated goals in order to return to maximal functional potential.  ongoing   3. Patient will improve Strength to stated goals of appropriate musculature in order to improve functional independence.  ongoing   4. Pain Rating at Best: 1/10 to improve Quality of Life.  ongoing   5. Patient will meet predicted functional outcome (FOTO) score: 75% to increase self-worth & perceived functional ability. ongoing   6. Patient will have met/partially met personal goal of being able to work with no pain ongoing         Plan     Continue POC as previously stated.    Villa Warner, PT

## 2022-06-15 ENCOUNTER — PATIENT MESSAGE (OUTPATIENT)
Dept: INFECTIOUS DISEASES | Facility: CLINIC | Age: 32
End: 2022-06-15

## 2022-07-05 ENCOUNTER — TELEPHONE (OUTPATIENT)
Dept: FAMILY MEDICINE | Facility: CLINIC | Age: 32
End: 2022-07-05
Payer: COMMERCIAL

## 2022-07-05 DIAGNOSIS — R31.9 HEMATURIA, UNSPECIFIED TYPE: ICD-10-CM

## 2022-07-05 DIAGNOSIS — N76.0 ACUTE VAGINITIS: ICD-10-CM

## 2022-07-05 DIAGNOSIS — R30.0 DYSURIA: Primary | ICD-10-CM

## 2022-07-05 LAB
BILIRUB SERPL-MCNC: NEGATIVE MG/DL
BLOOD URINE, POC: NEGATIVE
CLARITY, POC UA: CLEAR
COLOR, POC UA: YELLOW
GLUCOSE UR QL STRIP: NEGATIVE
KETONES UR QL STRIP: NEGATIVE
LEUKOCYTE ESTERASE URINE, POC: ABNORMAL
NITRITE, POC UA: NEGATIVE
PH, POC UA: 6
PROTEIN, POC: NEGATIVE
SPECIFIC GRAVITY, POC UA: >=1.03
UROBILINOGEN, POC UA: 0.2

## 2022-07-05 PROCEDURE — 81002 POCT URINE DIPSTICK WITHOUT MICROSCOPE: ICD-10-PCS | Mod: S$GLB,,, | Performed by: NURSE PRACTITIONER

## 2022-07-05 PROCEDURE — 81002 URINALYSIS NONAUTO W/O SCOPE: CPT | Mod: S$GLB,,, | Performed by: NURSE PRACTITIONER

## 2022-07-05 PROCEDURE — 87086 URINE CULTURE/COLONY COUNT: CPT | Performed by: NURSE PRACTITIONER

## 2022-07-05 RX ORDER — FLUCONAZOLE 150 MG/1
150 TABLET ORAL DAILY
Qty: 1 TABLET | Refills: 0 | Status: SHIPPED | OUTPATIENT
Start: 2022-07-05 | End: 2022-07-06

## 2022-07-05 NOTE — TELEPHONE ENCOUNTER
Patient reports burning with urination, hesitancy with dribbling feeling when attempting to urinate   Vaginal itching/irritation- using otc Monistat

## 2022-07-06 ENCOUNTER — PATIENT MESSAGE (OUTPATIENT)
Dept: FAMILY MEDICINE | Facility: CLINIC | Age: 32
End: 2022-07-06
Payer: COMMERCIAL

## 2022-07-06 ENCOUNTER — LAB VISIT (OUTPATIENT)
Dept: LAB | Facility: CLINIC | Age: 32
End: 2022-07-06
Payer: COMMERCIAL

## 2022-07-06 DIAGNOSIS — R31.9 URINARY TRACT INFECTION WITH HEMATURIA, SITE UNSPECIFIED: Primary | ICD-10-CM

## 2022-07-06 DIAGNOSIS — N39.0 URINARY TRACT INFECTION WITH HEMATURIA, SITE UNSPECIFIED: Primary | ICD-10-CM

## 2022-07-06 DIAGNOSIS — R30.0 DYSURIA: ICD-10-CM

## 2022-07-06 DIAGNOSIS — R31.9 HEMATURIA, UNSPECIFIED TYPE: ICD-10-CM

## 2022-07-06 LAB
BACTERIA #/AREA URNS HPF: ABNORMAL /HPF
BILIRUB UR QL STRIP: NEGATIVE
CLARITY UR: CLEAR
COLOR UR: YELLOW
GLUCOSE UR QL STRIP: NEGATIVE
HGB UR QL STRIP: NEGATIVE
KETONES UR QL STRIP: ABNORMAL
LEUKOCYTE ESTERASE UR QL STRIP: ABNORMAL
MICROSCOPIC COMMENT: ABNORMAL
NITRITE UR QL STRIP: POSITIVE
PH UR STRIP: 6 [PH] (ref 5–8)
PROT UR QL STRIP: NEGATIVE
RBC #/AREA URNS HPF: 20 /HPF (ref 0–4)
SP GR UR STRIP: >=1.03 (ref 1–1.03)
SQUAMOUS #/AREA URNS HPF: 5 /HPF
URN SPEC COLLECT METH UR: ABNORMAL
UROBILINOGEN UR STRIP-ACNC: NEGATIVE EU/DL
WBC #/AREA URNS HPF: 3 /HPF (ref 0–5)

## 2022-07-06 PROCEDURE — 81000 URINALYSIS NONAUTO W/SCOPE: CPT | Performed by: NURSE PRACTITIONER

## 2022-07-06 RX ORDER — AMOXICILLIN AND CLAVULANATE POTASSIUM 875; 125 MG/1; MG/1
1 TABLET, FILM COATED ORAL EVERY 12 HOURS
Qty: 14 TABLET | Refills: 0 | Status: SHIPPED | OUTPATIENT
Start: 2022-07-06 | End: 2022-07-20

## 2022-07-07 LAB
BACTERIA UR CULT: NORMAL
BACTERIA UR CULT: NORMAL

## 2022-07-13 ENCOUNTER — SPECIALTY PHARMACY (OUTPATIENT)
Dept: PHARMACY | Facility: CLINIC | Age: 32
End: 2022-07-13
Payer: COMMERCIAL

## 2022-07-13 ENCOUNTER — PATIENT MESSAGE (OUTPATIENT)
Dept: PHARMACY | Facility: CLINIC | Age: 32
End: 2022-07-13
Payer: COMMERCIAL

## 2022-07-13 NOTE — TELEPHONE ENCOUNTER
Specialty Pharmacy - Refill Coordination    Specialty Medication Orders Linked to Encounter    Flowsheet Row Most Recent Value   Medication #1 abacavir-dolutegravir-lamivud (TRIUMEQ) 600- mg Tab (Order#[id number], Rx#[id number])          Refill Questions - Documented Responses    Flowsheet Row Most Recent Value   Patient Availability and HIPAA Verification    Does patient want to proceed with activity? Yes   HIPAA/medical authority confirmed? Yes   Relationship to patient of person spoken to? Self   Refill Screening Questions    Changes to allergies? No   Changes to medications? No   New conditions since last clinic visit? No   Unplanned office visit, urgent care, ED, or hospital admission in the last 4 weeks? No   How does patient/caregiver feel medication is working? Good   Financial problems or insurance changes? No   How many doses of your specialty medications were missed in the last 4 weeks? 0   Would patient like to speak to a pharmacist? No   When does the patient need to receive the medication? 07/22/22   Refill Delivery Questions    How will the patient receive the medication? Mail   When does the patient need to receive the medication? 07/22/22   Shipping Address Home   Address in [facility] confirmed and updated if neccessary? Yes   Expected Copay ($) 0   Is the patient able to afford the medication copay? Yes   Payment Method zero copay   Days supply of Refill 30   Supplies needed? No supplies needed   Refill activity completed? Yes   Refill activity plan Refill scheduled   Shipment/Pickup Date: 07/18/22          Current Outpatient Medications   Medication Sig    abacavir-dolutegravir-lamivud (TRIUMEQ) 600- mg Tab Take 1 tablet by mouth once daily.    amoxicillin-clavulanate 875-125mg (AUGMENTIN) 875-125 mg per tablet Take 1 tablet by mouth every 12 (twelve) hours.    clorazepate (TRANXENE) 7.5 MG Tab Take half to 1 tablet by mouth 3 times a day as needed    diclofenac (VOLTAREN)  75 MG EC tablet Take 1 tablet (75 mg total) by mouth 2 (two) times daily as needed (pain).    ergocalciferol (VITAMIN D2) 50,000 unit Cap Take 1 capsule (50,000 Units total) by mouth every 7 days.    etonogestreL-ethinyl estradioL (NUVARING) 0.12-0.015 mg/24 hr vaginal ring Place 1 each vaginally every 28 days.    linaCLOtide (LINZESS) 72 mcg Cap capsule Take 1 capsule (72 mcg total) by mouth before breakfast.    metroNIDAZOLE (FLAGYL) 500 MG tablet Take 1 tablet (500 mg total) by mouth every 12 (twelve) hours.    promethazine (PHENERGAN) 12.5 MG Tab Take 12.5 mg by mouth every 4 (four) hours as needed.    sertraline (ZOLOFT) 100 MG tablet TAKE 1.5 TABLETS (150 MG TOTAL) BY MOUTH ONCE DAILY.    sumatriptan (IMITREX) 100 MG tablet 1 tab oral may repeat every 2 hrs.. Max 2 tabs a day    traMADoL (ULTRAM) 50 mg tablet Take 1-2 tablets by mouth 3 times a day for pain   Last reviewed on 6/6/2022 11:06 AM by Jo Graham RN    Review of patient's allergies indicates:   Allergen Reactions    Eggs [egg derived] Nausea Only    Ondansetron Dermatitis    Sulfa (sulfonamide antibiotics) Nausea Only    Sulfamethoxazole-trimethoprim Diarrhea    Last reviewed on  7/6/2022 7:07 PM by Abbey Sheets      Tasks added this encounter   8/14/2022 - Refill Call (Auto Added)   Tasks due within next 3 months   No tasks due.     Solange Mackey [facility]  [address] [facility] [address]  Phone: [phone]  Fax: [phone]

## 2022-07-20 ENCOUNTER — OFFICE VISIT (OUTPATIENT)
Dept: INFECTIOUS DISEASES | Facility: CLINIC | Age: 32
End: 2022-07-20
Payer: COMMERCIAL

## 2022-07-20 VITALS
DIASTOLIC BLOOD PRESSURE: 62 MMHG | BODY MASS INDEX: 29.3 KG/M2 | HEART RATE: 82 BPM | TEMPERATURE: 98 F | WEIGHT: 182.31 LBS | OXYGEN SATURATION: 98 % | SYSTOLIC BLOOD PRESSURE: 122 MMHG | HEIGHT: 66 IN

## 2022-07-20 DIAGNOSIS — Z79.899 ENCOUNTER FOR LONG-TERM (CURRENT) USE OF MEDICATIONS: Primary | ICD-10-CM

## 2022-07-20 DIAGNOSIS — E55.9 VITAMIN D DEFICIENCY: ICD-10-CM

## 2022-07-20 PROCEDURE — 1159F PR MEDICATION LIST DOCUMENTED IN MEDICAL RECORD: ICD-10-PCS | Mod: CPTII,S$GLB,, | Performed by: INTERNAL MEDICINE

## 2022-07-20 PROCEDURE — 3008F PR BODY MASS INDEX (BMI) DOCUMENTED: ICD-10-PCS | Mod: CPTII,S$GLB,, | Performed by: INTERNAL MEDICINE

## 2022-07-20 PROCEDURE — 1159F MED LIST DOCD IN RCRD: CPT | Mod: CPTII,S$GLB,, | Performed by: INTERNAL MEDICINE

## 2022-07-20 PROCEDURE — 1160F RVW MEDS BY RX/DR IN RCRD: CPT | Mod: CPTII,S$GLB,, | Performed by: INTERNAL MEDICINE

## 2022-07-20 PROCEDURE — 3078F DIAST BP <80 MM HG: CPT | Mod: CPTII,S$GLB,, | Performed by: INTERNAL MEDICINE

## 2022-07-20 PROCEDURE — 99214 PR OFFICE/OUTPT VISIT, EST, LEVL IV, 30-39 MIN: ICD-10-PCS | Mod: S$GLB,,, | Performed by: INTERNAL MEDICINE

## 2022-07-20 PROCEDURE — 3078F PR MOST RECENT DIASTOLIC BLOOD PRESSURE < 80 MM HG: ICD-10-PCS | Mod: CPTII,S$GLB,, | Performed by: INTERNAL MEDICINE

## 2022-07-20 PROCEDURE — 99214 OFFICE O/P EST MOD 30 MIN: CPT | Mod: S$GLB,,, | Performed by: INTERNAL MEDICINE

## 2022-07-20 PROCEDURE — 3074F SYST BP LT 130 MM HG: CPT | Mod: CPTII,S$GLB,, | Performed by: INTERNAL MEDICINE

## 2022-07-20 PROCEDURE — 1160F PR REVIEW ALL MEDS BY PRESCRIBER/CLIN PHARMACIST DOCUMENTED: ICD-10-PCS | Mod: CPTII,S$GLB,, | Performed by: INTERNAL MEDICINE

## 2022-07-20 PROCEDURE — 3074F PR MOST RECENT SYSTOLIC BLOOD PRESSURE < 130 MM HG: ICD-10-PCS | Mod: CPTII,S$GLB,, | Performed by: INTERNAL MEDICINE

## 2022-07-20 PROCEDURE — 3008F BODY MASS INDEX DOCD: CPT | Mod: CPTII,S$GLB,, | Performed by: INTERNAL MEDICINE

## 2022-07-20 NOTE — PROGRESS NOTES
Subjective:       Patient ID: Veronique Vick is a 31 y.o. female.    Chief Complaint::  B20    Her Source of exposure learned that he was positive last November and was on treatment but did not reveal his diagnosis until after their last encounter in April 2020.   Her last test was probably during pregnancy for her 2 year old who exhibits no signs of infection .   Recent serologies were reviewed.   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 The Genosure prime was not completed and will be redrawn. We reviewed the [facility] guidelines for treatment in pregnant women and have come to a tentative agreement to use Triumeq. She learned that the source of her infection was on Biktarvy at the time.    12/7/20: tolerating triumeq, as long as she takes it with food. I have not received records from prior GYN, but can see a fair amount in care everywhere. She has 4 month pap repeated this week. She took her flu vaccine. Having anxiety, regarding her ex , requests something for anxiety, non habit forming. She agrees to Buspar.     3/8/21: tolerating triumeq, its a large tablet. Has a significant other, thinking about getting . Yevgeniy. Thinking about having children.  She is interested in him receiving prep.  She is taking herself in her partner.  She understands that she will see a maternal/fetal medicine. Her OB is Dr. Fields and she would deliver [facility]    7/12/21: interim reviewed. Reviewed lab 6/2021 lab.  Doing extremely well.  Having a good weight loss, dieting, exercising    1/13/22: interim reviewed. PAP still abnormal but HPV negative. Reviewed dec lab.  Had zoster and  brief neuralgia left thoracic, under a very stressful circumstance. Resolved with valtrex.  7/20/22: doing well. Had a mild UTI 2 weeks ago. Had some LBP which is resolved.    Current Outpatient Medications:     abacavir-dolutegravir-lamivud (TRIUMEQ) 600- mg Tab, Take 1 tablet by mouth once daily., Disp: 90 tablet, Rfl: 1    clorazepate (TRANXENE) 7.5 MG Tab, Take half to 1 tablet by mouth 3 times a day as needed, Disp: 30 tablet, Rfl: 0    diclofenac (VOLTAREN) 75 MG EC tablet, Take 1 tablet (75 mg total) by mouth 2 (two) times daily as needed (pain)., Disp: 30 tablet, Rfl: 1    ergocalciferol (VITAMIN D2) 50,000 unit Cap, Take 1 capsule (50,000 Units total) by mouth every 7 days., Disp: 12 capsule, Rfl: 3    linaCLOtide (LINZESS) 72 mcg Cap capsule, Take 1 capsule (72 mcg total) by mouth before breakfast., Disp: 30 capsule, Rfl: 3    promethazine (PHENERGAN) 12.5 MG Tab, Take 12.5 mg by mouth every 4 (four) hours as needed., Disp: , Rfl:     sertraline (ZOLOFT) 100 MG tablet, TAKE 1.5 TABLETS (150 MG TOTAL) BY MOUTH ONCE DAILY., Disp: 135 tablet, Rfl: 1    sumatriptan (IMITREX) 100 MG tablet, 1 tab oral may repeat every 2 hrs.. Max 2 tabs a day, Disp: 9 tablet, Rfl: 3    amoxicillin-clavulanate 875-125mg (AUGMENTIN) 875-125 mg per tablet, Take 1 tablet by mouth every 12 (twelve) hours. (Patient not taking: No sig reported), Disp: 14 tablet, Rfl: 0    etonogestreL-ethinyl estradioL (NUVARING) 0.12-0.015 mg/24 hr vaginal ring, Place 1 each vaginally every 28 days., Disp: 1 each, Rfl: 11    metroNIDAZOLE (FLAGYL) 500 MG tablet, Take 1 tablet (500 mg total) by mouth every 12 (twelve) hours. (Patient not taking: Reported on 7/20/2022), Disp: 14 tablet, Rfl: 3    traMADoL (ULTRAM) 50 mg tablet, Take 1-2 tablets by mouth 3 times a day for pain (Patient not taking: Reported on 7/20/2022), Disp: 40 tablet, Rfl: 0  Review of patient's allergies indicates:   Allergen Reactions    Eggs [egg derived] Nausea  Only    Ondansetron Dermatitis    Sulfa (sulfonamide antibiotics) Nausea Only    Sulfamethoxazole-trimethoprim Diarrhea     Past Medical History:   Diagnosis Date    Wrist fracture     left   abnormal PAP    Multiple colposcopies  Past Surgical History:   Procedure Laterality Date    ORIF RADIUS & ULNA FRACTURES Left 2016    WISDOM TOOTH EXTRACTION       Social History     Socioeconomic History    Marital status: Single   Tobacco Use    Smoking status: Never Smoker    Smokeless tobacco: Never Used   Substance and Sexual Activity    Alcohol use: Yes     Alcohol/week: 0.0 standard drinks     Comment: social    Drug use: No    Sexual activity: Yes     Partners: Male     Birth control/protection: I.U.D.     Social Determinants of Health     Financial Resource Strain: Low Risk     Difficulty of Paying Living Expenses: Not hard at all   Food Insecurity: No Food Insecurity    Worried About Running Out of Food in the Last Year: Never true    Ran Out of Food in the Last Year: Never true   Transportation Needs: No Transportation Needs    Lack of Transportation (Medical): No    Lack of Transportation (Non-Medical): No   Physical Activity: Insufficiently Active    Days of Exercise per Week: 4 days    Minutes of Exercise per Session: 20 min   Stress: Stress Concern Present    Feeling of Stress : Rather much   Social Connections: Unknown    Frequency of Communication with Friends and Family: More than three times a week    Frequency of Social Gatherings with Friends and Family: More than three times a week    Active Member of Clubs or Organizations: No    Attends Club or Organization Meetings: Never    Marital Status: Never    Housing Stability: Low Risk     Unable to Pay for Housing in the Last Year: No    Number of Places Lived in the Last Year: 1    Unstable Housing in the Last Year: No     Family History   Problem Relation Age of Onset    Hyperlipidemia Father     Hypertension Father   "      Travel History: [place], [place], [place]ise[place]  Vaccine History: Hep B series (6th grade), Tdap 2/2018, yearly flu vaccine(egg allergy), zostavax 2013, COVID x 3 2021, menactra #1 2022,   Advanced Directive:   Safer Sex:  Sig other, protected  Gyn: (Dr. Fields) UTD, repeat PAP 4/2022, abnormal but improved  Mammogram:  Bone Density:   Colonoscopy:    Review of Systems      Constitutional: No fever, chills, sweats, fatigue, weakness, weight loss. Exercising periodically, chasing a toddler    Eyes: No change in vision, loss of vision,  . UTD eye exam    ENT: No sinus drainage, sore throat, mouth pain, or lesions. UTD dental exam    Cardiovascular: No chest pain, CANNON, palpitations or pedal edema    Respiratory: No shortness of breath, CANNON, cough,       Gastrointestinal: No abdominal pain, nausea, vomiting, diarrhea,   Regular with linzess    Genitourinary: No dysuria, hematuria, , or urethritis    Musculoskeletal: No new pain, joint swelling, or injuries    Integumentary: No new rashes, lesions, or wounds    Neurological: No dizziness, vertigo, unusual headaches, neuropathy, or falls    Psychiatric:  No anxiety, depression, no memory loss,   or substance abuse. Feels good on zoloft    Endocrine: not diabetic.  Thyroid normal    Lymphatic: No lymphadenopathy, blood loss, anemia, or malignancy    Objective:      Blood pressure 122/62, pulse 82, temperature 98.2 °F (36.8 °C), height 5' 6" (1.676 m), weight 82.7 kg (182 lb 4.8 oz), SpO2 98 %. Body mass index is 29.42 kg/m².  Physical Exam    126/96    General: Alert and attentive, cooperative and in no distress    Eyes: Pupils equal, round, reactive to light, anicteric, EOMI    Neck: Supple, non-tender, no thyromegaly or masses    ENT: EAC patent, TM normal, nares patent, no oral lesions, teeth in good condition, no thrush    Cardiovascular: Regular rate and rhythm, no murmurs, rubs, or gallop    Respiratory: Lungs clear without wheezes, no rales, rub or " rhonci    Gastrointestinal:  Soft, nontender, no masses, no guarding, bowel sounds normal    Genitourinary:  No flank tenderness    Vascular: No peripheral edema, phlebitis, pulses normal. Warm and well perfused    Musculoskeletal: Ambulates without difficulty, no acute arthritis, synovitis or myositis. Normal muscle bulk and strength    Integumentary: Skin without rashes, lesions, or wounds    AnusRectum:      Neurological: Normal LOC, cranial nerves, speech, reflexes, normal gait    Psychiatric: Normal mood, speech, demeanor    Lymphatic: No cervical, supraclavicular, axillary  or inguinal lymphadenopathy      Wound:     HIV Table:   Toxo IgG  8/17/20 positive  [id number]  8/17/20 negtive  RPR   6/15/21 Non reactive  Hepatitis A IgG  8/12/20 negative  Hepatitis B sAg 8/12/20 negative  Hepatitis B sAb 8/12/20 positive  Hepatitis B cAb 8/12/20 negative  Hepatitis C Ab  8/12/20 negative  Chl/GC  Vitamin D  8/12/20 31, low  TB gold  8/17/20 negative  Genotype  9/23/20  genotypr plus and integrase all neg  CD4 initial  8/17/20 Over 1000  Initial RNA  8/17/20 1500      Recent Diagnostics: reviewed all of the lab results       Assessment and Plan:           Encounter for long-term (current) use of medications  -     CBC Auto Differential; Future; Expected date: 07/20/2022  -     Comprehensive Metabolic Panel; Future; Expected date: 07/20/2022  -     Lipid Panel; Future; Expected date: 07/20/2022  -     HIV RNA, Quantitative, PCR; Future; Expected date: 07/20/2022  -     Hepatitis C Antibody; Future; Expected date: 07/20/2022  -     RPR; Future; Expected date: 07/20/2022  -     Vitamin D; Future; Expected date: 07/20/2022  -     Quantiferon Gold TB; Future; Expected date: 07/20/2022    Vitamin D deficiency  -     Vitamin D; Future; Expected date: 07/20/2022           Continue triumeq  Continue vitamin D and zinc    Lab now    Flu vax (if you wish) in oct/nov    Follow up 6 months     This note was created using Dragon voice  recognition software that occasionally misinterpreted phrases or words.

## 2022-07-20 NOTE — PATIENT INSTRUCTIONS
Continue triumeq  Continue vitamin D and zinc    Lab now    Flu vax (if you wish) in oct/nov    Follow up 6 months

## 2022-07-21 ENCOUNTER — LAB VISIT (OUTPATIENT)
Dept: LAB | Facility: CLINIC | Age: 32
End: 2022-07-21
Payer: COMMERCIAL

## 2022-07-21 DIAGNOSIS — E55.9 VITAMIN D DEFICIENCY: ICD-10-CM

## 2022-07-21 DIAGNOSIS — Z79.899 ENCOUNTER FOR LONG-TERM (CURRENT) USE OF MEDICATIONS: ICD-10-CM

## 2022-07-21 LAB
25(OH)D3+25(OH)D2 SERPL-MCNC: 33 NG/ML (ref 30–96)
ALBUMIN SERPL BCP-MCNC: 3.7 G/DL (ref 3.5–5.2)
ALP SERPL-CCNC: 47 U/L (ref 55–135)
ALT SERPL W/O P-5'-P-CCNC: 15 U/L (ref 10–44)
ANION GAP SERPL CALC-SCNC: 8 MMOL/L (ref 8–16)
AST SERPL-CCNC: 16 U/L (ref 10–40)
BASOPHILS # BLD AUTO: 0.03 K/UL (ref 0–0.2)
BASOPHILS NFR BLD: 0.6 % (ref 0–1.9)
BILIRUB SERPL-MCNC: 0.4 MG/DL (ref 0.1–1)
BUN SERPL-MCNC: 8 MG/DL (ref 6–20)
CALCIUM SERPL-MCNC: 8.9 MG/DL (ref 8.7–10.5)
CHLORIDE SERPL-SCNC: 107 MMOL/L (ref 95–110)
CHOLEST SERPL-MCNC: 207 MG/DL (ref 120–199)
CHOLEST/HDLC SERPL: 2.9 {RATIO} (ref 2–5)
CO2 SERPL-SCNC: 24 MMOL/L (ref 23–29)
CREAT SERPL-MCNC: 0.8 MG/DL (ref 0.5–1.4)
DIFFERENTIAL METHOD: ABNORMAL
EOSINOPHIL # BLD AUTO: 0.1 K/UL (ref 0–0.5)
EOSINOPHIL NFR BLD: 2.1 % (ref 0–8)
ERYTHROCYTE [DISTWIDTH] IN BLOOD BY AUTOMATED COUNT: 12 % (ref 11.5–14.5)
EST. GFR  (AFRICAN AMERICAN): >60 ML/MIN/1.73 M^2
EST. GFR  (NON AFRICAN AMERICAN): >60 ML/MIN/1.73 M^2
GLUCOSE SERPL-MCNC: 84 MG/DL (ref 70–110)
HCT VFR BLD AUTO: 39.6 % (ref 37–48.5)
HDLC SERPL-MCNC: 71 MG/DL (ref 40–75)
HDLC SERPL: 34.3 % (ref 20–50)
HGB BLD-MCNC: 13.1 G/DL (ref 12–16)
IMM GRANULOCYTES # BLD AUTO: 0.01 K/UL (ref 0–0.04)
IMM GRANULOCYTES NFR BLD AUTO: 0.2 % (ref 0–0.5)
LDLC SERPL CALC-MCNC: 116.2 MG/DL (ref 63–159)
LYMPHOCYTES # BLD AUTO: 2.5 K/UL (ref 1–4.8)
LYMPHOCYTES NFR BLD: 47.3 % (ref 18–48)
MCH RBC QN AUTO: 32.1 PG (ref 27–31)
MCHC RBC AUTO-ENTMCNC: 33.1 G/DL (ref 32–36)
MCV RBC AUTO: 97 FL (ref 82–98)
MONOCYTES # BLD AUTO: 0.3 K/UL (ref 0.3–1)
MONOCYTES NFR BLD: 5.2 % (ref 4–15)
NEUTROPHILS # BLD AUTO: 2.4 K/UL (ref 1.8–7.7)
NEUTROPHILS NFR BLD: 44.6 % (ref 38–73)
NONHDLC SERPL-MCNC: 136 MG/DL
NRBC BLD-RTO: 0 /100 WBC
PLATELET # BLD AUTO: 199 K/UL (ref 150–450)
PMV BLD AUTO: 12 FL (ref 9.2–12.9)
POTASSIUM SERPL-SCNC: 4.1 MMOL/L (ref 3.5–5.1)
PROT SERPL-MCNC: 7.1 G/DL (ref 6–8.4)
RBC # BLD AUTO: 4.08 M/UL (ref 4–5.4)
RPR SER QL: NORMAL
SODIUM SERPL-SCNC: 139 MMOL/L (ref 136–145)
TRIGL SERPL-MCNC: 99 MG/DL (ref 30–150)
WBC # BLD AUTO: 5.35 K/UL (ref 3.9–12.7)

## 2022-07-21 PROCEDURE — 87536 HIV-1 QUANT&REVRSE TRNSCRPJ: CPT | Performed by: INTERNAL MEDICINE

## 2022-07-21 PROCEDURE — 85025 COMPLETE CBC W/AUTO DIFF WBC: CPT | Performed by: INTERNAL MEDICINE

## 2022-07-21 PROCEDURE — 86592 SYPHILIS TEST NON-TREP QUAL: CPT | Performed by: INTERNAL MEDICINE

## 2022-07-21 PROCEDURE — 86803 HEPATITIS C AB TEST: CPT | Performed by: INTERNAL MEDICINE

## 2022-07-21 PROCEDURE — 86480 TB TEST CELL IMMUN MEASURE: CPT | Performed by: INTERNAL MEDICINE

## 2022-07-21 PROCEDURE — 80061 LIPID PANEL: CPT | Performed by: INTERNAL MEDICINE

## 2022-07-21 PROCEDURE — 80053 COMPREHEN METABOLIC PANEL: CPT | Performed by: INTERNAL MEDICINE

## 2022-07-21 PROCEDURE — 82306 VITAMIN D 25 HYDROXY: CPT | Performed by: INTERNAL MEDICINE

## 2022-07-23 LAB — HIV1 RNA # PLAS NAA DL=20: NORMAL COPIES/ML

## 2022-07-25 ENCOUNTER — PATIENT MESSAGE (OUTPATIENT)
Dept: INFECTIOUS DISEASES | Facility: CLINIC | Age: 32
End: 2022-07-25

## 2022-07-25 LAB
GAMMA INTERFERON BACKGROUND BLD IA-ACNC: 0.03 IU/ML
HCV AB SERPL QL IA: NEGATIVE
M TB IFN-G CD4+ BCKGRND COR BLD-ACNC: -0.03 IU/ML
MITOGEN IGNF BCKGRD COR BLD-ACNC: 9.97 IU/ML
TB GOLD PLUS: NEGATIVE
TB2 - NIL: -0.02 IU/ML

## 2022-08-15 ENCOUNTER — SPECIALTY PHARMACY (OUTPATIENT)
Dept: PHARMACY | Facility: CLINIC | Age: 32
End: 2022-08-15
Payer: COMMERCIAL

## 2022-08-15 NOTE — TELEPHONE ENCOUNTER
Specialty Pharmacy - Refill Coordination    Specialty Medication Orders Linked to Encounter    Flowsheet Row Most Recent Value   Medication #1 abacavir-dolutegravir-lamivud (TRIUMEQ) 600- mg Tab (Order#[id number], Rx#[id number])          Refill Questions - Documented Responses    Flowsheet Row Most Recent Value   Patient Availability and HIPAA Verification    Does patient want to proceed with activity? Yes   HIPAA/medical authority confirmed? Yes   Relationship to patient of person spoken to? Self   Refill Screening Questions    Changes to allergies? No   Changes to medications? No   New conditions since last clinic visit? No   Unplanned office visit, urgent care, ED, or hospital admission in the last 4 weeks? No   How does patient/caregiver feel medication is working? Good   Financial problems or insurance changes? No   How many doses of your specialty medications were missed in the last 4 weeks? 0   Would patient like to speak to a pharmacist? No   When does the patient need to receive the medication? 08/18/22   Refill Delivery Questions    How will the patient receive the medication? Delivery Lucinda   When does the patient need to receive the medication? 08/18/22   Shipping Address Home   Address in [facility] confirmed and updated if neccessary? Yes   Expected Copay ($) 0   Is the patient able to afford the medication copay? Yes   Payment Method zero copay   Days supply of Refill 30   Supplies needed? No supplies needed   Refill activity completed? Yes   Refill activity plan Refill scheduled   Shipment/Pickup Date: 08/16/22          Current Outpatient Medications   Medication Sig    abacavir-dolutegravir-lamivud (TRIUMEQ) 600- mg Tab Take 1 tablet by mouth once daily.    clorazepate (TRANXENE) 7.5 MG Tab Take half to 1 tablet by mouth 3 times a day as needed    diclofenac (VOLTAREN) 75 MG EC tablet Take 1 tablet (75 mg total) by mouth 2 (two) times daily as needed (pain).    ergocalciferol  (VITAMIN D2) 50,000 unit Cap Take 1 capsule (50,000 Units total) by mouth every 7 days.    etonogestreL-ethinyl estradioL (NUVARING) 0.12-0.015 mg/24 hr vaginal ring Place 1 each vaginally every 28 days.    linaCLOtide (LINZESS) 72 mcg Cap capsule Take 1 capsule (72 mcg total) by mouth before breakfast.    metroNIDAZOLE (FLAGYL) 500 MG tablet Take 1 tablet (500 mg total) by mouth every 12 (twelve) hours. (Patient not taking: Reported on 7/20/2022)    promethazine (PHENERGAN) 12.5 MG Tab Take 12.5 mg by mouth every 4 (four) hours as needed.    sertraline (ZOLOFT) 100 MG tablet TAKE 1.5 TABLETS BY MOUTH ONCE DAILY.    sumatriptan (IMITREX) 100 MG tablet 1 tab oral may repeat every 2 hrs.. Max 2 tabs a day   Last reviewed on 7/20/2022  9:27 AM by Edelmira Ledesma MD    Review of patient's allergies indicates:   Allergen Reactions    Eggs [egg derived] Nausea Only    Ondansetron Dermatitis    Sulfa (sulfonamide antibiotics) Nausea Only    Sulfamethoxazole-trimethoprim Diarrhea    Last reviewed on  7/20/2022 9:27 AM by Edelmira Ledesma      Tasks added this encounter   9/10/2022 - Refill Call (Auto Added)   Tasks due within next 3 months   No tasks due.     Judith Mackey [facility]  [address] [place] [address]  Phone: [phone]  Fax: [phone]

## 2022-08-16 ENCOUNTER — OFFICE VISIT (OUTPATIENT)
Dept: OBSTETRICS AND GYNECOLOGY | Facility: CLINIC | Age: 32
End: 2022-08-16
Payer: COMMERCIAL

## 2022-08-16 VITALS
DIASTOLIC BLOOD PRESSURE: 62 MMHG | BODY MASS INDEX: 29.58 KG/M2 | WEIGHT: 184.06 LBS | SYSTOLIC BLOOD PRESSURE: 118 MMHG | HEIGHT: 66 IN

## 2022-08-16 DIAGNOSIS — R87.612 LOW GRADE SQUAMOUS INTRAEPITHELIAL LESION ON CYTOLOGIC SMEAR OF CERVIX (LGSIL): Primary | ICD-10-CM

## 2022-08-16 PROCEDURE — 3078F DIAST BP <80 MM HG: CPT | Mod: CPTII,S$GLB,, | Performed by: OBSTETRICS & GYNECOLOGY

## 2022-08-16 PROCEDURE — 99999 PR PBB SHADOW E&M-EST. PATIENT-LVL III: CPT | Mod: PBBFAC,,, | Performed by: OBSTETRICS & GYNECOLOGY

## 2022-08-16 PROCEDURE — 88141 CYTOPATH C/V INTERPRET: CPT | Mod: ,,, | Performed by: PATHOLOGY

## 2022-08-16 PROCEDURE — 88175 CYTOPATH C/V AUTO FLUID REDO: CPT | Performed by: PATHOLOGY

## 2022-08-16 PROCEDURE — 3008F PR BODY MASS INDEX (BMI) DOCUMENTED: ICD-10-PCS | Mod: CPTII,S$GLB,, | Performed by: OBSTETRICS & GYNECOLOGY

## 2022-08-16 PROCEDURE — 99999 PR PBB SHADOW E&M-EST. PATIENT-LVL III: ICD-10-PCS | Mod: PBBFAC,,, | Performed by: OBSTETRICS & GYNECOLOGY

## 2022-08-16 PROCEDURE — 3074F SYST BP LT 130 MM HG: CPT | Mod: CPTII,S$GLB,, | Performed by: OBSTETRICS & GYNECOLOGY

## 2022-08-16 PROCEDURE — 3078F PR MOST RECENT DIASTOLIC BLOOD PRESSURE < 80 MM HG: ICD-10-PCS | Mod: CPTII,S$GLB,, | Performed by: OBSTETRICS & GYNECOLOGY

## 2022-08-16 PROCEDURE — 99213 PR OFFICE/OUTPT VISIT, EST, LEVL III, 20-29 MIN: ICD-10-PCS | Mod: S$GLB,,, | Performed by: OBSTETRICS & GYNECOLOGY

## 2022-08-16 PROCEDURE — 88141 PR  CYTOPATH CERV/VAG INTERPRET: ICD-10-PCS | Mod: ,,, | Performed by: PATHOLOGY

## 2022-08-16 PROCEDURE — 3074F PR MOST RECENT SYSTOLIC BLOOD PRESSURE < 130 MM HG: ICD-10-PCS | Mod: CPTII,S$GLB,, | Performed by: OBSTETRICS & GYNECOLOGY

## 2022-08-16 PROCEDURE — 87624 HPV HI-RISK TYP POOLED RSLT: CPT | Performed by: OBSTETRICS & GYNECOLOGY

## 2022-08-16 PROCEDURE — 99213 OFFICE O/P EST LOW 20 MIN: CPT | Mod: S$GLB,,, | Performed by: OBSTETRICS & GYNECOLOGY

## 2022-08-16 PROCEDURE — 3008F BODY MASS INDEX DOCD: CPT | Mod: CPTII,S$GLB,, | Performed by: OBSTETRICS & GYNECOLOGY

## 2022-08-16 NOTE — PROGRESS NOTES
"Chief Complaint   Patient presents with    repeat pap       History of Present Illness   31 y.o.  female patient presents today for dx PAP#6 today :  PAP= LGSIL July 2020, neg colpo  PAP #1 12/2020 = WNL (new dx HIV)  PAP #2= 4/19/21= ASCUS  PAP #3= 8/2021= ASCUS w pos HPV  PAP#4= 12/14/21= lgsil  Liletta IUD placed 6/2020  PAP #5= ASCUS w neg HPV  newly diagnosed HIV since last visit- on meds. / low viral count.      Past medical and surgical history reviewed.   I have reviewed the patient's medical history in detail and updated the computerized patient record.    Review of patient's allergies indicates:   Allergen Reactions    Eggs [egg derived] Nausea Only    Ondansetron Dermatitis    Sulfa (sulfonamide antibiotics) Nausea Only    Sulfamethoxazole-trimethoprim Diarrhea         Review of Systems - Negative except HPI  GEN ROS: negative for - chills or fever  Breast ROS: negative for breast lumps  Genito-Urinary ROS: no dysuria, trouble voiding, or hematuria      Physical Examination:  /62   Ht 5' 6" (1.676 m)   Wt 83.5 kg (184 lb 1.4 oz)   LMP 07/27/2022   BMI 29.71 kg/m²    Constitutional: She appears alert and responsive. She appears well-developed, well-groomed, and well-nourished. No distress. Thin  HENT:   Head: Normocephalic and atraumatic.   Eyes: Conjunctivae and EOM are normal. No scleral icterus.   Neck: Symmetrical. Normal range of motion. Neck supple. No tracheal deviation present. THYROID: without masses or tenderness.  Cardiovascular: Normal rate, no rhythm abnormality noted. Extremities without swelling or edema, warm.    Pulmonary/Chest: Normal respiratory Effort. No distress or retractions. She exhibits no tenderness.  Abdominal: Soft. She exhibits no distension, hernias or masses. There is no tenderness. No enlargement of liver edge or spleen.  There is no rebound and no guarding.   Genitourinary:    External rectal exam shows no thrombosed external hemorrhoids, no " lesions.     Pelvic exam was performed with patient supine.   No labial fusion, and symmetrical.    There is no rash, lesion or injury on the right labia.   There is no rash, lesion or injury on the left labia.   No bleeding and no signs of injury around the vaginal introitus, urethral meatus is normal size and without prolapse or lesions, urethra well supported. The cervix is visualized with no discharge   No vaginal discharge found.    No significant Cystocele, Enterocele or rectocele, and cervix and uterus well supported.  Musculoskeletal: Normal range of motion.   Lymphadenopathy: No inguinal adenopathy present.   Neurological: She is alert and oriented to person, place, and time. Coordination normal.   Skin: Skin is warm and dry. She is not diaphoretic. No rashes, lesions or ulcers.   Psychiatric: She has a normal mood and affect, oriented to person, place, and time.          Assessment:  PAP= LGSIL July 2020, neg colpo  PAP #1 12/2020 = WNL (new dx HIV)  PAP #2= 4/19/21= ASCUS  PAP #3= 8/2021= ASCUS w pos HPV  PAP#4= 12/14/21= lgsil  Liletta IUD placed 6/2020  PAP #5= ASCUS w neg HPV  New HIV diagnosis - counseled      Plan:  PAP #6 today, then q 4 months  Follow up with ID as scheduled.   Patient informed will be contacted with results within 2 weeks. Encouraged to please call back or email if she has not heard from us by then.

## 2022-08-23 LAB
FINAL PATHOLOGIC DIAGNOSIS: ABNORMAL
Lab: ABNORMAL

## 2022-09-07 ENCOUNTER — CLINICAL SUPPORT (OUTPATIENT)
Dept: FAMILY MEDICINE | Facility: CLINIC | Age: 32
End: 2022-09-07
Payer: COMMERCIAL

## 2022-09-07 DIAGNOSIS — Z11.52 ENCOUNTER FOR SCREENING FOR SEVERE ACUTE RESPIRATORY SYNDROME CORONAVIRUS 2 (SARS-COV-2) INFECTION: ICD-10-CM

## 2022-09-07 DIAGNOSIS — R05.9 COUGH: Primary | ICD-10-CM

## 2022-09-07 LAB
CTP QC/QA: YES
SARS-COV-2 RDRP RESP QL NAA+PROBE: NEGATIVE

## 2022-09-07 PROCEDURE — U0002: ICD-10-PCS | Mod: QW,S$GLB,, | Performed by: STUDENT IN AN ORGANIZED HEALTH CARE EDUCATION/TRAINING PROGRAM

## 2022-09-07 PROCEDURE — U0002 COVID-19 LAB TEST NON-CDC: HCPCS | Mod: QW,S$GLB,, | Performed by: STUDENT IN AN ORGANIZED HEALTH CARE EDUCATION/TRAINING PROGRAM

## 2022-09-07 NOTE — PROGRESS NOTES
Verified patient name and .   Swabbed patient for the ordered tests, which patient tolerated well.   Provider and patient made aware of results.

## 2022-09-12 ENCOUNTER — SPECIALTY PHARMACY (OUTPATIENT)
Dept: PHARMACY | Facility: CLINIC | Age: 32
End: 2022-09-12
Payer: COMMERCIAL

## 2022-09-12 NOTE — TELEPHONE ENCOUNTER
Specialty Pharmacy - Refill Coordination    Specialty Medication Orders Linked to Encounter      Flowsheet Row Most Recent Value   Medication #1 abacavir-dolutegravir-lamivud (TRIUMEQ) 600- mg Tab (Order#[id number], Rx#[id number])            Refill Questions - Documented Responses      Flowsheet Row Most Recent Value   Patient Availability and HIPAA Verification    Does patient want to proceed with activity? Yes   HIPAA/medical authority confirmed? Yes   Relationship to patient of person spoken to? Self   Refill Screening Questions    Changes to allergies? No   Changes to medications? No   New conditions since last clinic visit? No   Unplanned office visit, urgent care, ED, or hospital admission in the last 4 weeks? No   How does patient/caregiver feel medication is working? Good   Financial problems or insurance changes? No   How many doses of your specialty medications were missed in the last 4 weeks? 0   Would patient like to speak to a pharmacist? No   When does the patient need to receive the medication? 09/16/22   Refill Delivery Questions    How will the patient receive the medication? Delivery Lucinda   When does the patient need to receive the medication? 09/16/22   Shipping Address Home   Address in [facility] confirmed and updated if neccessary? Yes   Expected Copay ($) 0   Is the patient able to afford the medication copay? Yes   Payment Method zero copay   Days supply of Refill 30   Supplies needed? No supplies needed   Refill activity completed? Yes   Refill activity plan Refill scheduled   Shipment/Pickup Date: 09/13/22            Current Outpatient Medications   Medication Sig    abacavir-dolutegravir-lamivud (TRIUMEQ) 600- mg Tab Take 1 tablet by mouth once daily.    clorazepate (TRANXENE) 7.5 MG Tab Take half to 1 tablet by mouth 3 times a day as needed    diclofenac (VOLTAREN) 75 MG EC tablet Take 1 tablet (75 mg total) by mouth 2 (two) times daily as needed (pain).     ergocalciferol (VITAMIN D2) 50,000 unit Cap Take 1 capsule (50,000 Units total) by mouth every 7 days.    etonogestreL-ethinyl estradioL (NUVARING) 0.12-0.015 mg/24 hr vaginal ring Place 1 each vaginally every 28 days.    linaCLOtide (LINZESS) 72 mcg Cap capsule Take 1 capsule (72 mcg total) by mouth before breakfast.    metroNIDAZOLE (FLAGYL) 500 MG tablet Take 1 tablet (500 mg total) by mouth every 12 (twelve) hours. (Patient not taking: No sig reported)    promethazine (PHENERGAN) 12.5 MG Tab Take 12.5 mg by mouth every 4 (four) hours as needed.    sertraline (ZOLOFT) 100 MG tablet TAKE 1.5 TABLETS BY MOUTH ONCE DAILY.    sumatriptan (IMITREX) 100 MG tablet 1 tab oral may repeat every 2 hrs.. Max 2 tabs a day   Last reviewed on 7/20/2022  9:27 AM by Edelmira Ledesma MD    Review of patient's allergies indicates:   Allergen Reactions    Eggs [egg derived] Nausea Only    Ondansetron Dermatitis    Sulfa (sulfonamide antibiotics) Nausea Only    Sulfamethoxazole-trimethoprim Diarrhea    Last reviewed on  8/16/2022 8:33 AM by Abbey Echevarria      Tasks added this encounter   10/9/2022 - Refill Call (Auto Added)   Tasks due within next 3 months   No tasks due.     Judith Mackey [facility] - [facility]  [address] [place] [id number]  Phone: [phone]  Fax: [phone]

## 2022-10-04 ENCOUNTER — OFFICE VISIT (OUTPATIENT)
Dept: FAMILY MEDICINE | Facility: CLINIC | Age: 32
End: 2022-10-04
Payer: COMMERCIAL

## 2022-10-04 VITALS
HEIGHT: 66 IN | OXYGEN SATURATION: 97 % | SYSTOLIC BLOOD PRESSURE: 112 MMHG | WEIGHT: 187 LBS | HEART RATE: 81 BPM | RESPIRATION RATE: 16 BRPM | BODY MASS INDEX: 30.05 KG/M2 | DIASTOLIC BLOOD PRESSURE: 76 MMHG

## 2022-10-04 DIAGNOSIS — D64.9 ANEMIA, UNSPECIFIED TYPE: ICD-10-CM

## 2022-10-04 DIAGNOSIS — Z00.00 ROUTINE GENERAL MEDICAL EXAMINATION AT A HEALTH CARE FACILITY: Primary | ICD-10-CM

## 2022-10-04 DIAGNOSIS — E78.00 HYPERCHOLESTEROLEMIA: ICD-10-CM

## 2022-10-04 DIAGNOSIS — Z79.899 ENCOUNTER FOR LONG-TERM (CURRENT) USE OF OTHER MEDICATIONS: ICD-10-CM

## 2022-10-04 DIAGNOSIS — E03.9 HYPOTHYROIDISM, UNSPECIFIED TYPE: ICD-10-CM

## 2022-10-04 LAB
ALBUMIN SERPL BCP-MCNC: 3.9 G/DL (ref 3.5–5.2)
ALP SERPL-CCNC: 56 U/L (ref 55–135)
ALT SERPL W/O P-5'-P-CCNC: 23 U/L (ref 10–44)
ANION GAP SERPL CALC-SCNC: 9 MMOL/L (ref 8–16)
AST SERPL-CCNC: 19 U/L (ref 10–40)
BASOPHILS # BLD AUTO: 0.05 K/UL (ref 0–0.2)
BASOPHILS NFR BLD: 0.8 % (ref 0–1.9)
BILIRUB SERPL-MCNC: 0.4 MG/DL (ref 0.1–1)
BUN SERPL-MCNC: 9 MG/DL (ref 6–20)
CALCIUM SERPL-MCNC: 9 MG/DL (ref 8.7–10.5)
CHLORIDE SERPL-SCNC: 108 MMOL/L (ref 95–110)
CHOLEST SERPL-MCNC: 208 MG/DL (ref 120–199)
CHOLEST/HDLC SERPL: 3.3 {RATIO} (ref 2–5)
CO2 SERPL-SCNC: 22 MMOL/L (ref 23–29)
CREAT SERPL-MCNC: 0.8 MG/DL (ref 0.5–1.4)
DIFFERENTIAL METHOD: ABNORMAL
EOSINOPHIL # BLD AUTO: 0.2 K/UL (ref 0–0.5)
EOSINOPHIL NFR BLD: 3.9 % (ref 0–8)
ERYTHROCYTE [DISTWIDTH] IN BLOOD BY AUTOMATED COUNT: 12.2 % (ref 11.5–14.5)
EST. GFR  (NO RACE VARIABLE): >60 ML/MIN/1.73 M^2
GLUCOSE SERPL-MCNC: 83 MG/DL (ref 70–110)
HCT VFR BLD AUTO: 37.9 % (ref 37–48.5)
HDLC SERPL-MCNC: 64 MG/DL (ref 40–75)
HDLC SERPL: 30.8 % (ref 20–50)
HGB BLD-MCNC: 12.7 G/DL (ref 12–16)
IMM GRANULOCYTES # BLD AUTO: 0.02 K/UL (ref 0–0.04)
IMM GRANULOCYTES NFR BLD AUTO: 0.3 % (ref 0–0.5)
LDLC SERPL CALC-MCNC: 128.8 MG/DL (ref 63–159)
LYMPHOCYTES # BLD AUTO: 2.7 K/UL (ref 1–4.8)
LYMPHOCYTES NFR BLD: 43.6 % (ref 18–48)
MCH RBC QN AUTO: 32.2 PG (ref 27–31)
MCHC RBC AUTO-ENTMCNC: 33.5 G/DL (ref 32–36)
MCV RBC AUTO: 96 FL (ref 82–98)
MONOCYTES # BLD AUTO: 0.4 K/UL (ref 0.3–1)
MONOCYTES NFR BLD: 7.1 % (ref 4–15)
NEUTROPHILS # BLD AUTO: 2.8 K/UL (ref 1.8–7.7)
NEUTROPHILS NFR BLD: 44.3 % (ref 38–73)
NONHDLC SERPL-MCNC: 144 MG/DL
NRBC BLD-RTO: 0 /100 WBC
PLATELET # BLD AUTO: 198 K/UL (ref 150–450)
PMV BLD AUTO: 11.9 FL (ref 9.2–12.9)
POTASSIUM SERPL-SCNC: 3.8 MMOL/L (ref 3.5–5.1)
PROT SERPL-MCNC: 7 G/DL (ref 6–8.4)
RBC # BLD AUTO: 3.95 M/UL (ref 4–5.4)
SODIUM SERPL-SCNC: 139 MMOL/L (ref 136–145)
T4 FREE SERPL-MCNC: 0.84 NG/DL (ref 0.71–1.51)
TRIGL SERPL-MCNC: 76 MG/DL (ref 30–150)
TSH SERPL DL<=0.005 MIU/L-ACNC: 0.31 UIU/ML (ref 0.4–4)
WBC # BLD AUTO: 6.22 K/UL (ref 3.9–12.7)

## 2022-10-04 PROCEDURE — 3074F SYST BP LT 130 MM HG: CPT | Mod: S$GLB,,, | Performed by: INTERNAL MEDICINE

## 2022-10-04 PROCEDURE — 1159F MED LIST DOCD IN RCRD: CPT | Mod: S$GLB,,, | Performed by: INTERNAL MEDICINE

## 2022-10-04 PROCEDURE — 80061 LIPID PANEL: CPT | Performed by: INTERNAL MEDICINE

## 2022-10-04 PROCEDURE — 3074F PR MOST RECENT SYSTOLIC BLOOD PRESSURE < 130 MM HG: ICD-10-PCS | Mod: S$GLB,,, | Performed by: INTERNAL MEDICINE

## 2022-10-04 PROCEDURE — 99395 PR PREVENTIVE VISIT,EST,18-39: ICD-10-PCS | Mod: S$GLB,,, | Performed by: INTERNAL MEDICINE

## 2022-10-04 PROCEDURE — 3078F PR MOST RECENT DIASTOLIC BLOOD PRESSURE < 80 MM HG: ICD-10-PCS | Mod: S$GLB,,, | Performed by: INTERNAL MEDICINE

## 2022-10-04 PROCEDURE — 85025 COMPLETE CBC W/AUTO DIFF WBC: CPT | Performed by: INTERNAL MEDICINE

## 2022-10-04 PROCEDURE — 3008F BODY MASS INDEX DOCD: CPT | Mod: S$GLB,,, | Performed by: INTERNAL MEDICINE

## 2022-10-04 PROCEDURE — 80053 COMPREHEN METABOLIC PANEL: CPT | Performed by: INTERNAL MEDICINE

## 2022-10-04 PROCEDURE — 3008F PR BODY MASS INDEX (BMI) DOCUMENTED: ICD-10-PCS | Mod: S$GLB,,, | Performed by: INTERNAL MEDICINE

## 2022-10-04 PROCEDURE — 84443 ASSAY THYROID STIM HORMONE: CPT | Performed by: INTERNAL MEDICINE

## 2022-10-04 PROCEDURE — 3078F DIAST BP <80 MM HG: CPT | Mod: S$GLB,,, | Performed by: INTERNAL MEDICINE

## 2022-10-04 PROCEDURE — 99395 PREV VISIT EST AGE 18-39: CPT | Mod: S$GLB,,, | Performed by: INTERNAL MEDICINE

## 2022-10-04 PROCEDURE — 84439 ASSAY OF FREE THYROXINE: CPT | Performed by: INTERNAL MEDICINE

## 2022-10-04 PROCEDURE — 1159F PR MEDICATION LIST DOCUMENTED IN MEDICAL RECORD: ICD-10-PCS | Mod: S$GLB,,, | Performed by: INTERNAL MEDICINE

## 2022-10-04 NOTE — PROGRESS NOTES
SUBJECTIVE:    Patient ID: Veronique Vick is a 32 y.o. female who presents for annual physical.     Patient presents today for her yearly wellness examination.  Fasting laboratory evaluation will be obtained today.  Lab work performed under the care of her infectious disease specialist back on June 21, 2022 has been reviewed and there were no gross abnormalities that suggested and urgency on repeat.    Regarding care gaps according to her age all are addressed.  Will be receiving a seasonal influenza vaccination for her employer but she has to wait because she has to have the 1 that is egg free due to her severe allergy to the a containing 1.  COVID vaccination she is up to 3 doses of same and is considering whether she is going to be receiving the bivalent later in the year.  All this is to be addressed by her infectious disease specialist.      Review of system is otherwise unremarkable as it pertains to cardiorespiratory and central nervous system complaints.      At this age she is still not to be having mammography or colonoscopy for screening unless significant abnormalities are identified.    She is up-to-date with her eye examination having been examined by Dr. Amaya earlier this year.    Patient has already had her hepatitis-C screening on gets follow-up TB gold, RPR, and routine gynecological screens.    This lab will be drawn at this facility today and will have most of the reports available in the next 6-8 hours.  If there is anything abnormal re-contacted accordingly.  She also has access to same by the portal.      Regarding her concern over her weight gain she is now at a BMI of 30.2 kilograms/meter sq and a significantly restricted calorie intake has been recommended.      Clinical Support on 09/07/2022   Component Date Value Ref Range Status    POC Rapid COVID 09/07/2022 Negative  Negative Final     Acceptable 09/07/2022 Yes   Final   Office Visit on 08/16/2022   Component Date  Value Ref Range Status    Final Pathologic Diagnosis 08/16/2022  (A)   Final                    Value:Specimen Adequacy  Satisfactory for interpretation. Endocervical component is present.  Hardin Category  Epithelial cell abnormalities.  Final Diagnostic Interpretation  Atypical squamous cells of undetermined significance (HPV testing will  automatically occur on ThinPrep specimens with this diagnosis).      Disclaimer 08/16/2022  (A)   Final                    Value:The Pap smear is a screening test that aids in the detection of cervical  cancer and cancer precursors. Both false positive and false negative results  can occur. The test should be used at regular intervals, and positive results  should be confirmed before definitive therapy.  This liquid based specimen is processed using the  or  Thin PrepPAP  System. This specimen has been analyzed by the ThinPrep Imaging System  ([employer]), an automated imaging and review system which assists  the laboratory in evaluating cells on ThinPrep PAP tests. Following automated  imaging, selected fields from every slide are reviewed by a cytotechnologist  and/or pathologist.  Screening was performed at [facility], [address] [place] [id number].      HPV other High Risk types, PCR 08/24/2022 Negative  Negative Final    HPV High Risk type 16, PCR 08/24/2022 Negative  Negative Final    HPV High Risk type 18, PCR 08/24/2022 Negative  Negative Final   Lab Visit on 07/21/2022   Component Date Value Ref Range Status    WBC 07/21/2022 5.35  3.90 - 12.70 K/uL Final    RBC 07/21/2022 4.08  4.00 - 5.40 M/uL Final    Hemoglobin 07/21/2022 13.1  12.0 - 16.0 g/dL Final    Hematocrit 07/21/2022 39.6  37.0 - 48.5 % Final    MCV 07/21/2022 97  82 - 98 fL Final    MCH 07/21/2022 32.1 (H)  27.0 - 31.0 pg Final    MCHC 07/21/2022 33.1  32.0 - 36.0 g/dL Final    RDW 07/21/2022 12.0  11.5 - 14.5 % Final    Platelets  07/21/2022 199  150 - 450 K/uL Final    MPV 07/21/2022 12.0  9.2 - 12.9 fL Final    Immature Granulocytes 07/21/2022 0.2  0.0 - 0.5 % Final    Gran # (ANC) 07/21/2022 2.4  1.8 - 7.7 K/uL Final    Immature Grans (Abs) 07/21/2022 0.01  0.00 - 0.04 K/uL Final    Lymph # 07/21/2022 2.5  1.0 - 4.8 K/uL Final    Mono # 07/21/2022 0.3  0.3 - 1.0 K/uL Final    Eos # 07/21/2022 0.1  0.0 - 0.5 K/uL Final    Baso # 07/21/2022 0.03  0.00 - 0.20 K/uL Final    nRBC 07/21/2022 0  0 /100 WBC Final    Gran % 07/21/2022 44.6  38.0 - 73.0 % Final    Lymph % 07/21/2022 47.3  18.0 - 48.0 % Final    Mono % 07/21/2022 5.2  4.0 - 15.0 % Final    Eosinophil % 07/21/2022 2.1  0.0 - 8.0 % Final    Basophil % 07/21/2022 0.6  0.0 - 1.9 % Final    Differential Method 07/21/2022 Automated   Final    Sodium 07/21/2022 139  136 - 145 mmol/L Final    Potassium 07/21/2022 4.1  3.5 - 5.1 mmol/L Final    Chloride 07/21/2022 107  95 - 110 mmol/L Final    CO2 07/21/2022 24  23 - 29 mmol/L Final    Glucose 07/21/2022 84  70 - 110 mg/dL Final    BUN 07/21/2022 8  6 - 20 mg/dL Final    Creatinine 07/21/2022 0.8  0.5 - 1.4 mg/dL Final    Calcium 07/21/2022 8.9  8.7 - 10.5 mg/dL Final    Total Protein 07/21/2022 7.1  6.0 - 8.4 g/dL Final    Albumin 07/21/2022 3.7  3.5 - 5.2 g/dL Final    Total Bilirubin 07/21/2022 0.4  0.1 - 1.0 mg/dL Final    Alkaline Phosphatase 07/21/2022 47 (L)  55 - 135 U/L Final    AST 07/21/2022 16  10 - 40 U/L Final    ALT 07/21/2022 15  10 - 44 U/L Final    Anion Gap 07/21/2022 8  8 - 16 mmol/L Final    eGFR if African American 07/21/2022 >60  >60 mL/min/1.73 m^2 Final    eGFR if non African American 07/21/2022 >60  >60 mL/min/1.73 m^2 Final    Cholesterol 07/21/2022 207 (H)  120 - 199 mg/dL Final    Triglycerides 07/21/2022 99  30 - 150 mg/dL Final    HDL 07/21/2022 71  40 - 75 mg/dL Final    LDL Cholesterol 07/21/2022 116.2  63.0 - 159.0 mg/dL Final    HDL/Cholesterol Ratio 07/21/2022 34.3  20.0 - 50.0 % Final    Total  Cholesterol/HDL Ratio 07/21/2022 2.9  2.0 - 5.0 Final    Non-HDL Cholesterol 07/21/2022 136  mg/dL Final    HIV-1 RNA, Quantitative 07/21/2022 Undetected  Undetected copies/mL Final    Hepatitis C Ab 07/21/2022 Negative  Negative Final    RPR 07/21/2022 Non-reactive  Non-reactive Final    Vit D, 25-Hydroxy 07/21/2022 33  30 - 96 ng/mL Final    NIL 07/21/2022 0.69972  IU/mL Final    TB1 - Nil 07/21/2022 -0.027  IU/mL Final    TB2 - Nil 07/21/2022 -0.022  IU/mL Final    Mitogen - Nil 07/21/2022 9.970  IU/mL Final    TB Gold Plus 07/21/2022 Negative  Negative Final   Lab Visit on 07/06/2022   Component Date Value Ref Range Status    Specimen UA 07/05/2022 Urine, Unspecified   Final    Color, UA 07/05/2022 Yellow  Yellow, Straw, Melody Final    Appearance, UA 07/05/2022 Clear  Clear Final    pH, UA 07/05/2022 6.0  5.0 - 8.0 Final    Specific Gravity, UA 07/05/2022 >=1.030 (A)  1.005 - 1.030 Final    Protein, UA 07/05/2022 Negative  Negative Final    Glucose, UA 07/05/2022 Negative  Negative Final    Ketones, UA 07/05/2022 Trace (A)  Negative Final    Bilirubin (UA) 07/05/2022 Negative  Negative Final    Occult Blood UA 07/05/2022 Negative  Negative Final    Nitrite, UA 07/05/2022 Positive (A)  Negative Final    Urobilinogen, UA 07/05/2022 Negative  <2.0 EU/dL Final    Leukocytes, UA 07/05/2022 1+ (A)  Negative Final    Urine Culture, Routine 07/05/2022 Multiple organisms isolated. None in predominance.  Repeat if   Final    Urine Culture, Routine 07/05/2022 clinically necessary.   Final    RBC, UA 07/05/2022 20 (H)  0 - 4 /hpf Final    WBC, UA 07/05/2022 3  0 - 5 /hpf Final    Bacteria 07/05/2022 Rare  None-Occ /hpf Final    Squam Epithel, UA 07/05/2022 5  /hpf Final    Microscopic Comment 07/05/2022 SEE COMMENT   Final   Telephone on 07/05/2022   Component Date Value Ref Range Status    Color, UA 07/05/2022 Yellow   Final    pH, UA 07/05/2022 6.0   Final    WBC, UA 07/05/2022 small   Final    Nitrite, UA 07/05/2022  negative   Final    Protein, POC 07/05/2022 negative   Final    Glucose, UA 07/05/2022 negative   Final    Ketones, UA 07/05/2022 negative   Final    Urobilinogen, UA 07/05/2022 0.2   Final    Bilirubin, POC 07/05/2022 negative   Final    Blood, UA 07/05/2022 negative   Final    Clarity, UA 07/05/2022 Clear   Final    Spec Grav UA 07/05/2022 >=1.030   Final   Clinical Support on 05/16/2022   Component Date Value Ref Range Status    SARS-CoV2 (COVID-19) Qualitative P* 05/16/2022 Not Detected  Not Detected Final   Office Visit on 04/12/2022   Component Date Value Ref Range Status    Cytology ThinPrep Pap Source 04/12/2022 Cervix   Final    Cytology ThinPrep Pap Report Status 04/12/2022 DNR   Final    Cytology Thinprep PAP Clinical His* 04/12/2022 Routine exam   Corrected    Cytology ThinPrep Pap LMP 04/12/2022 none   Final    Cytology ThinPrep Previous PAP 04/12/2022 Unknown   Final    Cytology ThinPrep Previous Biopsy 04/12/2022 No   Final    Cytology ThinPrep PAP Adequacy 04/12/2022 SEE BELOW   Final    Cytology ThinPrep PAP General Johnna* 04/12/2022 EPITHELIAL CELL ABNORMALITY (A)   Final    Cytology ThinPrep PAP Interpretati* 04/12/2022 SEE BELOW (A)   Final    Cytology ThinPrep PAP Comment 04/12/2022 SEE BELOW   Final    Cytotechnologist 04/12/2022 SEE BELOW   Final    Review Cytotechnologist 04/12/2022 DNR   Final    Pathologist 04/12/2022 SEE BELOW   Final    Cytology ThinPrep PAP Infection 04/12/2022 DNR   Final    Cytology Thin Prep Pap Explanation 04/12/2022 SEE BELOW   Final    HPV DNA High Risk 04/12/2022 Not Detected  NOT DETECTED Final       Review of patient's allergies indicates:   Allergen Reactions    Eggs [egg derived] Nausea Only    Ondansetron Dermatitis    Sulfa (sulfonamide antibiotics) Nausea Only    Sulfamethoxazole-trimethoprim Diarrhea     Current Outpatient Medications on File Prior to Visit   Medication Sig Dispense Refill    abacavir-dolutegravir-lamivud (TRIUMEQ) 600- mg Tab Take 1  tablet by mouth once daily. 90 tablet 1    clorazepate (TRANXENE) 7.5 MG Tab Take half to 1 tablet by mouth 3 times a day as needed 30 tablet 0    diclofenac (VOLTAREN) 75 MG EC tablet Take 1 tablet (75 mg total) by mouth 2 (two) times daily as needed (pain). 30 tablet 1    ergocalciferol (VITAMIN D2) 50,000 unit Cap Take 1 capsule (50,000 Units total) by mouth every 7 days. 12 capsule 3    linaCLOtide (LINZESS) 72 mcg Cap capsule Take 1 capsule (72 mcg total) by mouth before breakfast. 30 capsule 3    promethazine (PHENERGAN) 12.5 MG Tab Take 12.5 mg by mouth every 4 (four) hours as needed.      sertraline (ZOLOFT) 100 MG tablet TAKE 1.5 TABLETS BY MOUTH ONCE DAILY. 135 tablet 1    sumatriptan (IMITREX) 100 MG tablet 1 tab oral may repeat every 2 hrs.. Max 2 tabs a day 9 tablet 3    etonogestreL-ethinyl estradioL (NUVARING) 0.12-0.015 mg/24 hr vaginal ring Place 1 each vaginally every 28 days. 1 each 11    metroNIDAZOLE (FLAGYL) 500 MG tablet Take 1 tablet (500 mg total) by mouth every 12 (twelve) hours. (Patient not taking: No sig reported) 14 tablet 3     No current facility-administered medications on file prior to visit.     Past Medical History:   Diagnosis Date    Wrist fracture     left     Past Surgical History:   Procedure Laterality Date    ORIF RADIUS & ULNA FRACTURES Left 2016    WISDOM TOOTH EXTRACTION       Family History   Problem Relation Age of Onset    Hyperlipidemia Father     Hypertension Father      Social History     Tobacco Use    Smoking status: Never    Smokeless tobacco: Never   Substance Use Topics    Alcohol use: Yes     Alcohol/week: 0.0 standard drinks     Comment: social    Drug use: No      Health Maintenance Topics with due status: Not Due       Topic Last Completion Date    TETANUS VACCINE 02/14/2018    Pneumococcal Vaccines (Age 0-64) 02/12/2021    Cervical Cancer Screening 08/24/2022     Immunization History   Administered Date(s) Administered    COVID-19, MRNA, LN-S, PF (Pfizer)  "(Purple Cap) 12/17/2020, 01/07/2021, 12/28/2021    DTaP 1989, 02/06/1990, 03/27/1990, 05/22/1991, 07/18/1995    HIB 01/02/1991    Hepatitis A, Adult 09/09/2020, 03/08/2021    Influenza (FLUBLOK) - Quadrivalent - Recombinant - PF *Preferred* (egg allergy) 02/04/2020, 10/13/2020    Influenza - Quadrivalent 09/10/2020    MMR 01/02/1991, 07/18/1995    Meningococcal Conjugate (MCV4P) 01/13/2022    OPV 1989, 02/06/1990, 05/22/1991, 07/18/1995    Pneumococcal Conjugate - 13 Valent 12/07/2020    Pneumococcal Polysaccharide - 23 Valent 02/12/2021       Review of Systems   Constitutional:  Negative for activity change and unexpected weight change.   HENT:  Negative for hearing loss, rhinorrhea and trouble swallowing.    Eyes:  Negative for discharge and visual disturbance.   Respiratory:  Negative for chest tightness and wheezing.    Cardiovascular:  Negative for chest pain and palpitations.   Gastrointestinal:  Negative for blood in stool, constipation, diarrhea and vomiting.   Endocrine: Negative for polydipsia and polyuria.   Genitourinary:  Negative for difficulty urinating, dysuria, hematuria and menstrual problem.   Musculoskeletal:  Negative for arthralgias, joint swelling and neck pain.   Neurological:  Negative for weakness and headaches.   Psychiatric/Behavioral:  Negative for confusion and dysphoric mood.    All other systems reviewed and are negative.   OBJECTIVE:      Vitals:    10/04/22 0815   BP: 112/76   BP Location: Left arm   Patient Position: Sitting   BP Method: Medium (Manual)   Pulse: 81   Resp: 16   SpO2: 97%   Weight: 84.8 kg (187 lb)   Height: 5' 6" (1.676 m)     Physical Exam  Vitals and nursing note reviewed.   Constitutional:       General: She is not in acute distress.     Appearance: Normal appearance. She is obese. She is not ill-appearing, toxic-appearing or diaphoretic.   HENT:      Head: Normocephalic and atraumatic.   Neck:      Vascular: No carotid bruit.   Cardiovascular:      " Rate and Rhythm: Normal rate and regular rhythm.      Pulses: Normal pulses.      Heart sounds: Normal heart sounds. No murmur heard.  Pulmonary:      Effort: Pulmonary effort is normal.      Breath sounds: Normal breath sounds.   Musculoskeletal:      Right lower leg: No edema.      Left lower leg: No edema.   Skin:     General: Skin is warm and dry.      Capillary Refill: Capillary refill takes less than 2 seconds.      Coloration: Skin is not jaundiced or pale.      Findings: Bruising present.      Comments: Not a new concern.   Neurological:      General: No focal deficit present.      Mental Status: She is alert and oriented to person, place, and time. Mental status is at baseline.      Cranial Nerves: No cranial nerve deficit.      Sensory: No sensory deficit.      Motor: No weakness.      Coordination: Coordination normal.      Gait: Gait normal.   Psychiatric:         Mood and Affect: Mood normal.         Behavior: Behavior normal.         Thought Content: Thought content normal.         Judgment: Judgment normal.      Assessment:       1. Routine general medical examination at a health care facility          Plan:       Routine general medical examination at a health care facility      Based on this evaluation today pending the lab work there are no pressing health issues to be addressed.  Patient continues to follow-up with infectious disease as before.      Seasonal influenza vaccination will be administered by her employer of the special formulation that is a good protein free.    Pt is well aware of the signs and symptoms to watch for and to seek medical attention in case these appear     Counseled on age and gender appropriate medical preventative services, including cancer screenings, immunizations, overall nutritional health, need for a consistent exercise regimen and an overall push towards maintaining a vigorous and active lifestyle.      No follow-ups on file.

## 2022-10-10 DIAGNOSIS — B20 HIV INFECTION, UNSPECIFIED SYMPTOM STATUS: ICD-10-CM

## 2022-10-10 RX ORDER — ABACAVIR SULFATE, DOLUTEGRAVIR SODIUM, LAMIVUDINE 600; 50; 300 MG/1; MG/1; MG/1
1 TABLET, FILM COATED ORAL DAILY
Qty: 90 TABLET | Refills: 1 | Status: SHIPPED | OUTPATIENT
Start: 2022-10-10 | End: 2023-04-09 | Stop reason: SDUPTHER

## 2022-10-11 ENCOUNTER — SPECIALTY PHARMACY (OUTPATIENT)
Dept: PHARMACY | Facility: CLINIC | Age: 32
End: 2022-10-11
Payer: COMMERCIAL

## 2022-10-11 NOTE — TELEPHONE ENCOUNTER
Specialty Pharmacy - Refill Coordination    Specialty Medication Orders Linked to Encounter      Flowsheet Row Most Recent Value   Medication #1 abacavir-dolutegravir-lamivud (TRIUMEQ) 600- mg Tab (Order#[id number], Rx#[id number])            Refill Questions - Documented Responses      Flowsheet Row Most Recent Value   Patient Availability and HIPAA Verification    Does patient want to proceed with activity? Yes   HIPAA/medical authority confirmed? Yes   Relationship to patient of person spoken to? Self   Refill Screening Questions    Changes to allergies? No   Changes to medications? No   New conditions since last clinic visit? No   Unplanned office visit, urgent care, ED, or hospital admission in the last 4 weeks? No   How does patient/caregiver feel medication is working? Good   Financial problems or insurance changes? No   How many doses of your specialty medications were missed in the last 4 weeks? 0   Would patient like to speak to a pharmacist? No   When does the patient need to receive the medication? 10/15/22   Refill Delivery Questions    How will the patient receive the medication? MEDRx   When does the patient need to receive the medication? 10/15/22   Shipping Address Home   Address in [facility] confirmed and updated if neccessary? Yes   Expected Copay ($) 0   Is the patient able to afford the medication copay? Yes   Payment Method zero copay   Days supply of Refill 30   Supplies needed? No supplies needed   Refill activity completed? Yes   Refill activity plan Refill scheduled   Shipment/Pickup Date: 10/13/22            Current Outpatient Medications   Medication Sig    abacavir-dolutegravir-lamivud (TRIUMEQ) 600- mg Tab Take 1 tablet by mouth once daily.    clorazepate (TRANXENE) 7.5 MG Tab Take half to 1 tablet by mouth 3 times a day as needed    diclofenac (VOLTAREN) 75 MG EC tablet Take 1 tablet (75 mg total) by mouth 2 (two) times daily as needed (pain).    ergocalciferol  (VITAMIN D2) 50,000 unit Cap Take 1 capsule (50,000 Units total) by mouth every 7 days.    etonogestreL-ethinyl estradioL (NUVARING) 0.12-0.015 mg/24 hr vaginal ring Place 1 each vaginally every 28 days.    linaCLOtide (LINZESS) 72 mcg Cap capsule Take 1 capsule (72 mcg total) by mouth before breakfast.    metroNIDAZOLE (FLAGYL) 500 MG tablet Take 1 tablet (500 mg total) by mouth every 12 (twelve) hours. (Patient not taking: No sig reported)    promethazine (PHENERGAN) 12.5 MG Tab Take 12.5 mg by mouth every 4 (four) hours as needed.    sertraline (ZOLOFT) 100 MG tablet TAKE 1.5 TABLETS BY MOUTH ONCE DAILY.    sumatriptan (IMITREX) 100 MG tablet 1 tab oral may repeat every 2 hrs.. Max 2 tabs a day   Last reviewed on 10/4/2022  8:16 AM by Jane Bazan MA    Review of patient's allergies indicates:   Allergen Reactions    Eggs [egg derived] Nausea Only    Ondansetron Dermatitis    Sulfa (sulfonamide antibiotics) Nausea Only    Sulfamethoxazole-trimethoprim Diarrhea    Last reviewed on  10/4/2022 8:17 AM by Jane Bazan      Tasks added this encounter   11/7/2022 - Refill Call (Auto Added)   Tasks due within next 3 months   No tasks due.     Judith Mackey [facility]  [address] [place] [id number]  Phone: [phone]  Fax: [phone]

## 2022-10-12 DIAGNOSIS — F41.9 ANXIETY AND DEPRESSION: ICD-10-CM

## 2022-10-12 DIAGNOSIS — F32.A ANXIETY AND DEPRESSION: ICD-10-CM

## 2022-10-12 RX ORDER — CLORAZEPATE DIPOTASSIUM 7.5 MG/1
TABLET ORAL
Qty: 30 TABLET | Refills: 0 | Status: SHIPPED | OUTPATIENT
Start: 2022-10-12 | End: 2022-10-24 | Stop reason: ALTCHOICE

## 2022-10-24 DIAGNOSIS — F32.A ANXIETY AND DEPRESSION: Primary | ICD-10-CM

## 2022-10-24 DIAGNOSIS — F41.9 ANXIETY AND DEPRESSION: Primary | ICD-10-CM

## 2022-10-24 RX ORDER — LORAZEPAM 0.5 MG/1
TABLET ORAL
Qty: 20 TABLET | Refills: 0 | Status: SHIPPED | OUTPATIENT
Start: 2022-10-24 | End: 2023-03-10

## 2022-10-27 ENCOUNTER — OFFICE VISIT (OUTPATIENT)
Dept: FAMILY MEDICINE | Facility: CLINIC | Age: 32
End: 2022-10-27
Payer: COMMERCIAL

## 2022-10-27 ENCOUNTER — CLINICAL SUPPORT (OUTPATIENT)
Dept: FAMILY MEDICINE | Facility: CLINIC | Age: 32
End: 2022-10-27
Payer: COMMERCIAL

## 2022-10-27 DIAGNOSIS — Z20.828 EXPOSURE TO INFLUENZA: Primary | ICD-10-CM

## 2022-10-27 DIAGNOSIS — J02.9 ACUTE SORE THROAT: ICD-10-CM

## 2022-10-27 DIAGNOSIS — Z20.828 EXPOSURE TO INFLUENZA: ICD-10-CM

## 2022-10-27 DIAGNOSIS — K29.70 VIRAL GASTRITIS: ICD-10-CM

## 2022-10-27 DIAGNOSIS — R11.2 NAUSEA AND VOMITING IN ADULT: ICD-10-CM

## 2022-10-27 LAB
CTP QC/QA: YES
CTP QC/QA: YES
POC MOLECULAR INFLUENZA A AGN: NEGATIVE
POC MOLECULAR INFLUENZA B AGN: NEGATIVE
SARS-COV-2 RDRP RESP QL NAA+PROBE: NEGATIVE

## 2022-10-27 PROCEDURE — 99213 PR OFFICE/OUTPT VISIT, EST, LEVL III, 20-29 MIN: ICD-10-PCS | Mod: 95,,, | Performed by: NURSE PRACTITIONER

## 2022-10-27 PROCEDURE — 87635 SARS-COV-2 COVID-19 AMP PRB: CPT | Mod: QW,S$GLB,, | Performed by: NURSE PRACTITIONER

## 2022-10-27 PROCEDURE — 99213 OFFICE O/P EST LOW 20 MIN: CPT | Mod: 95,,, | Performed by: NURSE PRACTITIONER

## 2022-10-27 PROCEDURE — 87635: ICD-10-PCS | Mod: QW,S$GLB,, | Performed by: NURSE PRACTITIONER

## 2022-10-28 ENCOUNTER — CLINICAL SUPPORT (OUTPATIENT)
Dept: FAMILY MEDICINE | Facility: CLINIC | Age: 32
End: 2022-10-28
Payer: COMMERCIAL

## 2022-10-28 DIAGNOSIS — Z20.828 EXPOSURE TO INFLUENZA: Primary | ICD-10-CM

## 2022-10-28 LAB
CTP QC/QA: YES
POC MOLECULAR INFLUENZA A AGN: NEGATIVE
POC MOLECULAR INFLUENZA B AGN: NEGATIVE

## 2022-10-28 PROCEDURE — 99499 NO LOS: ICD-10-PCS | Mod: S$GLB,,, | Performed by: NURSE PRACTITIONER

## 2022-10-28 PROCEDURE — 99499 UNLISTED E&M SERVICE: CPT | Mod: S$GLB,,, | Performed by: NURSE PRACTITIONER

## 2022-10-28 PROCEDURE — 87502 INFLUENZA DNA AMP PROBE: CPT | Mod: QW,S$GLB,, | Performed by: NURSE PRACTITIONER

## 2022-10-28 PROCEDURE — 87502 POCT INFLUENZA A/B MOLECULAR: ICD-10-PCS | Mod: QW,S$GLB,, | Performed by: NURSE PRACTITIONER

## 2022-11-07 ENCOUNTER — SPECIALTY PHARMACY (OUTPATIENT)
Dept: PHARMACY | Facility: CLINIC | Age: 32
End: 2022-11-07
Payer: COMMERCIAL

## 2022-11-07 ENCOUNTER — PATIENT MESSAGE (OUTPATIENT)
Dept: PHARMACY | Facility: CLINIC | Age: 32
End: 2022-11-07
Payer: COMMERCIAL

## 2022-11-07 RX ORDER — DULOXETIN HYDROCHLORIDE 60 MG/1
60 CAPSULE, DELAYED RELEASE ORAL DAILY
COMMUNITY
End: 2022-11-15 | Stop reason: ALTCHOICE

## 2022-11-07 NOTE — TELEPHONE ENCOUNTER
Specialty Pharmacy - Refill Coordination    Specialty Medication Orders Linked to Encounter      Flowsheet Row Most Recent Value   Medication #1 abacavir-dolutegravir-lamivud (TRIUMEQ) 600- mg Tab (Order#[id number], Rx#[id number])            Refill Questions - Documented Responses      Flowsheet Row Most Recent Value   Patient Availability and HIPAA Verification    Does patient want to proceed with activity? Yes   HIPAA/medical authority confirmed? Yes   Relationship to patient of person spoken to? Self   Refill Screening Questions    Changes to allergies? No   Changes to medications? Yes  [Change from zoloft to cymbalta - NO DIs identified]   New conditions since last clinic visit? No   Unplanned office visit, urgent care, ED, or hospital admission in the last 4 weeks? No   How does patient/caregiver feel medication is working? Good   Financial problems or insurance changes? No   How many doses of your specialty medications were missed in the last 4 weeks? 0   Would patient like to speak to a pharmacist? No   When does the patient need to receive the medication? 11/19/22   Refill Delivery Questions    How will the patient receive the medication? Mail   When does the patient need to receive the medication? 11/19/22   Shipping Address Home   Address in [facility] confirmed and updated if neccessary? Yes   Expected Copay ($) 0   Is the patient able to afford the medication copay? Yes   Payment Method zero copay   Days supply of Refill 30   Supplies needed? No supplies needed   Refill activity completed? Yes   Refill activity plan Refill scheduled   Shipment/Pickup Date: 11/14/22            Current Outpatient Medications   Medication Sig    DULoxetine (CYMBALTA) 60 MG capsule Take 60 mg by mouth once daily.    abacavir-dolutegravir-lamivud (TRIUMEQ) 600- mg Tab Take 1 tablet by mouth once daily.    diclofenac (VOLTAREN) 75 MG EC tablet Take 1 tablet (75 mg total) by mouth 2 (two) times daily as  needed (pain).    ergocalciferol (VITAMIN D2) 50,000 unit Cap Take 1 capsule (50,000 Units total) by mouth every 7 days.    etonogestreL-ethinyl estradioL (NUVARING) 0.12-0.015 mg/24 hr vaginal ring Place 1 each vaginally every 28 days.    linaCLOtide (LINZESS) 72 mcg Cap capsule Take 1 capsule (72 mcg total) by mouth before breakfast.    LORazepam (ATIVAN) 0.5 MG tablet Take half to whole tablet by mouth twice a day as needed for anxiety    promethazine (PHENERGAN) 12.5 MG Tab Take 12.5 mg by mouth every 4 (four) hours as needed.    sumatriptan (IMITREX) 100 MG tablet 1 tab oral may repeat every 2 hrs.. Max 2 tabs a day   Last reviewed on 10/4/2022  8:16 AM by Jane Chong MA    Review of patient's allergies indicates:   Allergen Reactions    Eggs [egg derived] Nausea Only    Ondansetron Dermatitis    Sulfa (sulfonamide antibiotics) Nausea Only    Sulfamethoxazole-trimethoprim Diarrhea    Last reviewed on  10/24/2022 3:00 PM by Garrett Marino      Tasks added this encounter   12/12/2022 - Refill Call (Auto Added)   Tasks due within next 3 months   No tasks due.     Justin Drew, PharmD  [facility]  [address] [place] [id number]  Phone: [phone]  Fax: [phone]

## 2022-11-15 ENCOUNTER — OFFICE VISIT (OUTPATIENT)
Dept: FAMILY MEDICINE | Facility: CLINIC | Age: 32
End: 2022-11-15
Payer: COMMERCIAL

## 2022-11-15 VITALS
HEART RATE: 88 BPM | WEIGHT: 184 LBS | HEIGHT: 66 IN | RESPIRATION RATE: 18 BRPM | BODY MASS INDEX: 29.57 KG/M2 | SYSTOLIC BLOOD PRESSURE: 118 MMHG | DIASTOLIC BLOOD PRESSURE: 74 MMHG | OXYGEN SATURATION: 98 %

## 2022-11-15 DIAGNOSIS — R79.89 ABNORMAL THYROID BLOOD TEST: ICD-10-CM

## 2022-11-15 DIAGNOSIS — F32.A ANXIETY AND DEPRESSION: Primary | ICD-10-CM

## 2022-11-15 DIAGNOSIS — F41.9 ANXIETY AND DEPRESSION: Primary | ICD-10-CM

## 2022-11-15 LAB
T4 FREE SERPL-MCNC: 0.95 NG/DL (ref 0.71–1.51)
TSH SERPL DL<=0.005 MIU/L-ACNC: 0.42 UIU/ML (ref 0.4–4)

## 2022-11-15 PROCEDURE — 3078F DIAST BP <80 MM HG: CPT | Mod: S$GLB,,, | Performed by: INTERNAL MEDICINE

## 2022-11-15 PROCEDURE — 84439 ASSAY OF FREE THYROXINE: CPT | Performed by: INTERNAL MEDICINE

## 2022-11-15 PROCEDURE — 3074F PR MOST RECENT SYSTOLIC BLOOD PRESSURE < 130 MM HG: ICD-10-PCS | Mod: S$GLB,,, | Performed by: INTERNAL MEDICINE

## 2022-11-15 PROCEDURE — 3074F SYST BP LT 130 MM HG: CPT | Mod: S$GLB,,, | Performed by: INTERNAL MEDICINE

## 2022-11-15 PROCEDURE — 84443 ASSAY THYROID STIM HORMONE: CPT | Performed by: INTERNAL MEDICINE

## 2022-11-15 PROCEDURE — 1159F PR MEDICATION LIST DOCUMENTED IN MEDICAL RECORD: ICD-10-PCS | Mod: S$GLB,,, | Performed by: INTERNAL MEDICINE

## 2022-11-15 PROCEDURE — 1159F MED LIST DOCD IN RCRD: CPT | Mod: S$GLB,,, | Performed by: INTERNAL MEDICINE

## 2022-11-15 PROCEDURE — 99212 OFFICE O/P EST SF 10 MIN: CPT | Mod: S$GLB,,, | Performed by: INTERNAL MEDICINE

## 2022-11-15 PROCEDURE — 3078F PR MOST RECENT DIASTOLIC BLOOD PRESSURE < 80 MM HG: ICD-10-PCS | Mod: S$GLB,,, | Performed by: INTERNAL MEDICINE

## 2022-11-15 PROCEDURE — 3008F PR BODY MASS INDEX (BMI) DOCUMENTED: ICD-10-PCS | Mod: S$GLB,,, | Performed by: INTERNAL MEDICINE

## 2022-11-15 PROCEDURE — 99212 PR OFFICE/OUTPT VISIT, EST, LEVL II, 10-19 MIN: ICD-10-PCS | Mod: S$GLB,,, | Performed by: INTERNAL MEDICINE

## 2022-11-15 PROCEDURE — 3008F BODY MASS INDEX DOCD: CPT | Mod: S$GLB,,, | Performed by: INTERNAL MEDICINE

## 2022-11-15 RX ORDER — PAROXETINE HYDROCHLORIDE 40 MG/1
40 TABLET, FILM COATED ORAL EVERY MORNING
Qty: 30 TABLET | Refills: 11 | Status: SHIPPED | OUTPATIENT
Start: 2022-11-15 | End: 2023-03-10

## 2022-11-15 NOTE — PROGRESS NOTES
Subjective:       Patient ID: Veronique Vick is a 32 y.o. female.    Chief Complaint: Anxiety    Patient presents today because of worsening anxiety with increasing somatization.  Been experiencing some chest pressure and also NC changes in her mood lability.  Current dose of Cymbalta 60 mg a day has not been effective at controlling her symptoms and she is having to use rescue therapy with the Ativan 0.5 mg.    As of now, because of the fact that she is already been on the selective serotonin reuptake inhibitor therapy she will be switched over to Paxil 40 mg every day and we do not expect for there to be any significant serotonin syndrome like symptomatology since her serotonin levels are already increased by the prior therapy.      Patient has enough of the lorazepam left now on no prescription is being issued for same.      Controlled substance contract has been signed by her today.  No urine for toxicology has been obtained.      On review of the lab work because of significant changes in her thyroid function testing will take advantage of the fact that she is here today and go ahead and do the TSH and free T4 for follow-up.      Being that she has allergy to eggs she has to wait until the age protein free influenza vaccination is available for her and this will be provided by human resources.    Medication list has been reviewed and there is no need for any other refills.          Review of Systems   Constitutional:  Positive for activity change and unexpected weight change.   HENT:  Negative for hearing loss, rhinorrhea and trouble swallowing.    Eyes:  Negative for discharge and visual disturbance.   Respiratory:  Positive for chest tightness. Negative for wheezing.    Cardiovascular:  Negative for chest pain and palpitations.   Gastrointestinal:  Negative for blood in stool, constipation, diarrhea and vomiting.   Endocrine: Negative for polydipsia and polyuria.   Genitourinary:  Negative for difficulty  urinating, dysuria, hematuria and menstrual problem.   Musculoskeletal:  Negative for arthralgias, joint swelling and neck pain.   Neurological:  Positive for headaches. Negative for weakness.   Psychiatric/Behavioral:  Positive for dysphoric mood. Negative for confusion.    All other systems reviewed and are negative.      Objective:      Physical Exam  Vitals and nursing note reviewed.   Constitutional:       General: She is not in acute distress.     Appearance: Normal appearance. She is normal weight. She is not ill-appearing, toxic-appearing or diaphoretic.   HENT:      Head: Normocephalic and atraumatic.   Skin:     General: Skin is warm and dry.      Coloration: Skin is not jaundiced or pale.   Neurological:      General: No focal deficit present.      Mental Status: She is alert and oriented to person, place, and time. Mental status is at baseline.      Cranial Nerves: No cranial nerve deficit.      Sensory: No sensory deficit.      Motor: No weakness.      Coordination: Coordination normal.      Gait: Gait normal.   Psychiatric:         Mood and Affect: Mood normal.         Behavior: Behavior normal.         Thought Content: Thought content normal.         Judgment: Judgment normal.       Assessment:       Problem List Items Addressed This Visit    None  Visit Diagnoses       Anxiety and depression    -  Primary    Relevant Medications    paroxetine (PAXIL) 40 MG tablet    Abnormal thyroid blood test                  Plan:       As per HPI patient will have the Cymbalta discontinued at this time and in place will be started on Paxil 40 mg every day.  A trial of this medication list for month has been planned on a prescription for 30 tablets with 11 refills has been issued today.  This is effective then he will be changed to a 90 day supply with 3 refills.      She has enough of the Ativan for p.r.n. use.      Controlled substance agreement has been signed today.      TSH and free T4 have been drawn today.       Seasonal influenza vaccination of the egg protein free formulation will be provided by her employer and is to be administered here at the clinic as soon as available.

## 2022-11-29 ENCOUNTER — OFFICE VISIT (OUTPATIENT)
Dept: FAMILY MEDICINE | Facility: CLINIC | Age: 32
End: 2022-11-29
Payer: COMMERCIAL

## 2022-11-29 VITALS
RESPIRATION RATE: 18 BRPM | HEIGHT: 66 IN | DIASTOLIC BLOOD PRESSURE: 84 MMHG | HEART RATE: 76 BPM | WEIGHT: 184.5 LBS | OXYGEN SATURATION: 97 % | BODY MASS INDEX: 29.65 KG/M2 | SYSTOLIC BLOOD PRESSURE: 122 MMHG

## 2022-11-29 DIAGNOSIS — F32.A ANXIETY AND DEPRESSION: Primary | ICD-10-CM

## 2022-11-29 DIAGNOSIS — G43.829 MENSTRUAL MIGRAINE WITHOUT STATUS MIGRAINOSUS, NOT INTRACTABLE: ICD-10-CM

## 2022-11-29 DIAGNOSIS — F41.9 ANXIETY AND DEPRESSION: Primary | ICD-10-CM

## 2022-11-29 DIAGNOSIS — K59.09 CHRONIC CONSTIPATION: ICD-10-CM

## 2022-11-29 PROCEDURE — 3079F PR MOST RECENT DIASTOLIC BLOOD PRESSURE 80-89 MM HG: ICD-10-PCS | Mod: S$GLB,,, | Performed by: INTERNAL MEDICINE

## 2022-11-29 PROCEDURE — 3008F PR BODY MASS INDEX (BMI) DOCUMENTED: ICD-10-PCS | Mod: S$GLB,,, | Performed by: INTERNAL MEDICINE

## 2022-11-29 PROCEDURE — 3074F SYST BP LT 130 MM HG: CPT | Mod: S$GLB,,, | Performed by: INTERNAL MEDICINE

## 2022-11-29 PROCEDURE — 99213 PR OFFICE/OUTPT VISIT, EST, LEVL III, 20-29 MIN: ICD-10-PCS | Mod: S$GLB,,, | Performed by: INTERNAL MEDICINE

## 2022-11-29 PROCEDURE — 3008F BODY MASS INDEX DOCD: CPT | Mod: S$GLB,,, | Performed by: INTERNAL MEDICINE

## 2022-11-29 PROCEDURE — 1159F PR MEDICATION LIST DOCUMENTED IN MEDICAL RECORD: ICD-10-PCS | Mod: S$GLB,,, | Performed by: INTERNAL MEDICINE

## 2022-11-29 PROCEDURE — 99213 OFFICE O/P EST LOW 20 MIN: CPT | Mod: S$GLB,,, | Performed by: INTERNAL MEDICINE

## 2022-11-29 PROCEDURE — 3074F PR MOST RECENT SYSTOLIC BLOOD PRESSURE < 130 MM HG: ICD-10-PCS | Mod: S$GLB,,, | Performed by: INTERNAL MEDICINE

## 2022-11-29 PROCEDURE — 3079F DIAST BP 80-89 MM HG: CPT | Mod: S$GLB,,, | Performed by: INTERNAL MEDICINE

## 2022-11-29 PROCEDURE — 1159F MED LIST DOCD IN RCRD: CPT | Mod: S$GLB,,, | Performed by: INTERNAL MEDICINE

## 2022-11-29 NOTE — PROGRESS NOTES
Subjective:       Patient ID: Veronique Vick is a 32 y.o. female.    Chief Complaint: Follow-up (Pt presents to the clinic to f/u on medication ) and Anxiety    Returns for follow-up on the change over to the Paxil therapy and follow-up of her anxiety management.      Symptomatology seems to be improved when compared to the therapy with Zoloft.  She is having to use less rescue therapy with the lorazepam than before.  In fact the last time that she had to resort to same was over 10 days ago.    Regarding the stress so she is been able to deal with them in a more efficient way and has been able to also with regards to interpersonal relationships clear some air which has also improved even her sleep pattern.    With regards to vegetative symptoms her appetite is good she is been able to also reconciled asleep easier and stay asleep longer.  This is without medication.    Regarding her chronic idiopathic constipation even the Linzess has not been needed to be used lately.    Is obvious by this the patient is significantly improved from prior to taking the leave of absence and she considers herself ready to be able to resume all her activities professionally without any restrictions as of December 5th.    [facility] papers will be filled out today reflect these changes.      Regarding the care gaps she is still pending having her influenza vaccination being that she needs the formulation that is egg protein free.  She knows to be able to self referred back to get these administered in the next 2 or 3 days.    Medication list has been modified to reflect the fact that she is no longer on the NuvaRing because of alternative therapy.        Review of Systems   All other systems reviewed and are negative.      Objective:      Physical Exam  Vitals and nursing note reviewed.   Constitutional:       General: She is not in acute distress.     Appearance: Normal appearance. She is normal weight. She is not ill-appearing,  toxic-appearing or diaphoretic.   HENT:      Head: Normocephalic and atraumatic.   Cardiovascular:      Rate and Rhythm: Normal rate and regular rhythm.      Pulses: Normal pulses.      Heart sounds: Normal heart sounds. No murmur heard.  Pulmonary:      Effort: Pulmonary effort is normal.      Breath sounds: Normal breath sounds.   Skin:     General: Skin is warm and dry.      Coloration: Skin is not jaundiced or pale.   Neurological:      General: No focal deficit present.      Mental Status: She is alert and oriented to person, place, and time. Mental status is at baseline.      Cranial Nerves: No cranial nerve deficit.      Sensory: No sensory deficit.      Motor: No weakness.      Coordination: Coordination normal.      Gait: Gait normal.   Psychiatric:         Mood and Affect: Mood normal.         Behavior: Behavior normal.         Thought Content: Thought content normal.         Judgment: Judgment normal.       Assessment:       Problem List Items Addressed This Visit          Neuro    Migraine     Other Visit Diagnoses       Anxiety and depression    -  Primary    Chronic constipation                Plan:       At this point patient is considered to be improved to the point that she should be able to resume her usual activities without any restrictions as of December 5, 2022.  [facility] papers will be filled out reflecting this change.  Medications will continue as now with the Paxil 40 mg every day on the use of the lorazepam 0.5 mg twice a day as needed for anxiety management.    Medication list has been modified to reflect the fact that she is no longer on the NuvaRing.    Regarding the chronic idiopathic constipation she has had significant improvement on same and is using the Linzess 72 mg only as needed.      Migraines are also improved after the discontinuation of the NuvaRing.  It seemed to definitely be menstrual related.  She has not had a status migrainosus.  Patient does have access to the Imitrex for  being able to break these headaches.

## 2022-12-12 ENCOUNTER — PATIENT MESSAGE (OUTPATIENT)
Dept: PHARMACY | Facility: CLINIC | Age: 32
End: 2022-12-12
Payer: COMMERCIAL

## 2022-12-12 ENCOUNTER — SPECIALTY PHARMACY (OUTPATIENT)
Dept: PHARMACY | Facility: CLINIC | Age: 32
End: 2022-12-12
Payer: COMMERCIAL

## 2022-12-12 NOTE — TELEPHONE ENCOUNTER
Specialty Pharmacy - Refill Coordination    Specialty Medication Orders Linked to Encounter      Flowsheet Row Most Recent Value   Medication #1 abacavir-dolutegravir-lamivud (TRIUMEQ) 600- mg Tab (Order#[id number], Rx#[id number])            Refill Questions - Documented Responses      Flowsheet Row Most Recent Value   Patient Availability and HIPAA Verification    Does patient want to proceed with activity? Yes   HIPAA/medical authority confirmed? Yes   Relationship to patient of person spoken to? Self   Refill Screening Questions    Changes to allergies? No   Changes to medications? No   New conditions since last clinic visit? No   Unplanned office visit, urgent care, ED, or hospital admission in the last 4 weeks? No   How does patient/caregiver feel medication is working? Good   Financial problems or insurance changes? No   How many doses of your specialty medications were missed in the last 4 weeks? 0   Would patient like to speak to a pharmacist? No   When does the patient need to receive the medication? 12/20/22   Refill Delivery Questions    How will the patient receive the medication? Mail   When does the patient need to receive the medication? 12/20/22   Shipping Address Home   Address in [facility] confirmed and updated if neccessary? Yes   Expected Copay ($) 0   Is the patient able to afford the medication copay? Yes   Payment Method zero copay   Days supply of Refill 30   Supplies needed? No supplies needed   Refill activity completed? Yes   Refill activity plan Refill scheduled   Shipment/Pickup Date: 12/15/22            Current Outpatient Medications   Medication Sig    abacavir-dolutegravir-lamivud (TRIUMEQ) 600- mg Tab Take 1 tablet by mouth once daily.    diclofenac (VOLTAREN) 75 MG EC tablet Take 1 tablet (75 mg total) by mouth 2 (two) times daily as needed (pain).    ergocalciferol (VITAMIN D2) 50,000 unit Cap Take 1 capsule (50,000 Units total) by mouth every 7 days.     linaCLOtide (LINZESS) 72 mcg Cap capsule Take 1 capsule (72 mcg total) by mouth before breakfast.    LORazepam (ATIVAN) 0.5 MG tablet Take half to whole tablet by mouth twice a day as needed for anxiety    paroxetine (PAXIL) 40 MG tablet Take 1 tablet (40 mg total) by mouth every morning.    promethazine (PHENERGAN) 12.5 MG Tab Take 12.5 mg by mouth every 4 (four) hours as needed.    sumatriptan (IMITREX) 100 MG tablet 1 tab oral may repeat every 2 hrs.. Max 2 tabs a day   Last reviewed on 11/29/2022  8:28 AM by Jane Bazan MA    Review of patient's allergies indicates:   Allergen Reactions    Eggs [egg derived] Nausea Only    Ondansetron Dermatitis    Sulfa (sulfonamide antibiotics) Nausea Only    Sulfamethoxazole-trimethoprim Diarrhea    Last reviewed on  11/29/2022 8:25 AM by Jane Bazan      Tasks added this encounter   1/12/2023 - Refill Call (Auto Added)   Tasks due within next 3 months   3/6/2023 - Clinical - Follow Up Assesement (Annual)     Shani Izaguirre, PharmD  [facility]  [address] [place] [id number]  Phone: [phone]  Fax: [phone]         79 y/o F w/ hx of arrhythmia s/p ablation and incarcerated R femoral hernia s/p small bowel resection presents with several months of generalized weakness and 2 weeks of lower abdominal discomfort, being treated for pseudomonal UTI, also found to have bilateral pleff, possibly 2/2 to acute on chronic HFrEF s/p thoracentesis, workup for new metastatic malignancy.

## 2022-12-19 DIAGNOSIS — R14.0 ABDOMINAL BLOATING: Primary | ICD-10-CM

## 2022-12-19 PROCEDURE — 82784 ASSAY IGA/IGD/IGG/IGM EACH: CPT | Performed by: INTERNAL MEDICINE

## 2022-12-19 PROCEDURE — 86364 TISS TRNSGLTMNASE EA IG CLAS: CPT | Performed by: INTERNAL MEDICINE

## 2022-12-23 ENCOUNTER — OFFICE VISIT (OUTPATIENT)
Dept: FAMILY MEDICINE | Facility: CLINIC | Age: 32
End: 2022-12-23
Payer: COMMERCIAL

## 2022-12-23 DIAGNOSIS — J01.90 ACUTE NON-RECURRENT SINUSITIS, UNSPECIFIED LOCATION: Primary | ICD-10-CM

## 2022-12-23 DIAGNOSIS — B37.31 YEAST VAGINITIS: ICD-10-CM

## 2022-12-23 PROCEDURE — 99213 OFFICE O/P EST LOW 20 MIN: CPT | Mod: 95,,,

## 2022-12-23 PROCEDURE — 1159F MED LIST DOCD IN RCRD: CPT | Mod: 95,,,

## 2022-12-23 PROCEDURE — 99213 PR OFFICE/OUTPT VISIT, EST, LEVL III, 20-29 MIN: ICD-10-PCS | Mod: 95,,,

## 2022-12-23 PROCEDURE — 1160F PR REVIEW ALL MEDS BY PRESCRIBER/CLIN PHARMACIST DOCUMENTED: ICD-10-PCS | Mod: 95,,,

## 2022-12-23 PROCEDURE — 1159F PR MEDICATION LIST DOCUMENTED IN MEDICAL RECORD: ICD-10-PCS | Mod: 95,,,

## 2022-12-23 PROCEDURE — 1160F RVW MEDS BY RX/DR IN RCRD: CPT | Mod: 95,,,

## 2022-12-23 RX ORDER — FLUCONAZOLE 150 MG/1
150 TABLET ORAL DAILY
Qty: 2 TABLET | Refills: 0 | Status: SHIPPED | OUTPATIENT
Start: 2022-12-23 | End: 2022-12-25

## 2022-12-23 RX ORDER — AMOXICILLIN AND CLAVULANATE POTASSIUM 875; 125 MG/1; MG/1
1 TABLET, FILM COATED ORAL EVERY 12 HOURS
Qty: 20 TABLET | Refills: 0 | Status: SHIPPED | OUTPATIENT
Start: 2022-12-23 | End: 2023-01-02

## 2022-12-23 NOTE — PROGRESS NOTES
Subjective:       Patient ID: Veronique Vick is a 32 y.o. female.  The patient location is: [place], [place]  The chief complaint leading to consultation is: sinus infection    Visit type: audiovisual    Face to Face time with patient: 5 minutes  10 minutes of total time spent on the encounter, which includes face to face time and non-face to face time preparing to see the patient (eg, review of tests), Obtaining and/or reviewing separately obtained history, Documenting clinical information in the electronic or other health record, Independently interpreting results (not separately reported) and communicating results to the patient/family/caregiver, or Care coordination (not separately reported).         Each patient to whom he or she provides medical services by telemedicine is:  (1) informed of the relationship between the physician and patient and the respective role of any other health care provider with respect to management of the patient; and (2) notified that he or she may decline to receive medical services by telemedicine and may withdraw from such care at any time.      Chief Complaint: Sinus congestion    Patient presents virtually to the clinic with complaints or cough, congestion, ear fullness, and post nasal drip.     Symptoms started yesterday and seem to be worsening. She has tried [place] cold and sinus and ipratropium inhaler with mild relief. Cough is productive and nasal drainage is green in color.     Patient educated on plan of care, verbalized understanding.      Cough  This is a new problem. The current episode started yesterday. The problem has been gradually worsening. The problem occurs every few hours. The cough is Productive of sputum. Associated symptoms include ear congestion, nasal congestion and postnasal drip. Pertinent negatives include no chest pain, chills, ear pain, fever, headaches, heartburn, hemoptysis, myalgias, rash, rhinorrhea, sore throat, shortness of breath,  sweats, weight loss or wheezing. Nothing aggravates the symptoms. She has tried OTC cough suppressant, ipratropium inhaler and rest for the symptoms. The treatment provided mild relief. There is no history of asthma, bronchiectasis, bronchitis, COPD, emphysema, environmental allergies or pneumonia.   Review of Systems   Constitutional:  Negative for chills, fever and weight loss.   HENT:  Positive for postnasal drip and sinus pressure. Negative for ear pain, rhinorrhea and sore throat.    Respiratory:  Positive for cough. Negative for hemoptysis, shortness of breath and wheezing.    Cardiovascular:  Negative for chest pain.   Gastrointestinal:  Negative for heartburn.   Musculoskeletal:  Negative for myalgias.   Skin:  Negative for rash.   Allergic/Immunologic: Negative for environmental allergies.   Neurological:  Negative for headaches.     Patient Active Problem List   Diagnosis    Wrist fracture    Closed fracture of distal end of left radius with routine healing    Pap smear of cervix with ASCUS, cannot exclude HGSIL    Migraine    Irritable bowel syndrome    Decreased strength    Muscle tightness    Posture abnormality       Objective:      Physical Exam  Constitutional:       General: She is not in acute distress.     Appearance: Normal appearance. She is not ill-appearing.   HENT:      Head: Normocephalic.   Eyes:      Conjunctiva/sclera: Conjunctivae normal.      Pupils: Pupils are equal, round, and reactive to light.      Comments: As seen on virtual visit   Pulmonary:      Effort: Pulmonary effort is normal. No respiratory distress.   Musculoskeletal:      Cervical back: Normal range of motion and neck supple.   Skin:     General: Skin is warm and dry.   Neurological:      General: No focal deficit present.      Mental Status: She is alert and oriented to person, place, and time.   Psychiatric:         Mood and Affect: Mood normal.         Behavior: Behavior normal.       Lab Results   Component Value  Date    WBC 6.22 10/04/2022    HGB 12.7 10/04/2022    HCT 37.9 10/04/2022     10/04/2022    CHOL 208 (H) 10/04/2022    TRIG 76 10/04/2022    HDL 64 10/04/2022    ALT 23 10/04/2022    AST 19 10/04/2022     10/04/2022    K 3.8 10/04/2022     10/04/2022    CREATININE 0.8 10/04/2022    BUN 9 10/04/2022    CO2 22 (L) 10/04/2022    TSH 0.421 11/15/2022     The ASCVD Risk score (Willard DK, et al., 2019) failed to calculate for the following reasons:    The 2019 ASCVD risk score is only valid for ages 40 to 79    Assessment:       1. Acute non-recurrent sinusitis, unspecified location    2. Yeast vaginitis        Plan:       1. Acute non-recurrent sinusitis, unspecified location  -     amoxicillin-clavulanate 875-125mg (AUGMENTIN) 875-125 mg per tablet; Take 1 tablet by mouth every 12 (twelve) hours. for 10 days  Dispense: 20 tablet; Refill: 0   - Nasal saline flushes twice daily     2. Yeast vaginitis  -     fluconazole (DIFLUCAN) 150 MG Tab; Take 1 tablet (150 mg total) by mouth once daily. for 2 days  Dispense: 2 tablet; Refill: 0       Follow up if symptoms worsen or fail to improve.      Future Appointments       Date Provider Specialty Appt Notes    2/7/2023 Yevgeniy Fields MD Obstetrics and Gynecology repeat pap smear

## 2022-12-23 NOTE — PATIENT INSTRUCTIONS

## 2022-12-27 ENCOUNTER — TELEPHONE (OUTPATIENT)
Dept: INFECTIOUS DISEASES | Facility: CLINIC | Age: 32
End: 2022-12-27

## 2022-12-27 ENCOUNTER — PATIENT MESSAGE (OUTPATIENT)
Dept: INFECTIOUS DISEASES | Facility: CLINIC | Age: 32
End: 2022-12-27

## 2022-12-27 DIAGNOSIS — B20 HIV INFECTION, UNSPECIFIED SYMPTOM STATUS: Primary | ICD-10-CM

## 2022-12-27 NOTE — TELEPHONE ENCOUNTER
Sent message to Dr Diehl who ordered labs    Patient notified     J [facility]   12/27/22

## 2022-12-28 ENCOUNTER — LAB VISIT (OUTPATIENT)
Dept: LAB | Facility: CLINIC | Age: 32
End: 2022-12-28
Payer: COMMERCIAL

## 2022-12-28 DIAGNOSIS — B20 HIV INFECTION, UNSPECIFIED SYMPTOM STATUS: ICD-10-CM

## 2022-12-28 LAB
ALBUMIN SERPL BCP-MCNC: 4 G/DL (ref 3.5–5.2)
ALP SERPL-CCNC: 63 U/L (ref 55–135)
ALT SERPL W/O P-5'-P-CCNC: 28 U/L (ref 10–44)
ANION GAP SERPL CALC-SCNC: 8 MMOL/L (ref 8–16)
AST SERPL-CCNC: 21 U/L (ref 10–40)
BASOPHILS # BLD AUTO: 0.06 K/UL (ref 0–0.2)
BASOPHILS NFR BLD: 1.1 % (ref 0–1.9)
BILIRUB SERPL-MCNC: 0.4 MG/DL (ref 0.1–1)
BUN SERPL-MCNC: 10 MG/DL (ref 6–20)
CALCIUM SERPL-MCNC: 9 MG/DL (ref 8.7–10.5)
CHLORIDE SERPL-SCNC: 109 MMOL/L (ref 95–110)
CO2 SERPL-SCNC: 22 MMOL/L (ref 23–29)
CREAT SERPL-MCNC: 0.9 MG/DL (ref 0.5–1.4)
DIFFERENTIAL METHOD: ABNORMAL
EOSINOPHIL # BLD AUTO: 0.4 K/UL (ref 0–0.5)
EOSINOPHIL NFR BLD: 6.4 % (ref 0–8)
ERYTHROCYTE [DISTWIDTH] IN BLOOD BY AUTOMATED COUNT: 12.3 % (ref 11.5–14.5)
EST. GFR  (NO RACE VARIABLE): >60 ML/MIN/1.73 M^2
GLUCOSE SERPL-MCNC: 86 MG/DL (ref 70–110)
HCT VFR BLD AUTO: 40 % (ref 37–48.5)
HGB BLD-MCNC: 13.4 G/DL (ref 12–16)
IMM GRANULOCYTES # BLD AUTO: 0 K/UL (ref 0–0.04)
IMM GRANULOCYTES NFR BLD AUTO: 0 % (ref 0–0.5)
LYMPHOCYTES # BLD AUTO: 2.6 K/UL (ref 1–4.8)
LYMPHOCYTES NFR BLD: 46.5 % (ref 18–48)
MCH RBC QN AUTO: 31.7 PG (ref 27–31)
MCHC RBC AUTO-ENTMCNC: 33.5 G/DL (ref 32–36)
MCV RBC AUTO: 95 FL (ref 82–98)
MONOCYTES # BLD AUTO: 0.3 K/UL (ref 0.3–1)
MONOCYTES NFR BLD: 5.5 % (ref 4–15)
NEUTROPHILS # BLD AUTO: 2.3 K/UL (ref 1.8–7.7)
NEUTROPHILS NFR BLD: 40.5 % (ref 38–73)
NRBC BLD-RTO: 0 /100 WBC
PLATELET # BLD AUTO: 214 K/UL (ref 150–450)
PMV BLD AUTO: 12.1 FL (ref 9.2–12.9)
POTASSIUM SERPL-SCNC: 4.5 MMOL/L (ref 3.5–5.1)
PROT SERPL-MCNC: 7.2 G/DL (ref 6–8.4)
RBC # BLD AUTO: 4.23 M/UL (ref 4–5.4)
SODIUM SERPL-SCNC: 139 MMOL/L (ref 136–145)
WBC # BLD AUTO: 5.59 K/UL (ref 3.9–12.7)

## 2022-12-28 PROCEDURE — 87536 HIV-1 QUANT&REVRSE TRNSCRPJ: CPT | Performed by: STUDENT IN AN ORGANIZED HEALTH CARE EDUCATION/TRAINING PROGRAM

## 2022-12-28 PROCEDURE — 80053 COMPREHEN METABOLIC PANEL: CPT | Performed by: STUDENT IN AN ORGANIZED HEALTH CARE EDUCATION/TRAINING PROGRAM

## 2022-12-28 PROCEDURE — 86361 T CELL ABSOLUTE COUNT: CPT | Performed by: STUDENT IN AN ORGANIZED HEALTH CARE EDUCATION/TRAINING PROGRAM

## 2022-12-28 PROCEDURE — 85025 COMPLETE CBC W/AUTO DIFF WBC: CPT | Performed by: STUDENT IN AN ORGANIZED HEALTH CARE EDUCATION/TRAINING PROGRAM

## 2022-12-29 LAB
CD3+CD4+ CELLS # BLD: 1265 CELLS/UL (ref 300–1400)
CD3+CD4+ CELLS NFR BLD: 49.5 % (ref 28–57)
HIV1 RNA # SERPL NAA+PROBE: NOT DETECTED COPIES/ML
HIV1 RNA SERPL QL NAA+PROBE: NOT DETECTED

## 2023-01-09 ENCOUNTER — PATIENT MESSAGE (OUTPATIENT)
Dept: PHARMACY | Facility: CLINIC | Age: 33
End: 2023-01-09
Payer: COMMERCIAL

## 2023-01-10 ENCOUNTER — SPECIALTY PHARMACY (OUTPATIENT)
Dept: PHARMACY | Facility: CLINIC | Age: 33
End: 2023-01-10
Payer: COMMERCIAL

## 2023-01-10 NOTE — TELEPHONE ENCOUNTER
Specialty Pharmacy - Refill Coordination    Specialty Medication Orders Linked to Encounter      Flowsheet Row Most Recent Value   Medication #1 abacavir-dolutegravir-lamivud (TRIUMEQ) 600- mg Tab (Order#[id number], Rx#[id number])            Refill Questions - Documented Responses      Flowsheet Row Most Recent Value   Patient Availability and HIPAA Verification    Does patient want to proceed with activity? Yes   HIPAA/medical authority confirmed? Yes   Relationship to patient of person spoken to? Self   Refill Screening Questions    Changes to allergies? No   Changes to medications? No   New conditions since last clinic visit? No   Unplanned office visit, urgent care, ED, or hospital admission in the last 4 weeks? No   How does patient/caregiver feel medication is working? Good   Financial problems or insurance changes? No   How many doses of your specialty medications were missed in the last 4 weeks? 0   Would patient like to speak to a pharmacist? No   When does the patient need to receive the medication? 01/16/23   Refill Delivery Questions    How will the patient receive the medication? Mail   When does the patient need to receive the medication? 01/16/23   Shipping Address Home   Address in [facility] confirmed and updated if neccessary? Yes   Expected Copay ($) 0   Is the patient able to afford the medication copay? Yes   Payment Method zero copay   Days supply of Refill 30   Refill activity completed? Yes   Refill activity plan Refill scheduled   Shipment/Pickup Date: 01/11/23            Current Outpatient Medications   Medication Sig    abacavir-dolutegravir-lamivud (TRIUMEQ) 600- mg Tab Take 1 tablet by mouth once daily.    diclofenac (VOLTAREN) 75 MG EC tablet Take 1 tablet (75 mg total) by mouth 2 (two) times daily as needed (pain).    ergocalciferol (VITAMIN D2) 50,000 unit Cap Take 1 capsule (50,000 Units total) by mouth every 7 days.    linaCLOtide (LINZESS) 72 mcg Cap capsule Take  1 capsule (72 mcg total) by mouth before breakfast.    LORazepam (ATIVAN) 0.5 MG tablet Take half to whole tablet by mouth twice a day as needed for anxiety    paroxetine (PAXIL) 40 MG tablet Take 1 tablet (40 mg total) by mouth every morning.    promethazine (PHENERGAN) 12.5 MG Tab Take 12.5 mg by mouth every 4 (four) hours as needed.    sumatriptan (IMITREX) 100 MG tablet 1 tab oral may repeat every 2 hrs.. Max 2 tabs a day   Last reviewed on 12/23/2022 11:05 AM by Debora Young NP    Review of patient's allergies indicates:   Allergen Reactions    Eggs [egg derived] Nausea Only    Ondansetron Dermatitis    Sulfa (sulfonamide antibiotics) Nausea Only    Sulfamethoxazole-trimethoprim Diarrhea    Last reviewed on  12/23/2022 11:05 AM by Debora Young      Tasks added this encounter   2/8/2023 - Refill Call (Auto Added)   Tasks due within next 3 months   3/6/2023 - Clinical - Follow Up Assesement (Annual)     [facility]  [address] [facility] [address]  Phone: [phone]  Fax: [phone]

## 2023-01-17 ENCOUNTER — OFFICE VISIT (OUTPATIENT)
Dept: FAMILY MEDICINE | Facility: CLINIC | Age: 33
End: 2023-01-17
Payer: COMMERCIAL

## 2023-01-17 ENCOUNTER — HOSPITAL ENCOUNTER (OUTPATIENT)
Dept: RADIOLOGY | Facility: CLINIC | Age: 33
Discharge: HOME | End: 2023-01-17
Attending: INTERNAL MEDICINE
Payer: COMMERCIAL

## 2023-01-17 VITALS
WEIGHT: 184 LBS | HEART RATE: 91 BPM | OXYGEN SATURATION: 99 % | DIASTOLIC BLOOD PRESSURE: 76 MMHG | TEMPERATURE: 98 F | BODY MASS INDEX: 29.57 KG/M2 | RESPIRATION RATE: 16 BRPM | HEIGHT: 66 IN | SYSTOLIC BLOOD PRESSURE: 120 MMHG

## 2023-01-17 DIAGNOSIS — J40 BRONCHITIS: Primary | ICD-10-CM

## 2023-01-17 DIAGNOSIS — R05.9 COUGH IN ADULT PATIENT: ICD-10-CM

## 2023-01-17 DIAGNOSIS — R09.81 SINUS CONGESTION: ICD-10-CM

## 2023-01-17 DIAGNOSIS — J40 BRONCHITIS: ICD-10-CM

## 2023-01-17 LAB
BASOPHILS # BLD AUTO: 0.07 K/UL (ref 0–0.2)
BASOPHILS NFR BLD: 1.1 % (ref 0–1.9)
DIFFERENTIAL METHOD: ABNORMAL
EOSINOPHIL # BLD AUTO: 1 K/UL (ref 0–0.5)
EOSINOPHIL NFR BLD: 15 % (ref 0–8)
ERYTHROCYTE [DISTWIDTH] IN BLOOD BY AUTOMATED COUNT: 12.7 % (ref 11.5–14.5)
HCT VFR BLD AUTO: 40 % (ref 37–48.5)
HGB BLD-MCNC: 13.4 G/DL (ref 12–16)
IMM GRANULOCYTES # BLD AUTO: 0.01 K/UL (ref 0–0.04)
IMM GRANULOCYTES NFR BLD AUTO: 0.2 % (ref 0–0.5)
LYMPHOCYTES # BLD AUTO: 2.8 K/UL (ref 1–4.8)
LYMPHOCYTES NFR BLD: 42.8 % (ref 18–48)
MCH RBC QN AUTO: 31.9 PG (ref 27–31)
MCHC RBC AUTO-ENTMCNC: 33.5 G/DL (ref 32–36)
MCV RBC AUTO: 95 FL (ref 82–98)
MONOCYTES # BLD AUTO: 0.3 K/UL (ref 0.3–1)
MONOCYTES NFR BLD: 4.4 % (ref 4–15)
NEUTROPHILS # BLD AUTO: 2.4 K/UL (ref 1.8–7.7)
NEUTROPHILS NFR BLD: 36.5 % (ref 38–73)
NRBC BLD-RTO: 0 /100 WBC
PLATELET # BLD AUTO: 190 K/UL (ref 150–450)
PMV BLD AUTO: 12.2 FL (ref 9.2–12.9)
RBC # BLD AUTO: 4.2 M/UL (ref 4–5.4)
WBC # BLD AUTO: 6.52 K/UL (ref 3.9–12.7)

## 2023-01-17 PROCEDURE — 99212 OFFICE O/P EST SF 10 MIN: CPT | Mod: S$GLB,,, | Performed by: INTERNAL MEDICINE

## 2023-01-17 PROCEDURE — 3078F DIAST BP <80 MM HG: CPT | Mod: S$GLB,,, | Performed by: INTERNAL MEDICINE

## 2023-01-17 PROCEDURE — 3078F PR MOST RECENT DIASTOLIC BLOOD PRESSURE < 80 MM HG: ICD-10-PCS | Mod: S$GLB,,, | Performed by: INTERNAL MEDICINE

## 2023-01-17 PROCEDURE — 71046 XR CHEST PA AND LATERAL: ICD-10-PCS | Mod: 26,,, | Performed by: RADIOLOGY

## 2023-01-17 PROCEDURE — 70220 X-RAY EXAM OF SINUSES: CPT | Mod: 26,,, | Performed by: RADIOLOGY

## 2023-01-17 PROCEDURE — 3008F PR BODY MASS INDEX (BMI) DOCUMENTED: ICD-10-PCS | Mod: S$GLB,,, | Performed by: INTERNAL MEDICINE

## 2023-01-17 PROCEDURE — 71046 XR CHEST PA AND LATERAL: ICD-10-PCS | Mod: TC,,, | Performed by: INTERNAL MEDICINE

## 2023-01-17 PROCEDURE — 1159F MED LIST DOCD IN RCRD: CPT | Mod: S$GLB,,, | Performed by: INTERNAL MEDICINE

## 2023-01-17 PROCEDURE — 70220 X-RAY EXAM OF SINUSES: CPT | Mod: TC,,, | Performed by: INTERNAL MEDICINE

## 2023-01-17 PROCEDURE — 70220 XR SINUSES MIN 3 VIEWS: ICD-10-PCS | Mod: 26,,, | Performed by: RADIOLOGY

## 2023-01-17 PROCEDURE — 85025 COMPLETE CBC W/AUTO DIFF WBC: CPT | Performed by: INTERNAL MEDICINE

## 2023-01-17 PROCEDURE — 99212 PR OFFICE/OUTPT VISIT, EST, LEVL II, 10-19 MIN: ICD-10-PCS | Mod: S$GLB,,, | Performed by: INTERNAL MEDICINE

## 2023-01-17 PROCEDURE — 3074F SYST BP LT 130 MM HG: CPT | Mod: S$GLB,,, | Performed by: INTERNAL MEDICINE

## 2023-01-17 PROCEDURE — 3008F BODY MASS INDEX DOCD: CPT | Mod: S$GLB,,, | Performed by: INTERNAL MEDICINE

## 2023-01-17 PROCEDURE — 71046 X-RAY EXAM CHEST 2 VIEWS: CPT | Mod: 26,,, | Performed by: RADIOLOGY

## 2023-01-17 PROCEDURE — 70220 XR SINUSES MIN 3 VIEWS: ICD-10-PCS | Mod: TC,,, | Performed by: INTERNAL MEDICINE

## 2023-01-17 PROCEDURE — 3074F PR MOST RECENT SYSTOLIC BLOOD PRESSURE < 130 MM HG: ICD-10-PCS | Mod: S$GLB,,, | Performed by: INTERNAL MEDICINE

## 2023-01-17 PROCEDURE — 71046 X-RAY EXAM CHEST 2 VIEWS: CPT | Mod: TC,,, | Performed by: INTERNAL MEDICINE

## 2023-01-17 PROCEDURE — 1159F PR MEDICATION LIST DOCUMENTED IN MEDICAL RECORD: ICD-10-PCS | Mod: S$GLB,,, | Performed by: INTERNAL MEDICINE

## 2023-01-17 NOTE — PROGRESS NOTES
Subjective:       Patient ID: Veronique Vick is a 32 y.o. female.    Chief Complaint: Cough (Pt presents to the clinic for c/o continued cough and congestion. Pt verbalized taking a covid test yesterday and it was negative. /)    As per the chief complaint.      Patient has not had an elevated temperature.  Rheumatologist mainly localized to the upper respiratory tract with pressure in the forehead mainly on the right side in the zygoma area.  She has also developed a cough that is mainly nonproductive but she says that she can taste blood when she expectorates.  There is no pleurisy associated to same.  There was mild wheezing.  She tried using the albuterol nebulizer therapy prescribed to her son with no significant improvement in symptomatology and in fact he will cause worsening of the headache.  It did respond to Tylenol.    There has been no laura hemoptysis or marked change in her stamina she feels like she does not have the same as prior.  Patient does not have a chronic history of respiratory illnesses.      She is up-to-date with follow-up with infectious disease specialist and in fact will be seeing her this week.  CD4 counts were appropriate on the worse negative viral load appreciated.    Workup trying to rule out the possibility of her gluten enteropathy ordered by me last month was unremarkable.    Augmentin did not improve the symptomatology.    There is no history of trauma to the face.        Cough  This is a new problem. The current episode started more than 1 month ago. The problem has been gradually worsening. The problem occurs hourly. The cough is Productive of blood-tinged sputum. Associated symptoms include ear congestion, headaches, nasal congestion, postnasal drip, shortness of breath and wheezing. Pertinent negatives include no chest pain, chills, ear pain, fever, heartburn, hemoptysis, myalgias, rash, rhinorrhea, sore throat, sweats or weight loss. Nothing aggravates the symptoms.  Risk factors for lung disease include animal exposure. She has tried OTC cough suppressant, body position changes, cool air and rest for the symptoms. The treatment provided no relief. Her past medical history is significant for environmental allergies. There is no history of asthma, bronchiectasis, bronchitis, COPD, emphysema or pneumonia.   Review of Systems   Constitutional:  Negative for chills, fever and weight loss.   HENT:  Positive for postnasal drip. Negative for ear pain, rhinorrhea and sore throat.    Respiratory:  Positive for cough, shortness of breath and wheezing. Negative for hemoptysis.    Cardiovascular:  Negative for chest pain.   Gastrointestinal:  Negative for heartburn.   Musculoskeletal:  Negative for myalgias.   Integumentary:  Negative for rash.   Allergic/Immunologic: Positive for environmental allergies.   Neurological:  Positive for headaches.   All other systems reviewed and are negative.      Objective:      Physical Exam  Vitals and nursing note reviewed.   Constitutional:       General: She is not in acute distress.     Appearance: Normal appearance. She is normal weight. She is not ill-appearing, toxic-appearing or diaphoretic.   HENT:      Head: Normocephalic and atraumatic.      Right Ear: Tympanic membrane, ear canal and external ear normal. There is no impacted cerumen.      Left Ear: Tympanic membrane, ear canal and external ear normal. There is no impacted cerumen.      Nose: Congestion present. No rhinorrhea.      Mouth/Throat:      Mouth: Mucous membranes are moist.      Pharynx: Oropharynx is clear. No oropharyngeal exudate or posterior oropharyngeal erythema.   Eyes:      General: No scleral icterus.        Right eye: No discharge.      Extraocular Movements: Extraocular movements intact.      Conjunctiva/sclera: Conjunctivae normal.      Pupils: Pupils are equal, round, and reactive to light.   Cardiovascular:      Rate and Rhythm: Normal rate and regular rhythm.    Pulmonary:      Effort: Pulmonary effort is normal. No respiratory distress.      Breath sounds: Normal breath sounds. No stridor. No wheezing, rhonchi or rales.   Chest:      Chest wall: No tenderness.   Skin:     General: Skin is warm and dry.      Coloration: Skin is not jaundiced or pale.   Neurological:      General: No focal deficit present.      Mental Status: She is alert and oriented to person, place, and time. Mental status is at baseline.      Cranial Nerves: No cranial nerve deficit.      Sensory: No sensory deficit.      Motor: No weakness.      Coordination: Coordination normal.      Gait: Gait normal.   Psychiatric:         Mood and Affect: Mood normal.         Behavior: Behavior normal.         Thought Content: Thought content normal.         Judgment: Judgment normal.       Assessment:       Problem List Items Addressed This Visit    None  Visit Diagnoses       Bronchitis    -  Primary    Cough in adult patient        Sinus congestion                  Plan:       Because this protracted cough with low-grade temperature and patient's history plans are for her to have chest x-ray AP and lateral and sinus series at the [facility].  Which should have this report within an hour or so after the tests are completed and plans are to be modified accordingly.  For now no empiric antibiotic therapy is consider.    The significant paranasal sinus findings then she will be referred to the ear nose and throat physician for further evaluation and therapy.      Patient has a follow-up appointment with her infectious disease specialist this week on the lab work obtained on December 28 pertaining to her follow-up is benign.  Next para vital signs remained stable.      No abnormalities with regards to auscultation of the chest pain    Medication refills are issued today.

## 2023-01-18 ENCOUNTER — OFFICE VISIT (OUTPATIENT)
Dept: INFECTIOUS DISEASES | Facility: CLINIC | Age: 33
End: 2023-01-18
Payer: COMMERCIAL

## 2023-01-18 VITALS
WEIGHT: 189.81 LBS | OXYGEN SATURATION: 98 % | HEART RATE: 76 BPM | TEMPERATURE: 98 F | DIASTOLIC BLOOD PRESSURE: 78 MMHG | BODY MASS INDEX: 30.63 KG/M2 | SYSTOLIC BLOOD PRESSURE: 118 MMHG

## 2023-01-18 DIAGNOSIS — Z79.899 ENCOUNTER FOR LONG-TERM (CURRENT) USE OF MEDICATIONS: ICD-10-CM

## 2023-01-18 DIAGNOSIS — D72.10 EOSINOPHILIA, UNSPECIFIED TYPE: ICD-10-CM

## 2023-01-18 DIAGNOSIS — B20 HIV INFECTION, UNSPECIFIED SYMPTOM STATUS: Primary | ICD-10-CM

## 2023-01-18 DIAGNOSIS — E55.9 VITAMIN D DEFICIENCY: ICD-10-CM

## 2023-01-18 DIAGNOSIS — J30.9 ALLERGIC RHINITIS, UNSPECIFIED SEASONALITY, UNSPECIFIED TRIGGER: ICD-10-CM

## 2023-01-18 PROCEDURE — 1160F PR REVIEW ALL MEDS BY PRESCRIBER/CLIN PHARMACIST DOCUMENTED: ICD-10-PCS | Mod: CPTII,S$GLB,, | Performed by: INTERNAL MEDICINE

## 2023-01-18 PROCEDURE — 1159F MED LIST DOCD IN RCRD: CPT | Mod: CPTII,S$GLB,, | Performed by: INTERNAL MEDICINE

## 2023-01-18 PROCEDURE — 3074F SYST BP LT 130 MM HG: CPT | Mod: CPTII,S$GLB,, | Performed by: INTERNAL MEDICINE

## 2023-01-18 PROCEDURE — 3008F PR BODY MASS INDEX (BMI) DOCUMENTED: ICD-10-PCS | Mod: CPTII,S$GLB,, | Performed by: INTERNAL MEDICINE

## 2023-01-18 PROCEDURE — 99214 OFFICE O/P EST MOD 30 MIN: CPT | Mod: S$GLB,,, | Performed by: INTERNAL MEDICINE

## 2023-01-18 PROCEDURE — 3078F PR MOST RECENT DIASTOLIC BLOOD PRESSURE < 80 MM HG: ICD-10-PCS | Mod: CPTII,S$GLB,, | Performed by: INTERNAL MEDICINE

## 2023-01-18 PROCEDURE — 3074F PR MOST RECENT SYSTOLIC BLOOD PRESSURE < 130 MM HG: ICD-10-PCS | Mod: CPTII,S$GLB,, | Performed by: INTERNAL MEDICINE

## 2023-01-18 PROCEDURE — 1159F PR MEDICATION LIST DOCUMENTED IN MEDICAL RECORD: ICD-10-PCS | Mod: CPTII,S$GLB,, | Performed by: INTERNAL MEDICINE

## 2023-01-18 PROCEDURE — 1160F RVW MEDS BY RX/DR IN RCRD: CPT | Mod: CPTII,S$GLB,, | Performed by: INTERNAL MEDICINE

## 2023-01-18 PROCEDURE — 99214 PR OFFICE/OUTPT VISIT, EST, LEVL IV, 30-39 MIN: ICD-10-PCS | Mod: S$GLB,,, | Performed by: INTERNAL MEDICINE

## 2023-01-18 PROCEDURE — 3008F BODY MASS INDEX DOCD: CPT | Mod: CPTII,S$GLB,, | Performed by: INTERNAL MEDICINE

## 2023-01-18 PROCEDURE — 3078F DIAST BP <80 MM HG: CPT | Mod: CPTII,S$GLB,, | Performed by: INTERNAL MEDICINE

## 2023-01-18 NOTE — PROGRESS NOTES
Subjective:       Patient ID: Veronique Vick is a 32 y.o. female.    Chief Complaint::  B20    Her Source of exposure learned that he was positive last November and was on treatment but did not reveal his diagnosis until after their last encounter in April 2020.   Her last test was probably during pregnancy for her 2 year old who exhibits no signs of infection .   Recent serologies were reviewed.   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 The Genosure prime was not completed and will be redrawn. We reviewed the [facility] guidelines for treatment in pregnant women and have come to a tentative agreement to use Triumeq. She learned that the source of her infection was on Biktarvy at the time.    12/7/20: tolerating triumeq, as long as she takes it with food. I have not received records from prior GYN, but can see a fair amount in care everywhere. She has 4 month pap repeated this week. She took her flu vaccine. Having anxiety, regarding her ex , requests something for anxiety, non habit forming. She agrees to Buspar.     3/8/21: tolerating triumeq, its a large tablet. Has a significant other, thinking about getting . Yevgeniy. Thinking about having children.  She is interested in him receiving prep.  She is taking herself in her partner.  She understands that she will see a maternal/fetal medicine. Her OB is Dr. Fields and she would deliver at [facility]    7/12/21: interim reviewed. Reviewed lab 6/2021 lab.  Doing extremely well.  Having a good weight loss, dieting, exercising    1/13/22: interim reviewed. PAP still abnormal but HPV negative. Reviewed dec lab.  Had zoster and  brief neuralgia left thoracic, under a very stressful circumstance. Resolved with valtrex.  7/20/22: doing well. Had a mild UTI 2 weeks ago. Had some LBP which is resolved.    1/18/23: well in general. For about a month, has been having a cough and wheezing. No history of asthma. Her sister has a new dog and she suspects that this may be the source of allergen. She is taking benadryl at night with some improvement. She gave herself 10d of augmentin without benefit. Has not used a nasal steroid. She has budesonide nasal spray at home.      CXR sinus xray and cbc are fine except 15% eosinophils. No rashes and no diarrhea  Interested in artificial insemination now. Having abnormal PAPs associated with prior HPV. No recent signs of HPV just low grade dysplasia. She sees Dr. Fields.     Current Outpatient Medications:     abacavir-dolutegravir-lamivud (TRIUMEQ) 600- mg Tab, Take 1 tablet by mouth once daily., Disp: 90 tablet, Rfl: 1    LORazepam (ATIVAN) 0.5 MG tablet, Take half to whole tablet by mouth twice a day as needed for anxiety, Disp: 20 tablet, Rfl: 0    paroxetine (PAXIL) 40 MG tablet, Take 1 tablet (40 mg total) by mouth every morning., Disp: 30 tablet, Rfl: 11    promethazine (PHENERGAN) 12.5 MG Tab, Take 12.5 mg by mouth every 4 (four) hours as needed., Disp: , Rfl:     sumatriptan (IMITREX) 100 MG tablet, 1 tab oral may repeat every 2 hrs.. Max 2 tabs a day, Disp: 9 tablet, Rfl: 3  Review of patient's allergies indicates:   Allergen Reactions    Eggs [egg derived] Nausea Only    Ondansetron Dermatitis    Sulfa (sulfonamide antibiotics) Nausea Only    Sulfamethoxazole-trimethoprim Diarrhea     Past Medical History:   Diagnosis Date    Wrist fracture     left   abnormal PAP    Multiple colposcopies  Past Surgical History:   Procedure Laterality Date    ORIF RADIUS & ULNA FRACTURES Left 2016    WISDOM TOOTH EXTRACTION       Social History     Socioeconomic History    Marital status: Single   Tobacco Use     Smoking status: Never    Smokeless tobacco: Never   Substance and Sexual Activity    Alcohol use: Yes     Alcohol/week: 0.0 standard drinks     Comment: social    Drug use: No    Sexual activity: Yes     Partners: Male     Birth control/protection: I.U.D.     Social Determinants of Health     Financial Resource Strain: Low Risk     Difficulty of Paying Living Expenses: Not hard at all   Food Insecurity: No Food Insecurity    Worried About Running Out of Food in the Last Year: Never true    Ran Out of Food in the Last Year: Never true   Transportation Needs: No Transportation Needs    Lack of Transportation (Medical): No    Lack of Transportation (Non-Medical): No   Physical Activity: Insufficiently Active    Days of Exercise per Week: 3 days    Minutes of Exercise per Session: 30 min   Stress: Stress Concern Present    Feeling of Stress : Very much   Social Connections: Unknown    Frequency of Communication with Friends and Family: More than three times a week    Frequency of Social Gatherings with Friends and Family: More than three times a week    Active Member of Clubs or Organizations: No    Attends Club or Organization Meetings: Never    Marital Status: Never    Housing Stability: Low Risk     Unable to Pay for Housing in the Last Year: No    Number of Places Lived in the Last Year: 1    Unstable Housing in the Last Year: No     Family History   Problem Relation Age of Onset    Hyperlipidemia Father     Hypertension Father        Travel History: [place], [place], [place](cruise)  Vaccine History: Hep B series (6th grade), Tdap 2/2018, yearly flu vaccine(egg allergy), zostavax 2013, COVID x 3 2021, menactra #1 2022,   Advanced Directive:   Safer Sex:  Sig other, protected  Gyn: (Dr. Fields) UTD, repeat PAP 4/2022, 10/22 abnormal but improved  Mammogram:  Bone Density:   Colonoscopy:    Review of Systems      Constitutional: No fever, chills, sweats, fatigue, weakness, weight loss.      Eyes: No change in  vision, loss of vision,  . UTD eye exam    ENT: No sinus drainage, sore throat, mouth pain, or lesions. UTD dental exam    Cardiovascular: No chest pain, CANNON, palpitations or pedal edema    Respiratory: No shortness of breath, CANNON, cough,       Gastrointestinal: No abdominal pain, nausea, vomiting, diarrhea,   Regular with linzess    Genitourinary: No dysuria, hematuria, , or urethritis    Musculoskeletal: No new pain, joint swelling, or injuries    Integumentary: No new rashes, lesions, or wounds    Neurological: No dizziness, vertigo, unusual headaches, neuropathy, or falls    Psychiatric:  No anxiety, depression, no memory loss,   or substance abuse. Lots of custody montanez stress    Endocrine: not diabetic.  Thyroid normal    Lymphatic: No lymphadenopathy, blood loss, anemia, or malignancy    Objective:      Blood pressure 118/78, pulse 76, temperature 98.3 °F (36.8 °C), weight 86.1 kg (189 lb 12.8 oz), SpO2 98 %. Body mass index is 30.63 kg/m².  Physical Exam    126/96    General: Alert and attentive, cooperative and in no distress    Eyes: Pupils equal, round, reactive to light, anicteric, EOMI    Neck: Supple, non-tender, no thyromegaly or masses    ENT: EAC patent, TM normal, nares patent, no oral lesions, teeth in good condition, no thrush    Cardiovascular: Regular rate and rhythm, no murmurs, rubs, or gallop    Respiratory: Lungs clear without wheezes, no rales, rub or rhonci    Gastrointestinal:  Soft, nontender, no masses, no guarding, bowel sounds normal    Genitourinary:  No flank tenderness    Vascular: No peripheral edema, phlebitis, pulses normal. Warm and well perfused    Musculoskeletal: Ambulates without difficulty, no acute arthritis, synovitis or myositis. Normal muscle bulk and strength    Integumentary: Skin without rashes, lesions, or wounds    AnusRectum:      Neurological: Normal LOC, cranial nerves, speech, reflexes, normal gait    Psychiatric: Normal mood, speech, demeanor    Lymphatic:  No cervical, supraclavicular, axillary  or inguinal lymphadenopathy      Wound:     HIV Table:   Toxo IgG  8/17/20 positive  [id number]  8/17/20 negative  RPR   6/15/21 Non reactive  Hepatitis A IgG 8/12/20 negative  Hepatitis B sAg 8/12/20 negative  Hepatitis B sAb 8/12/20 positive  Hepatitis B cAb 8/12/20 negative  Hepatitis C Ab  7/21/22 negative  Chl/GC  Vitamin D  7/21/22 33, low  TB gold  7/21/22 negative  Genotype  9/23/20  genotypr plus and integrase all neg  CD4 initial  8/17/20 Over 1000  Initial RNA  8/17/20 1500      Recent Diagnostics: reviewed all of the lab results       Assessment and Plan:           HIV infection, unspecified symptom status    Encounter for long-term (current) use of medications    Vitamin D deficiency  -     Vitamin D; Future; Expected date: 01/18/2023    Allergic rhinitis, unspecified seasonality, unspecified trigger    Eosinophilia, unspecified type  -     Strongyloides IgG Antibodies; Future; Expected date: 01/18/2023  -     CBC Auto Differential; Future; Expected date: 01/18/2023            Same medications  Repeat CBC, Strongyloides Ab and vitamin D level at your convenience  Try budesonide nasal spray  If this is ineffective, consider singulair 10 mg hina    Follow up 6 months     This note was created using Dragon voice recognition software that occasionally misinterpreted phrases or words.

## 2023-01-18 NOTE — PATIENT INSTRUCTIONS
Same medications  Repeat CBC, Strongyloides Ab and vitamin D level at your convenience  Try budesonide nasal spray  If this is ineffective, consider singulair 10 mg hina    Follow up 6 months

## 2023-02-03 ENCOUNTER — LAB VISIT (OUTPATIENT)
Dept: LAB | Facility: CLINIC | Age: 33
End: 2023-02-03
Payer: COMMERCIAL

## 2023-02-03 DIAGNOSIS — E55.9 VITAMIN D DEFICIENCY: ICD-10-CM

## 2023-02-03 DIAGNOSIS — D72.10 EOSINOPHILIA, UNSPECIFIED TYPE: ICD-10-CM

## 2023-02-03 LAB
BASOPHILS # BLD AUTO: 0.07 K/UL (ref 0–0.2)
BASOPHILS NFR BLD: 1 % (ref 0–1.9)
DIFFERENTIAL METHOD: ABNORMAL
EOSINOPHIL # BLD AUTO: 0.5 K/UL (ref 0–0.5)
EOSINOPHIL NFR BLD: 7.1 % (ref 0–8)
ERYTHROCYTE [DISTWIDTH] IN BLOOD BY AUTOMATED COUNT: 12.4 % (ref 11.5–14.5)
HCT VFR BLD AUTO: 38.1 % (ref 37–48.5)
HGB BLD-MCNC: 13 G/DL (ref 12–16)
IMM GRANULOCYTES # BLD AUTO: 0 K/UL (ref 0–0.04)
IMM GRANULOCYTES NFR BLD AUTO: 0 % (ref 0–0.5)
LYMPHOCYTES # BLD AUTO: 3.2 K/UL (ref 1–4.8)
LYMPHOCYTES NFR BLD: 46.4 % (ref 18–48)
MCH RBC QN AUTO: 31.9 PG (ref 27–31)
MCHC RBC AUTO-ENTMCNC: 34.1 G/DL (ref 32–36)
MCV RBC AUTO: 93 FL (ref 82–98)
MONOCYTES # BLD AUTO: 0.4 K/UL (ref 0.3–1)
MONOCYTES NFR BLD: 6.1 % (ref 4–15)
NEUTROPHILS # BLD AUTO: 2.7 K/UL (ref 1.8–7.7)
NEUTROPHILS NFR BLD: 39.4 % (ref 38–73)
NRBC BLD-RTO: 0 /100 WBC
PLATELET # BLD AUTO: 194 K/UL (ref 150–450)
PMV BLD AUTO: 11.7 FL (ref 9.2–12.9)
RBC # BLD AUTO: 4.08 M/UL (ref 4–5.4)
WBC # BLD AUTO: 6.92 K/UL (ref 3.9–12.7)

## 2023-02-03 PROCEDURE — 86682 HELMINTH ANTIBODY: CPT | Performed by: INTERNAL MEDICINE

## 2023-02-03 PROCEDURE — 82306 VITAMIN D 25 HYDROXY: CPT | Performed by: INTERNAL MEDICINE

## 2023-02-03 PROCEDURE — 85025 COMPLETE CBC W/AUTO DIFF WBC: CPT | Performed by: INTERNAL MEDICINE

## 2023-02-04 LAB — 25(OH)D3+25(OH)D2 SERPL-MCNC: 26 NG/ML (ref 30–96)

## 2023-02-06 LAB — STRONGYLOIDES ANTIBODY IGG: NEGATIVE

## 2023-02-07 ENCOUNTER — OFFICE VISIT (OUTPATIENT)
Dept: OBSTETRICS AND GYNECOLOGY | Facility: CLINIC | Age: 33
End: 2023-02-07
Payer: COMMERCIAL

## 2023-02-07 VITALS — WEIGHT: 194 LBS | HEIGHT: 66 IN | RESPIRATION RATE: 18 BRPM | BODY MASS INDEX: 31.18 KG/M2

## 2023-02-07 DIAGNOSIS — Z31.69 PRE-CONCEPTION COUNSELING: ICD-10-CM

## 2023-02-07 DIAGNOSIS — R87.618 OTHER ABNORMAL CYTOLOGICAL FINDING OF SPECIMEN FROM CERVIX: Primary | ICD-10-CM

## 2023-02-07 PROCEDURE — 99213 OFFICE O/P EST LOW 20 MIN: CPT | Mod: S$GLB,,, | Performed by: OBSTETRICS & GYNECOLOGY

## 2023-02-07 PROCEDURE — 99213 PR OFFICE/OUTPT VISIT, EST, LEVL III, 20-29 MIN: ICD-10-PCS | Mod: S$GLB,,, | Performed by: OBSTETRICS & GYNECOLOGY

## 2023-02-07 PROCEDURE — 3008F PR BODY MASS INDEX (BMI) DOCUMENTED: ICD-10-PCS | Mod: CPTII,S$GLB,, | Performed by: OBSTETRICS & GYNECOLOGY

## 2023-02-07 PROCEDURE — 3008F BODY MASS INDEX DOCD: CPT | Mod: CPTII,S$GLB,, | Performed by: OBSTETRICS & GYNECOLOGY

## 2023-02-07 PROCEDURE — 88141 PR  CYTOPATH CERV/VAG INTERPRET: ICD-10-PCS | Mod: ,,, | Performed by: PATHOLOGY

## 2023-02-07 PROCEDURE — 99999 PR PBB SHADOW E&M-EST. PATIENT-LVL III: ICD-10-PCS | Mod: PBBFAC,,, | Performed by: OBSTETRICS & GYNECOLOGY

## 2023-02-07 PROCEDURE — 88175 CYTOPATH C/V AUTO FLUID REDO: CPT | Performed by: PATHOLOGY

## 2023-02-07 PROCEDURE — 88141 CYTOPATH C/V INTERPRET: CPT | Mod: ,,, | Performed by: PATHOLOGY

## 2023-02-07 PROCEDURE — 99999 PR PBB SHADOW E&M-EST. PATIENT-LVL III: CPT | Mod: PBBFAC,,, | Performed by: OBSTETRICS & GYNECOLOGY

## 2023-02-07 RX ORDER — ERGOCALCIFEROL 1.25 MG/1
50000 CAPSULE ORAL
Qty: 12 CAPSULE | Refills: 3 | Status: SHIPPED | OUTPATIENT
Start: 2023-02-07 | End: 2024-02-05 | Stop reason: SDUPTHER

## 2023-02-07 NOTE — PROGRESS NOTES
"Chief Complaint   Patient presents with    Repeat Pap        History of Present Illness   32 y.o.  female   patient presents today for repeat PAP, planned IUI - req MFM consult.   PAP= LGSIL 2020, neg colpo  PAP #1 2020 = WNL (new dx HIV)  PAP #2= 21= ASCUS  PAP #3= 2021= ASCUS w pos HPV  PAP#4= 21= lgsil  Liletta IUD placed 2020  PAP #5= 2022 ASCUS w neg HPV  PAP #6= 2022, ascus w neg HR-HPV  PAP #7(23)= today    Past medical and surgical history reviewed.   I have reviewed the patient's medical history in detail and updated the computerized patient record.    Review of patient's allergies indicates:   Allergen Reactions    Eggs [egg derived] Nausea Only    Ondansetron Dermatitis    Sulfa (sulfonamide antibiotics) Nausea Only    Sulfamethoxazole-trimethoprim Diarrhea         Review of Systems - Negative except HPI  GEN ROS: negative for - chills or fever  Breast ROS: negative for breast lumps  Genito-Urinary ROS: no dysuria, trouble voiding, or hematuria      Physical Examination:  Resp 18   Ht 5' 6" (1.676 m)   Wt 88 kg (194 lb 0.1 oz)   LMP 2023   BMI 31.31 kg/m²    Constitutional: She appears alert and responsive. She appears well-developed, well-groomed, and well-nourished. No distress. OverWeight   HENT:   Head: Normocephalic and atraumatic.   Eyes: Conjunctivae and EOM are normal. No scleral icterus.   Neck: Symmetrical. Normal range of motion. Neck supple. No tracheal deviation present. THYROID:  without masses or tenderness.  Cardiovascular: Normal rate, no rhythm abnormality noted. Extremities without swelling or edema, warm.    Pulmonary/Chest: Normal respiratory Effort. No distress or retractions. She exhibits no tenderness  Abdominal: Soft. She exhibits no distension, hernias or masses. There is no tenderness. No enlargement of liver edge or spleen.  There is no rebound and no guarding.   Genitourinary:    External rectal exam shows no thrombosed " external hemorrhoids, no lesions.     Pelvic exam was performed with patient supine.   No labial fusion, and symmetrical.    There is no rash, lesion or injury on the right labia.   There is no rash, lesion or injury on the left labia.   No bleeding and no signs of injury around the vaginal introitus, urethral meatus is normal size and without prolapse or lesions, urethra well supported. The cervix is visualized with no discharge, lesions or friability.   No vaginal discharge found.    No significant Cystocele, Enterocele or rectocele, and cervix and uterus well supported.   Bimanual exam:   The urethra is normal to palpation and there are no palpable vaginal wall masses.   Uterus is not deviated, not enlarged, not fixed, normal shape and not tender.   Cervix exhibits no motion tenderness.    Right adnexum displays no mass or nodularity and no tenderness.   Left adnexum displays no mass or nodularity and no tenderness.  Musculoskeletal: Normal range of motion.   Lymphadenopathy: No inguinal adenopathy present.   Neurological: She is alert and oriented to person, place, and time. Coordination normal.   Skin: Skin is warm and dry. She is not diaphoretic. No rashes, lesions or ulcers.   Psychiatric: She has a normal mood and affect, oriented to person, place, and time.            Assessment:  PAP dx done today  1. Other abnormal cytological finding of specimen from cervix  Liquid-Based Pap Smear, Diagnostic    Ambulatory referral/consult to Perinatology      2. Pre-conception counseling  Ambulatory referral/consult to Perinatology          Plan:  PAP  Follow up 4 months  [facility] consult   Patient informed will be contacted with results within 2 weeks. Encouraged to please call back or email if she has not heard from us by then.

## 2023-02-08 ENCOUNTER — SPECIALTY PHARMACY (OUTPATIENT)
Dept: PHARMACY | Facility: CLINIC | Age: 33
End: 2023-02-08
Payer: COMMERCIAL

## 2023-02-08 NOTE — TELEPHONE ENCOUNTER
Specialty Pharmacy - Refill Coordination    Specialty Medication Orders Linked to Encounter      Flowsheet Row Most Recent Value   Medication #1 abacavir-dolutegravir-lamivud (TRIUMEQ) 600- mg Tab (Order#[id number], Rx#[id number])            Refill Questions - Documented Responses      Flowsheet Row Most Recent Value   Patient Availability and HIPAA Verification    Does patient want to proceed with activity? Yes   HIPAA/medical authority confirmed? Yes   Relationship to patient of person spoken to? Self   Refill Screening Questions    Changes to allergies? No   Changes to medications? No   New conditions since last clinic visit? No   Unplanned office visit, urgent care, ED, or hospital admission in the last 4 weeks? No   How does patient/caregiver feel medication is working? Good   Financial problems or insurance changes? No   How many doses of your specialty medications were missed in the last 4 weeks? 0   Would patient like to speak to a pharmacist? No   When does the patient need to receive the medication? 02/18/23   Refill Delivery Questions    How will the patient receive the medication? [facility]   When does the patient need to receive the medication? 02/18/23   Shipping Address Home   Address in [facility] confirmed and updated if neccessary? Yes   Expected Copay ($) 0   Is the patient able to afford the medication copay? Yes   Payment Method zero copay   Days supply of Refill 30   Supplies needed? No supplies needed   Refill activity completed? Yes   Refill activity plan Refill scheduled   Shipment/Pickup Date: 02/13/23            Current Outpatient Medications   Medication Sig    abacavir-dolutegravir-lamivud (TRIUMEQ) 600- mg Tab Take 1 tablet by mouth once daily.    ergocalciferol (ERGOCALCIFEROL) 50,000 unit Cap Take 1 capsule (50,000 Units total) by mouth every 7 days.    LORazepam (ATIVAN) 0.5 MG tablet Take half to whole tablet by mouth twice a day as needed for anxiety    paroxetine  (PAXIL) 40 MG tablet Take 1 tablet (40 mg total) by mouth every morning.    promethazine (PHENERGAN) 12.5 MG Tab Take 12.5 mg by mouth every 4 (four) hours as needed.    sumatriptan (IMITREX) 100 MG tablet 1 tab oral may repeat every 2 hrs.. Max 2 tabs a day   Last reviewed on 1/18/2023  9:31 AM by Edelmira Ledesma MD    Review of patient's allergies indicates:   Allergen Reactions    Eggs [egg derived] Nausea Only    Ondansetron Dermatitis    Sulfa (sulfonamide antibiotics) Nausea Only    Sulfamethoxazole-trimethoprim Diarrhea    Last reviewed on  2/7/2023 8:58 AM by Lolly Centeno      Tasks added this encounter   No tasks added.   Tasks due within next 3 months   3/6/2023 - Clinical - Follow Up Assesement (Annual)  2/8/2023 - Refill Call (Auto Added)     JOSE ENRIQUE TAN, PharmD  [facility]  [address]  [facility] [address]  Phone: [phone]  Fax: [phone]

## 2023-02-15 LAB
FINAL PATHOLOGIC DIAGNOSIS: ABNORMAL
Lab: ABNORMAL

## 2023-02-16 ENCOUNTER — TELEPHONE (OUTPATIENT)
Dept: OBSTETRICS AND GYNECOLOGY | Facility: CLINIC | Age: 33
End: 2023-02-16
Payer: COMMERCIAL

## 2023-02-16 NOTE — TELEPHONE ENCOUNTER
----- Message from Yevgeniy Fields MD sent at 2/16/2023  8:08 AM CST -----  Regarding: abnormal PAP  Recurrent low grade dysplasia on PAP.  Recommend repeat colposcope evaluation and biopsy prior to pregnancy as planned.

## 2023-02-16 NOTE — TELEPHONE ENCOUNTER
Spoke with  patient. She is trying to get pregnant within a week. Her cycle should start on tomorrow and last for a few days, she has work right now and can't take off. She will inseminate soon and will let us know if she doesn't get pregnant to do the colposcopy. Informed patient will let  know.

## 2023-02-24 ENCOUNTER — OFFICE VISIT (OUTPATIENT)
Dept: MATERNAL FETAL MEDICINE | Facility: CLINIC | Age: 33
End: 2023-02-24
Attending: OBSTETRICS & GYNECOLOGY
Payer: COMMERCIAL

## 2023-02-24 DIAGNOSIS — R87.618 OTHER ABNORMAL CYTOLOGICAL FINDING OF SPECIMEN FROM CERVIX: ICD-10-CM

## 2023-02-24 DIAGNOSIS — Z31.69 PRE-CONCEPTION COUNSELING: ICD-10-CM

## 2023-02-24 PROCEDURE — 99204 PR OFFICE/OUTPT VISIT, NEW, LEVL IV, 45-59 MIN: ICD-10-PCS | Mod: 95,,, | Performed by: OBSTETRICS & GYNECOLOGY

## 2023-02-24 PROCEDURE — 99204 OFFICE O/P NEW MOD 45 MIN: CPT | Mod: 95,,, | Performed by: OBSTETRICS & GYNECOLOGY

## 2023-02-24 NOTE — PROGRESS NOTES
Telemedicine visit    The patient location is: remote location   [facility] physician location: [facility] clinic  [facility] physician: Dr. Weir  The chief complaint leading to consultation is: HIV condition and desires future fertility  Visit type: telemedicine via audio and video using So1 technology    Face to Face time with patient: about 25 minutes  About 45 minutes of total time spent on the encounter, which includes face to face time and non-face to face time preparing to see the patient (eg, review of tests), Obtaining and/or reviewing separately obtained history, Documenting clinical information in the electronic or other health record, Independently interpreting results (not separately reported) and communicating results to the patient/family/caregiver, or Care coordination (not separately reported).     Each patient to whom he or she provides medical services by telemedicine is:  (1) informed of the relationship between the physician and patient and the respective role of any other health care provider with respect to management of the patient; and (2) notified that he or she may decline to receive medical services by telemedicine and may withdraw from such care at any time.    HIV:  Management per [facility] Dr. Joubert   Recent viral load non detectable and CD4 1265 on 12-28-22   Meds: Triumeq   Her Source of exposure was her ex partner and he did not reveal his diagnosis until after their last encounter in April 2020.   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 We discussed the risks of  vertical transmission as relates to patient's compliance with ART and viral load. We defer selection and continuation of ART therapy to the patient's infectious disease specialist as genotyping and baseline laboratory studies should be assessed prior to initiation. The site: https:hivinfo.nih.gov lists the up to date recommendations regarding efficacy and potential risks of medications in pregnancy. Patient's regimen is Triumeq which is a maternal benefit vs. Potential fetal risks.     Patient reports up to date on all her vaccinations.     Patient was counseled on recommendations that include those listed below     Recommendations  for Primary OB:  Ensure vaccinations are up to date: influenza, hepatitis A, hepatitis B, pneumovax, and TDAP. If COVID vaccine deemed appropriate by ID, offer as able  Continue care with ID subspecialist; defer baseline laboratory evaluation, prophylaxis for opportunistic infections, genotyping, and ART regimen to ID  Continue current regimen Triumeq as per Patient's ID specialist: I will reach out to her as to medication and future pregnancy   Health maintenance: pap smear, TB screen, and STI testing per ID or OB   Amniocentesis has previously been avoided in the past. If this is considered during pregnancy it would be recommended to at least have an undetectable viral load before pursuing.   Consider early GDM screening if patient on protease inhibitor and repeat at 24-28 weeks during pregnancy  ultrasounds during pregnancy at 28 and 34 weeks to monitor for fetal growth restriction  Viral load (VL) monitoring during pregnancy:  Monitor monthly until undetectable  If VL undetectable, monitor every trimester, at 36 weeks, and upon admission to L&D  Monitor CD4 count every trimester if < 300  Delivery:  If VL is > 1000 copies/mL prior to 38 weeks', perform pre-labor C/S at 38 weeks and 0 days to reduce risk of vertical transmission  If VL is ? 1000 copies/mL and patient is on HAART, C/S  is typically reserved for obstetric indications only and delivery can take place at or beyond 39 weeks gestation (unless an indication arises earlier)  Avoid use of fetal scalp electrode  AROM and operative vaginal delivery per obstetric indication, but avoid if possible if viral load ? 50 copies/mL  Continue ART regimen during L&D. [facility] recommends administration of intrapartum Zidovudine (ZDV) to all patients with HIV prior to  or during vaginal delivery, regardless of VL. Goal is to administer at least 3 hours of ZDV prior to delivery. Dose is 1-hour loading dose at 2 mg/kg followed by continuous infusion of 1 mg/kg/hr until delivery.  Avoid methergine with certain protease inhibitors.  Notify pediatrics so that appropriate postexposure prophylaxis and HIV testing is performed.  Patient to be counseled re: condom use; partner should be evaluated for PrEP.  Postpartum care should include contraceptive counseling (LARC should be considered), continued compliance with ID/ART therapy, and monitoring for peripartum mood disorders. Patient should not breastfeed.  If patient has a viral load of >1000 copies and presents with ROM or labor, you may call [phone] for HIV expert to help develop an individualized delivery plan.     Hx of HPV:  -abnormal pap smear 23:  Low-grade squamous intraepithelial lesion.    Patient advised to have evaluation of cervix as per Dr. Fields prior to future fertility. She was counseled on pregnancy management of cervical length assessment if LEEP procedure or CKC performed. Patient's questions were answered.     Depression:  Meds: Ativan, Paxil     Patient counseled on seeing her Psychiatrist about changing her Paxil to another medication to treat her condition since Paxil is not recommended during pregnancy secondary to risk to fetus in particular for fetal cardiac malformations. Patient advised to have psychiatry evaluation/management prior to pregnancy in particular  evaluation of Paxil to have changed to another medication per Psychiatry prior to pregnancy secondary to fetal risks.     Patient's questions were answered.

## 2023-03-02 ENCOUNTER — PATIENT MESSAGE (OUTPATIENT)
Dept: MATERNAL FETAL MEDICINE | Facility: CLINIC | Age: 33
End: 2023-03-02
Payer: COMMERCIAL

## 2023-03-05 ENCOUNTER — PATIENT MESSAGE (OUTPATIENT)
Dept: INFECTIOUS DISEASES | Facility: CLINIC | Age: 33
End: 2023-03-05

## 2023-03-10 ENCOUNTER — SPECIALTY PHARMACY (OUTPATIENT)
Dept: PHARMACY | Facility: CLINIC | Age: 33
End: 2023-03-10
Payer: COMMERCIAL

## 2023-03-10 DIAGNOSIS — B20 HUMAN IMMUNODEFICIENCY VIRUS (HIV) DISEASE: Primary | ICD-10-CM

## 2023-03-10 NOTE — TELEPHONE ENCOUNTER
Specialty Pharmacy - Refill Coordination  Specialty Pharmacy - Clinical Reassessment    Specialty Medication Orders Linked to Encounter      Flowsheet Row Most Recent Value   Medication #1 abacavir-dolutegravir-lamivud (TRIUMEQ) 600- mg Tab (Order#[id number], Rx#[id number])          Patient Diagnosis   B20 - Human immunodeficiency virus (HIV) disease    Veronique Vick is a 32 y.o. female, who is followed by the specialty pharmacy service for management and education of her HIV.  She has been on therapy with Triumeq since 10/2020 at [facility].  I have reviewed her electronic medical record and current medication list and determined that specialty medication adjustment Is not needed at this time.    Patient has not experienced adverse events.  She Is adherent reporting 0 missed doses since last review.  Adherence has been encouraged with the following mechanism(s): Monthly refill call.  She is meeting goals of therapy and will continue treatment.    Patient started prenatal vitamin- Informed of seperation of Triumqe at 2 hours before or 6 hours after taking medication containing Mg,Al,CA,Iron.        3/10/2023 2/8/2023 1/10/2023 12/12/2022 11/7/2022 10/11/2022 9/12/2022   Follow Up Review   # of missed doses 0 0 0 0 0 0 0   New Medications? Yes No No No Yes    No No No   New Conditions? No No No No No No No   New Allergies? No No No No No No No   Med Effective? Very good Good Good Good Good Good Good   Urgent Care? No No No No No No No   Requested Pharmacist? No No No No No No No       Multiple values from one day are sorted in reverse-chronological order         Therapy is appropriate to continue.    Therapy is effective: Yes  On scale of 1 to 10, how does patient rank quality of life? (10 - Best): 8  Recommendations: none at this time.  Review Method: Patient Contact    Tasks added this encounter   4/9/2023 - Refill Call (Auto Added)   Tasks due within next 3 months   3/6/2023 - Clinical - Follow Up Assesement  (Annual)     Taqueria Tan, PharmD  [facility]  [address]  [facility] [address]  Phone: [phone]  Fax: [phone]

## 2023-03-10 NOTE — TELEPHONE ENCOUNTER
Specialty Pharmacy - Refill Coordination  Specialty Pharmacy - Clinical Reassessment    Specialty Medication Orders Linked to Encounter      Flowsheet Row Most Recent Value   Medication #1 abacavir-dolutegravir-lamivud (TRIUMEQ) 600- mg Tab (Order#[id number], Rx#[id number])          Patient stopped Paxil and Ativan. No alternatives initiated at this time. Updated med list. Routing assigned [facility] for call back regarding f/u.    Refill Questions - Documented Responses      Flowsheet Row Most Recent Value   Refill Screening Questions    Changes to allergies? No   Changes to medications? Yes  [No longer on Paxil or Ativan]   New conditions since last clinic visit? No   Unplanned office visit, urgent care, ED, or hospital admission in the last 4 weeks? No   How does patient/caregiver feel medication is working? Very good   Financial problems or insurance changes? No   How many doses of your specialty medications were missed in the last 4 weeks? 0   Would patient like to speak to a pharmacist? No   When does the patient need to receive the medication? 03/17/23   Refill Delivery Questions    How will the patient receive the medication? MEDRx   When does the patient need to receive the medication? 03/17/23   Shipping Address Home   Address in [facility] confirmed and updated if neccessary? Yes   Expected Copay ($) 0   Is the patient able to afford the medication copay? Yes   Payment Method zero copay   Days supply of Refill 30   Supplies needed? No supplies needed   Refill activity completed? Yes   Refill activity plan Refill scheduled   Shipment/Pickup Date: 03/13/23            Current Outpatient Medications   Medication Sig    abacavir-dolutegravir-lamivud (TRIUMEQ) 600- mg Tab Take 1 tablet by mouth once daily.    ergocalciferol (ERGOCALCIFEROL) 50,000 unit Cap Take 1 capsule (50,000 Units total) by mouth every 7 days.    promethazine (PHENERGAN) 12.5 MG Tab Take 12.5 mg by mouth every 4 (four) hours as  needed.    sumatriptan (IMITREX) 100 MG tablet 1 tab oral may repeat every 2 hrs.. Max 2 tabs a day   Last reviewed on 1/18/2023  9:31 AM by Edelmira Ledesma MD    Review of patient's allergies indicates:   Allergen Reactions    Eggs [egg derived] Nausea Only    Ondansetron Dermatitis    Sulfa (sulfonamide antibiotics) Nausea Only    Sulfamethoxazole-trimethoprim Diarrhea    Last reviewed on  2/7/2023 8:58 AM by Lolly Centeno      Tasks added this encounter   4/9/2023 - Refill Call (Auto Added)   Tasks due within next 3 months   3/6/2023 - Clinical - Follow Up Assesement (Annual)     Rodrigo Miller, PharmD  [facility]  [address]  [facility] [address]  Phone: [phone]  Fax: [phone]

## 2023-03-17 ENCOUNTER — LAB VISIT (OUTPATIENT)
Dept: LAB | Facility: CLINIC | Age: 33
End: 2023-03-17
Payer: COMMERCIAL

## 2023-03-17 DIAGNOSIS — N91.2 AMENORRHEA: ICD-10-CM

## 2023-03-17 DIAGNOSIS — N91.2 AMENORRHEA: Primary | ICD-10-CM

## 2023-03-17 LAB — HCG INTACT+B SERPL-ACNC: <1.2 MIU/ML

## 2023-03-17 PROCEDURE — 84702 CHORIONIC GONADOTROPIN TEST: CPT | Performed by: FAMILY MEDICINE

## 2023-04-09 DIAGNOSIS — B20 HIV INFECTION, UNSPECIFIED SYMPTOM STATUS: ICD-10-CM

## 2023-04-10 ENCOUNTER — SPECIALTY PHARMACY (OUTPATIENT)
Dept: PHARMACY | Facility: CLINIC | Age: 33
End: 2023-04-10
Payer: COMMERCIAL

## 2023-04-10 ENCOUNTER — PATIENT MESSAGE (OUTPATIENT)
Dept: PHARMACY | Facility: CLINIC | Age: 33
End: 2023-04-10
Payer: COMMERCIAL

## 2023-04-10 RX ORDER — ABACAVIR SULFATE, DOLUTEGRAVIR SODIUM, LAMIVUDINE 600; 50; 300 MG/1; MG/1; MG/1
1 TABLET, FILM COATED ORAL DAILY
Qty: 90 TABLET | Refills: 1 | Status: SHIPPED | OUTPATIENT
Start: 2023-04-10 | End: 2023-09-28 | Stop reason: SDUPTHER

## 2023-04-10 NOTE — TELEPHONE ENCOUNTER
Specialty Pharmacy - Refill Coordination    Specialty Medication Orders Linked to Encounter      Flowsheet Row Most Recent Value   Medication #1 abacavir-dolutegravir-lamivud (TRIUMEQ) 600- mg Tab (Order#[id number], Rx#[id number])            Refill Questions - Documented Responses      Flowsheet Row Most Recent Value   Patient Availability and HIPAA Verification    Does patient want to proceed with activity? Yes   HIPAA/medical authority confirmed? Yes   Relationship to patient of person spoken to? Self   Refill Screening Questions    Changes to allergies? No   Changes to medications? No   New conditions since last clinic visit? No   Unplanned office visit, urgent care, ED, or hospital admission in the last 4 weeks? No   How does patient/caregiver feel medication is working? Good   Financial problems or insurance changes? No   How many doses of your specialty medications were missed in the last 4 weeks? 0   Would patient like to speak to a pharmacist? No   When does the patient need to receive the medication? 04/15/23   Refill Delivery Questions    How will the patient receive the medication? Mail   When does the patient need to receive the medication? 04/15/23   Shipping Address Home   Address in [facility] confirmed and updated if neccessary? Yes   Expected Copay ($) 0   Is the patient able to afford the medication copay? Yes   Payment Method zero copay   Days supply of Refill 30   Refill activity completed? Yes   Refill activity plan Refill scheduled   Shipment/Pickup Date: 04/11/23            Current Outpatient Medications   Medication Sig    abacavir-dolutegravir-lamivud (TRIUMEQ) 600- mg Tab Take 1 tablet by mouth once daily.    ergocalciferol (ERGOCALCIFEROL) 50,000 unit Cap Take 1 capsule (50,000 Units total) by mouth every 7 days.    promethazine (PHENERGAN) 12.5 MG Tab Take 12.5 mg by mouth every 4 (four) hours as needed.    sumatriptan (IMITREX) 100 MG tablet 1 tab oral may repeat every  2 hrs.. Max 2 tabs a day   Last reviewed on 3/10/2023  9:59 AM by Taqueria Tan PharmD    Review of patient's allergies indicates:   Allergen Reactions    Eggs [egg derived] Nausea Only    Ondansetron Dermatitis    Sulfa (sulfonamide antibiotics) Nausea Only    Sulfamethoxazole-trimethoprim Diarrhea    Last reviewed on  2/7/2023 8:58 AM by Lolly Centeno      Tasks added this encounter   5/8/2023 - Refill Call (Auto Added)   Tasks due within next 3 months   No tasks due.     [facility]  [address] [facility] [address]  Phone: [phone]  Fax: [phone]

## 2023-04-11 ENCOUNTER — OFFICE VISIT (OUTPATIENT)
Dept: OPTOMETRY | Facility: CLINIC | Age: 33
End: 2023-04-11
Payer: COMMERCIAL

## 2023-04-11 DIAGNOSIS — Z01.00 EXAMINATION OF EYES AND VISION: Primary | ICD-10-CM

## 2023-04-11 DIAGNOSIS — H04.123 DRY EYE SYNDROME, BILATERAL: ICD-10-CM

## 2023-04-11 DIAGNOSIS — H52.7 REFRACTIVE ERROR: ICD-10-CM

## 2023-04-11 PROCEDURE — 99999 PR PBB SHADOW E&M-EST. PATIENT-LVL III: CPT | Mod: PBBFAC,,, | Performed by: OPTOMETRIST

## 2023-04-11 PROCEDURE — 92015 DETERMINE REFRACTIVE STATE: CPT | Mod: S$GLB,,, | Performed by: OPTOMETRIST

## 2023-04-11 PROCEDURE — 92014 COMPRE OPH EXAM EST PT 1/>: CPT | Mod: S$GLB,,, | Performed by: OPTOMETRIST

## 2023-04-11 PROCEDURE — 92014 PR EYE EXAM, EST PATIENT,COMPREHESV: ICD-10-PCS | Mod: S$GLB,,, | Performed by: OPTOMETRIST

## 2023-04-11 PROCEDURE — 99999 PR PBB SHADOW E&M-EST. PATIENT-LVL III: ICD-10-PCS | Mod: PBBFAC,,, | Performed by: OPTOMETRIST

## 2023-04-11 PROCEDURE — 92015 PR REFRACTION: ICD-10-PCS | Mod: S$GLB,,, | Performed by: OPTOMETRIST

## 2023-04-11 NOTE — PROGRESS NOTES
HPI    Pt here for annual exam. Pt states vision decreasing OU distance, noticing   having to squint and strain even with specs. Denies headaches, no eye   pain, notes OD FBS today only no gtts.   Pt states tried contacts for a few days last a year, struggled getting   lens in, eyes started swelling.   Last edited by April Vo on 4/11/2023  8:13 AM.            Assessment /Plan     For exam results, see Encounter Report.    Examination of eyes and vision    Refractive error    Dry eye syndrome, bilateral      1. Examination of eyes and vision  Good ocular health OU    2. Refractive error  Dispensed updated spectacle Rx. Discussed various spectacle lens options. Discussed adaptation period to new specs.     3. Dry eye syndrome, bilateral  Discussed ocular affects of dry eyes. Recommend OTC Systane artificial tears 2-4 times a day in both eyes. Discussed chronicity of HARRISON. RTC if symptoms not alleviated by continued use of artificial tears.

## 2023-04-12 ENCOUNTER — PATIENT MESSAGE (OUTPATIENT)
Dept: OBSTETRICS AND GYNECOLOGY | Facility: CLINIC | Age: 33
End: 2023-04-12
Payer: COMMERCIAL

## 2023-04-17 ENCOUNTER — OFFICE VISIT (OUTPATIENT)
Dept: FAMILY MEDICINE | Facility: CLINIC | Age: 33
End: 2023-04-17
Payer: COMMERCIAL

## 2023-04-17 VITALS
BODY MASS INDEX: 31.34 KG/M2 | WEIGHT: 195 LBS | SYSTOLIC BLOOD PRESSURE: 106 MMHG | HEIGHT: 66 IN | RESPIRATION RATE: 16 BRPM | DIASTOLIC BLOOD PRESSURE: 80 MMHG | HEART RATE: 74 BPM | OXYGEN SATURATION: 97 %

## 2023-04-17 DIAGNOSIS — E66.9 CLASS 1 OBESITY WITHOUT SERIOUS COMORBIDITY WITH BODY MASS INDEX (BMI) OF 31.0 TO 31.9 IN ADULT, UNSPECIFIED OBESITY TYPE: ICD-10-CM

## 2023-04-17 DIAGNOSIS — F41.9 ANXIETY AND DEPRESSION: Primary | ICD-10-CM

## 2023-04-17 DIAGNOSIS — F32.A ANXIETY AND DEPRESSION: Primary | ICD-10-CM

## 2023-04-17 DIAGNOSIS — R73.9 HYPERGLYCEMIA: ICD-10-CM

## 2023-04-17 PROCEDURE — 3079F PR MOST RECENT DIASTOLIC BLOOD PRESSURE 80-89 MM HG: ICD-10-PCS | Mod: S$GLB,,, | Performed by: INTERNAL MEDICINE

## 2023-04-17 PROCEDURE — 3008F BODY MASS INDEX DOCD: CPT | Mod: S$GLB,,, | Performed by: INTERNAL MEDICINE

## 2023-04-17 PROCEDURE — 1159F PR MEDICATION LIST DOCUMENTED IN MEDICAL RECORD: ICD-10-PCS | Mod: S$GLB,,, | Performed by: INTERNAL MEDICINE

## 2023-04-17 PROCEDURE — 3079F DIAST BP 80-89 MM HG: CPT | Mod: S$GLB,,, | Performed by: INTERNAL MEDICINE

## 2023-04-17 PROCEDURE — 99212 OFFICE O/P EST SF 10 MIN: CPT | Mod: S$GLB,,, | Performed by: INTERNAL MEDICINE

## 2023-04-17 PROCEDURE — 3008F PR BODY MASS INDEX (BMI) DOCUMENTED: ICD-10-PCS | Mod: S$GLB,,, | Performed by: INTERNAL MEDICINE

## 2023-04-17 PROCEDURE — 83036 HEMOGLOBIN GLYCOSYLATED A1C: CPT | Performed by: INTERNAL MEDICINE

## 2023-04-17 PROCEDURE — 1159F MED LIST DOCD IN RCRD: CPT | Mod: S$GLB,,, | Performed by: INTERNAL MEDICINE

## 2023-04-17 PROCEDURE — 99212 PR OFFICE/OUTPT VISIT, EST, LEVL II, 10-19 MIN: ICD-10-PCS | Mod: S$GLB,,, | Performed by: INTERNAL MEDICINE

## 2023-04-17 PROCEDURE — 3074F SYST BP LT 130 MM HG: CPT | Mod: S$GLB,,, | Performed by: INTERNAL MEDICINE

## 2023-04-17 PROCEDURE — 3074F PR MOST RECENT SYSTOLIC BLOOD PRESSURE < 130 MM HG: ICD-10-PCS | Mod: S$GLB,,, | Performed by: INTERNAL MEDICINE

## 2023-04-17 RX ORDER — BUPROPION HYDROCHLORIDE 150 MG/1
150 TABLET ORAL DAILY
Qty: 30 TABLET | Refills: 11 | Status: SHIPPED | OUTPATIENT
Start: 2023-04-17 | End: 2024-04-16

## 2023-04-17 NOTE — PROGRESS NOTES
Subjective     Patient ID: Veronique Vick is a 32 y.o. female.    Chief Complaint: Follow-up (Pt presents to the clinic for anxiety. /Pt is wanting to switch from paxil to wellbutrin)    Patient presents to discuss potential therapy changes for management of her anxiety.  Had to stop the Paxil because of significant weight gain.  On review of the chart vital signs her weight was 184 back on November 29, 2022 come same on January 17, 2023 come and since then now is up to 195.  Patient has already started a significant caloric restriction diet with an increasing protein and still having difficulty being able to control her weight.    Adipex did help with control of the appetite but unfortunately cause severe dry mouth another side effects that she did not want to deal with.      Cymbalta was also tried all the way up to the 60 mg dose without significant improvement in the symptomatology either.  That was also around the time that her stress level was markedly increased at which time benzodiazepines were use instead.    We have reviewed the benefits and side effects of the bupropion and at this time will be prescribed in the 150 mg slow release formulation to be taken once every morning the prescription for 30 of them with 11 refills as a trial prescription.    Review of the lab work performed in the last 6 months shows no significant abnormalities with regards to glycemia thyroid bone marrow function comprehensive metabolic panel etc. but because of her hunger pangs when she tries to follow a fasting diet hemoglobin A1c will be drawn today and will have the report no later than this afternoon.    Medication list has been reviewed and there is no need for any other refills.      Vital signs remained stable.      Review of system is otherwise unremarkable.    Review of Systems   All other systems reviewed and are negative.       Objective     Physical Exam  Vitals and nursing note reviewed.   Constitutional:        General: She is not in acute distress.     Appearance: Normal appearance. She is obese. She is not ill-appearing, toxic-appearing or diaphoretic.   HENT:      Head: Normocephalic and atraumatic.   Cardiovascular:      Rate and Rhythm: Normal rate and regular rhythm.      Pulses: Normal pulses.      Heart sounds: Normal heart sounds. No murmur heard.    No gallop.   Pulmonary:      Effort: Pulmonary effort is normal.      Breath sounds: Normal breath sounds.   Musculoskeletal:      Right lower leg: No edema.      Left lower leg: No edema.   Skin:     General: Skin is warm and dry.      Coloration: Skin is not jaundiced or pale.   Neurological:      General: No focal deficit present.      Mental Status: She is alert and oriented to person, place, and time. Mental status is at baseline.      Cranial Nerves: No cranial nerve deficit.      Sensory: No sensory deficit.      Motor: No weakness.      Coordination: Coordination normal.      Gait: Gait normal.   Psychiatric:         Mood and Affect: Mood normal.         Behavior: Behavior normal.         Thought Content: Thought content normal.         Judgment: Judgment normal.          Assessment and Plan     Problem List Items Addressed This Visit    None  Visit Diagnoses       Hyperglycemia    -  Primary    Anxiety and depression        Class 1 obesity without serious comorbidity with body mass index (BMI) of 31.0 to 31.9 in adult, unspecified obesity type                As per the HPI patient will be having hemoglobin A1c drawn today and will have the report this afternoon and I will follow-up accordingly.    Bupropion  mg 1 every morning will be started as a trial and a prescription for 30 day supply has already been issued.  Patient can contact me with regards to any concerns regarding this treatment.      Lab work in the last 6 months has been reviewed and there is no need for any other repeats that I can see.      No medication changes were carried out with  regards to her routine therapy.

## 2023-04-18 LAB
ESTIMATED AVG GLUCOSE: 97 MG/DL (ref 68–131)
HBA1C MFR BLD: 5 % (ref 4–5.6)

## 2023-04-27 ENCOUNTER — HOSPITAL ENCOUNTER (OUTPATIENT)
Dept: RADIOLOGY | Facility: HOSPITAL | Age: 33
Discharge: HOME | End: 2023-04-27
Payer: COMMERCIAL

## 2023-04-27 DIAGNOSIS — R10.2 PELVIC PAIN: ICD-10-CM

## 2023-04-27 DIAGNOSIS — R10.2 PELVIC PAIN: Primary | ICD-10-CM

## 2023-04-27 PROCEDURE — 76830 US PELVIS COMP WITH TRANSVAG NON-OB (XPD): ICD-10-PCS | Mod: 26,,, | Performed by: RADIOLOGY

## 2023-04-27 PROCEDURE — 76856 US EXAM PELVIC COMPLETE: CPT | Mod: 26,,, | Performed by: RADIOLOGY

## 2023-04-27 PROCEDURE — 76856 US EXAM PELVIC COMPLETE: CPT | Mod: TC,PO

## 2023-04-27 PROCEDURE — 76830 TRANSVAGINAL US NON-OB: CPT | Mod: 26,,, | Performed by: RADIOLOGY

## 2023-04-27 PROCEDURE — 76856 US PELVIS COMP WITH TRANSVAG NON-OB (XPD): ICD-10-PCS | Mod: 26,,, | Performed by: RADIOLOGY

## 2023-05-09 ENCOUNTER — SPECIALTY PHARMACY (OUTPATIENT)
Dept: PHARMACY | Facility: CLINIC | Age: 33
End: 2023-05-09
Payer: COMMERCIAL

## 2023-05-09 NOTE — TELEPHONE ENCOUNTER
Specialty Pharmacy - Refill Coordination    Specialty Medication Orders Linked to Encounter      Flowsheet Row Most Recent Value   Medication #1 abacavir-dolutegravir-lamivud (TRIUMEQ) 600- mg Tab (Order#[id number], Rx#[id number])            Refill Questions - Documented Responses      Flowsheet Row Most Recent Value   Patient Availability and HIPAA Verification    Does patient want to proceed with activity? Yes   HIPAA/medical authority confirmed? Yes   Relationship to patient of person spoken to? Self   Refill Screening Questions    Changes to allergies? No   Changes to medications? No   New conditions since last clinic visit? No   Unplanned office visit, urgent care, ED, or hospital admission in the last 4 weeks? No   How does patient/caregiver feel medication is working? Good   Financial problems or insurance changes? No   How many doses of your specialty medications were missed in the last 4 weeks? 0   Would patient like to speak to a pharmacist? No   When does the patient need to receive the medication? 05/11/23   Refill Delivery Questions    How will the patient receive the medication? MEDRx   When does the patient need to receive the medication? 05/11/23   Shipping Address Home   Address in [facility] confirmed and updated if neccessary? Yes   Expected Copay ($) 0   Is the patient able to afford the medication copay? Yes   Payment Method zero copay   Days supply of Refill 30   Refill activity completed? Yes   Refill activity plan Refill scheduled   Shipment/Pickup Date: 05/10/23            Current Outpatient Medications   Medication Sig    abacavir-dolutegravir-lamivud (TRIUMEQ) 600- mg Tab Take 1 tablet by mouth once daily.    buPROPion (WELLBUTRIN XL) 150 MG TB24 tablet Take 1 tablet (150 mg total) by mouth once daily.    ergocalciferol (ERGOCALCIFEROL) 50,000 unit Cap Take 1 capsule (50,000 Units total) by mouth every 7 days.    promethazine (PHENERGAN) 12.5 MG Tab Take 12.5 mg by mouth  every 4 (four) hours as needed.    sumatriptan (IMITREX) 100 MG tablet 1 tab oral may repeat every 2 hrs.. Max 2 tabs a day   Last reviewed on 4/17/2023  8:36 AM by Jane Bazan MA    Review of patient's allergies indicates:   Allergen Reactions    Eggs [egg derived] Nausea Only    Ondansetron Dermatitis    Sulfa (sulfonamide antibiotics) Nausea Only    Sulfamethoxazole-trimethoprim Diarrhea    Last reviewed on  4/17/2023 8:36 AM by Jane Bazan      Tasks added this encounter   No tasks added.   Tasks due within next 3 months   No tasks due.     [facility]  [address] [facility] [address]  Phone: [phone]  Fax: [phone]

## 2023-05-16 ENCOUNTER — OFFICE VISIT (OUTPATIENT)
Dept: OBSTETRICS AND GYNECOLOGY | Facility: CLINIC | Age: 33
End: 2023-05-16
Payer: COMMERCIAL

## 2023-05-16 VITALS
WEIGHT: 187.81 LBS | DIASTOLIC BLOOD PRESSURE: 70 MMHG | BODY MASS INDEX: 30.18 KG/M2 | SYSTOLIC BLOOD PRESSURE: 110 MMHG | HEIGHT: 66 IN

## 2023-05-16 DIAGNOSIS — Z01.812 PRE-PROCEDURE LAB EXAM: Primary | ICD-10-CM

## 2023-05-16 DIAGNOSIS — R87.612 LGSIL ON PAP SMEAR OF CERVIX: ICD-10-CM

## 2023-05-16 LAB
B-HCG UR QL: NEGATIVE
CTP QC/QA: YES

## 2023-05-16 PROCEDURE — 57455 PR COLPOSCOPY,CERVIX W/ADJ VAGINA,W/BX: ICD-10-PCS | Mod: S$GLB,,, | Performed by: OBSTETRICS & GYNECOLOGY

## 2023-05-16 PROCEDURE — 81025 POCT URINE PREGNANCY: ICD-10-PCS | Mod: S$GLB,,, | Performed by: OBSTETRICS & GYNECOLOGY

## 2023-05-16 PROCEDURE — 81025 URINE PREGNANCY TEST: CPT | Mod: S$GLB,,, | Performed by: OBSTETRICS & GYNECOLOGY

## 2023-05-16 PROCEDURE — 88342 CHG IMMUNOCYTOCHEMISTRY: ICD-10-PCS | Mod: 26,,, | Performed by: PATHOLOGY

## 2023-05-16 PROCEDURE — 88305 TISSUE EXAM BY PATHOLOGIST: CPT | Performed by: PATHOLOGY

## 2023-05-16 PROCEDURE — 88342 IMHCHEM/IMCYTCHM 1ST ANTB: CPT | Performed by: PATHOLOGY

## 2023-05-16 PROCEDURE — 88305 TISSUE EXAM BY PATHOLOGIST: ICD-10-PCS | Mod: 26,,, | Performed by: PATHOLOGY

## 2023-05-16 PROCEDURE — 57455 BIOPSY OF CERVIX W/SCOPE: CPT | Mod: S$GLB,,, | Performed by: OBSTETRICS & GYNECOLOGY

## 2023-05-16 PROCEDURE — 99499 UNLISTED E&M SERVICE: CPT | Mod: S$GLB,,, | Performed by: OBSTETRICS & GYNECOLOGY

## 2023-05-16 PROCEDURE — 99999 PR PBB SHADOW E&M-EST. PATIENT-LVL III: CPT | Mod: PBBFAC,,, | Performed by: OBSTETRICS & GYNECOLOGY

## 2023-05-16 PROCEDURE — 88305 TISSUE EXAM BY PATHOLOGIST: CPT | Mod: 26,,, | Performed by: PATHOLOGY

## 2023-05-16 PROCEDURE — 99499 NO LOS: ICD-10-PCS | Mod: S$GLB,,, | Performed by: OBSTETRICS & GYNECOLOGY

## 2023-05-16 PROCEDURE — 88342 IMHCHEM/IMCYTCHM 1ST ANTB: CPT | Mod: 26,,, | Performed by: PATHOLOGY

## 2023-05-16 PROCEDURE — 99999 PR PBB SHADOW E&M-EST. PATIENT-LVL III: ICD-10-PCS | Mod: PBBFAC,,, | Performed by: OBSTETRICS & GYNECOLOGY

## 2023-05-16 NOTE — PROGRESS NOTES
COLPOSCOPY:  5/16/2023    PAP Result: Low Grade Squamous Epithelial Lesion   1. Pre-procedure lab exam  POCT Urine Pregnancy      2. LGSIL on Pap smear of cervix  Specimen to Pathology, Ob/Gyn          PRE-COLPOSCOPY PROCEDURE COUNSELING:  Discussed the abnormal pap test findings, HPV, need for colposcopy and possible biopsies to determine a diagnosis and plan of care, treatments available, the minimal risks of bleeding and infection with a colposcopy, alternatives to colposcopy and she agrees to proceed.    Procedure:   Speculum was placed. Cervix completely visualized.  transformation zone clearly visualized. Green filter activated: vascular abnormalities: not seen  Green filter disengaged and acetic acid applied in the usual fashion:  AcetoWhite epithelium 12:00 endoceervical edge seen.  Mosaic pattern not seen.  Biopsy 12:00 performed.  ECC not done.    Monsel's solution applied to biopsy site for hemostasis and tolerated well.   The speculum was removed.  The patient tolerated the procedure well.    POST COLPOSCOPY COUNSELING:   Manage post colposcopy cramping with NSAIDs, Tylenol or Rx per [employer].  Avoid anything in vagina (intercourse, douching, tampons) one week after the procedure.  Expect a clumpy blackish vaginal discharge (Monsel's solution) for several days.  Report bleeding heavier than a period, worsening pain, fever > 101.0 F, or foul-smelling vaginal discharge.  HPV vaccine recommended according to FDA age guidelines.  Importance of follow-up stressed.    Counseling lasted approximately 15 minutes and all her questions were answered.    Assessment:  1. Pre-procedure lab exam  POCT Urine Pregnancy      2. LGSIL on Pap smear of cervix  Specimen to Pathology, Ob/Gyn      Awe 12:00 - c/w lgsil    Plan:  Bx - loop if >Low grade    Patient informed will be contacted within 2 weeks of results, either normal or abnormal. Please call if she has not heard from us by then.

## 2023-05-23 ENCOUNTER — PATIENT MESSAGE (OUTPATIENT)
Dept: OBSTETRICS AND GYNECOLOGY | Facility: CLINIC | Age: 33
End: 2023-05-23
Payer: COMMERCIAL

## 2023-05-23 LAB
FINAL PATHOLOGIC DIAGNOSIS: NORMAL
GROSS: NORMAL
Lab: NORMAL

## 2023-05-31 ENCOUNTER — OFFICE VISIT (OUTPATIENT)
Dept: FAMILY MEDICINE | Facility: CLINIC | Age: 33
End: 2023-05-31
Payer: COMMERCIAL

## 2023-05-31 VITALS
TEMPERATURE: 98 F | HEIGHT: 66 IN | SYSTOLIC BLOOD PRESSURE: 108 MMHG | HEART RATE: 68 BPM | BODY MASS INDEX: 29.25 KG/M2 | DIASTOLIC BLOOD PRESSURE: 68 MMHG | WEIGHT: 182 LBS | OXYGEN SATURATION: 98 %

## 2023-05-31 DIAGNOSIS — N30.01 ACUTE CYSTITIS WITH HEMATURIA: ICD-10-CM

## 2023-05-31 DIAGNOSIS — R30.0 DYSURIA: Primary | ICD-10-CM

## 2023-05-31 LAB
BILIRUBIN, UA POC OHS: NEGATIVE
BLOOD, UA POC OHS: ABNORMAL
CLARITY, UA POC OHS: ABNORMAL
COLOR, UA POC OHS: YELLOW
GLUCOSE, UA POC OHS: NEGATIVE
KETONES, UA POC OHS: NEGATIVE
LEUKOCYTES, UA POC OHS: ABNORMAL
NITRITE, UA POC OHS: NEGATIVE
PH, UA POC OHS: 7
PROTEIN, UA POC OHS: NEGATIVE
SPECIFIC GRAVITY, UA POC OHS: 1.02
UROBILINOGEN, UA POC OHS: 0.2

## 2023-05-31 PROCEDURE — 87086 URINE CULTURE/COLONY COUNT: CPT

## 2023-05-31 PROCEDURE — 1160F PR REVIEW ALL MEDS BY PRESCRIBER/CLIN PHARMACIST DOCUMENTED: ICD-10-PCS | Mod: ,,,

## 2023-05-31 PROCEDURE — 81003 POCT URINALYSIS(INSTRUMENT): ICD-10-PCS | Mod: QW,,,

## 2023-05-31 PROCEDURE — 3074F PR MOST RECENT SYSTOLIC BLOOD PRESSURE < 130 MM HG: ICD-10-PCS | Mod: ,,,

## 2023-05-31 PROCEDURE — 99213 PR OFFICE/OUTPT VISIT, EST, LEVL III, 20-29 MIN: ICD-10-PCS | Mod: ,,,

## 2023-05-31 PROCEDURE — 1159F PR MEDICATION LIST DOCUMENTED IN MEDICAL RECORD: ICD-10-PCS | Mod: ,,,

## 2023-05-31 PROCEDURE — 3078F PR MOST RECENT DIASTOLIC BLOOD PRESSURE < 80 MM HG: ICD-10-PCS | Mod: ,,,

## 2023-05-31 PROCEDURE — 3008F PR BODY MASS INDEX (BMI) DOCUMENTED: ICD-10-PCS | Mod: ,,,

## 2023-05-31 PROCEDURE — 3044F HG A1C LEVEL LT 7.0%: CPT | Mod: ,,,

## 2023-05-31 PROCEDURE — 3008F BODY MASS INDEX DOCD: CPT | Mod: ,,,

## 2023-05-31 PROCEDURE — 3078F DIAST BP <80 MM HG: CPT | Mod: ,,,

## 2023-05-31 PROCEDURE — 99213 OFFICE O/P EST LOW 20 MIN: CPT | Mod: ,,,

## 2023-05-31 PROCEDURE — 3074F SYST BP LT 130 MM HG: CPT | Mod: ,,,

## 2023-05-31 PROCEDURE — 1159F MED LIST DOCD IN RCRD: CPT | Mod: ,,,

## 2023-05-31 PROCEDURE — 3044F PR MOST RECENT HEMOGLOBIN A1C LEVEL <7.0%: ICD-10-PCS | Mod: ,,,

## 2023-05-31 PROCEDURE — 1160F RVW MEDS BY RX/DR IN RCRD: CPT | Mod: ,,,

## 2023-05-31 PROCEDURE — 81003 URINALYSIS AUTO W/O SCOPE: CPT | Mod: QW,,,

## 2023-05-31 RX ORDER — NITROFURANTOIN 25; 75 MG/1; MG/1
100 CAPSULE ORAL 2 TIMES DAILY
Qty: 14 CAPSULE | Refills: 0 | Status: SHIPPED | OUTPATIENT
Start: 2023-05-31 | End: 2023-06-07

## 2023-05-31 NOTE — PROGRESS NOTES
Subjective:       Patient ID: Veronique Vick is a 32 y.o. female.    Chief Complaint: Dysuria ( With urgency and frequency. Started this past Monday morning. No fever , no chills. No flank pain)    Patient presents to the clinic with complaints of dysuria.     States symptoms started Saturday with dysuria and urinary frequency.     Has no other complaints or concerns today.     Patient educated on plan of care, verbalized understanding.       Review of Systems   Constitutional:  Negative for activity change, appetite change, chills, diaphoresis and fever.   HENT:  Negative for congestion, ear pain, postnasal drip, sinus pressure, sneezing and sore throat.    Eyes:  Negative for pain, discharge, redness and itching.   Respiratory:  Negative for apnea, cough, chest tightness, shortness of breath and wheezing.    Cardiovascular:  Negative for chest pain and leg swelling.   Gastrointestinal:  Negative for abdominal distention, abdominal pain, constipation, diarrhea, nausea and vomiting.   Genitourinary:  Positive for difficulty urinating, dysuria and frequency. Negative for flank pain.   Skin:  Negative for color change, rash and wound.   Neurological:  Negative for dizziness.   All other systems reviewed and are negative.    Patient Active Problem List   Diagnosis    Wrist fracture    Closed fracture of distal end of left radius with routine healing    Pap smear of cervix with ASCUS, cannot exclude HGSIL    Migraine    Irritable bowel syndrome    Decreased strength    Muscle tightness    Posture abnormality       Objective:      Physical Exam  Vitals and nursing note reviewed.   Constitutional:       General: She is not in acute distress.     Appearance: Normal appearance. She is well-developed.   HENT:      Head: Normocephalic.      Nose: Nose normal.   Eyes:      Conjunctiva/sclera: Conjunctivae normal.      Pupils: Pupils are equal, round, and reactive to light.   Cardiovascular:      Rate and Rhythm: Normal  "rate.   Pulmonary:      Effort: Pulmonary effort is normal. No respiratory distress.   Musculoskeletal:      Cervical back: Normal range of motion.   Skin:     General: Skin is warm and dry.      Findings: No rash.   Neurological:      Mental Status: She is alert and oriented to person, place, and time.   Psychiatric:         Behavior: Behavior normal.       Lab Results   Component Value Date    WBC 6.92 02/03/2023    HGB 13.0 02/03/2023    HCT 38.1 02/03/2023     02/03/2023    CHOL 208 (H) 10/04/2022    TRIG 76 10/04/2022    HDL 64 10/04/2022    ALT 28 12/28/2022    AST 21 12/28/2022     12/28/2022    K 4.5 12/28/2022     12/28/2022    CREATININE 0.9 12/28/2022    BUN 10 12/28/2022    CO2 22 (L) 12/28/2022    TSH 0.421 11/15/2022    HGBA1C 5.0 04/17/2023     The ASCVD Risk score (Willard DK, et al., 2019) failed to calculate for the following reasons:    The 2019 ASCVD risk score is only valid for ages 40 to 79  Visit Vitals  /68 (BP Location: Left arm, Patient Position: Sitting, BP Method: Medium (Manual))   Pulse 68   Temp 97.5 °F (36.4 °C) (Temporal)   Ht 5' 6" (1.676 m)   Wt 82.6 kg (182 lb)   LMP 05/11/2023 (Exact Date)   SpO2 98%   BMI 29.38 kg/m²      Assessment:       1. Dysuria    2. Acute cystitis with hematuria        Plan:       1. Dysuria/Acute cystitis with hematuria  -     POCT Urinalysis(Instrument)  -     Urine culture  -     nitrofurantoin, macrocrystal-monohydrate, (MACROBID) 100 MG capsule; Take 1 capsule (100 mg total) by mouth 2 (two) times daily. for 7 days  Dispense: 14 capsule; Refill: 0   - Increase water intake   - The diagnosis, treatment plan, instructions for follow-up and reevaluation as well as ED precautions were discussed and understanding was verbalized. All questions or concerns have been addressed.        Follow up if symptoms worsen or fail to improve.      Future Appointments       Date Provider Specialty Appt Notes    9/5/2023 Yevgeniy Fields MD " Obstetrics and Gynecology pap smear

## 2023-05-31 NOTE — PATIENT INSTRUCTIONS

## 2023-06-02 ENCOUNTER — PATIENT MESSAGE (OUTPATIENT)
Dept: PHARMACY | Facility: CLINIC | Age: 33
End: 2023-06-02
Payer: COMMERCIAL

## 2023-06-02 LAB — BACTERIA UR CULT: NORMAL

## 2023-06-07 ENCOUNTER — PATIENT MESSAGE (OUTPATIENT)
Dept: INFECTIOUS DISEASES | Facility: CLINIC | Age: 33
End: 2023-06-07

## 2023-06-07 ENCOUNTER — SPECIALTY PHARMACY (OUTPATIENT)
Dept: PHARMACY | Facility: CLINIC | Age: 33
End: 2023-06-07
Payer: COMMERCIAL

## 2023-06-07 ENCOUNTER — PATIENT MESSAGE (OUTPATIENT)
Dept: PHARMACY | Facility: CLINIC | Age: 33
End: 2023-06-07
Payer: COMMERCIAL

## 2023-06-07 DIAGNOSIS — E55.9 VITAMIN D DEFICIENCY: ICD-10-CM

## 2023-06-07 DIAGNOSIS — B20 HIV INFECTION, UNSPECIFIED SYMPTOM STATUS: Primary | ICD-10-CM

## 2023-06-07 NOTE — TELEPHONE ENCOUNTER
Specialty Pharmacy - Refill Coordination    Specialty Medication Orders Linked to Encounter      Flowsheet Row Most Recent Value   Medication #1 abacavir-dolutegravir-lamivud (TRIUMEQ) 600- mg Tab (Order#[id number], Rx#[id number])            Refill Questions - Documented Responses      Flowsheet Row Most Recent Value   Patient Availability and HIPAA Verification    Does patient want to proceed with activity? Yes   HIPAA/medical authority confirmed? Yes   Relationship to patient of person spoken to? Self   Refill Screening Questions    Changes to allergies? No   Changes to medications? No   New conditions since last clinic visit? No   Unplanned office visit, urgent care, ED, or hospital admission in the last 4 weeks? No   How does patient/caregiver feel medication is working? Good   Financial problems or insurance changes? No   How many doses of your specialty medications were missed in the last 4 weeks? 0   Would patient like to speak to a pharmacist? No   When does the patient need to receive the medication? 06/16/23   Refill Delivery Questions    How will the patient receive the medication? Mail   When does the patient need to receive the medication? 06/16/23   Shipping Address Home   Address in [facility] confirmed and updated if neccessary? Yes   Expected Copay ($) 0   Is the patient able to afford the medication copay? Yes   Payment Method zero copay   Days supply of Refill 30   Refill activity completed? Yes   Refill activity plan Refill scheduled   Shipment/Pickup Date: 06/13/23            Current Outpatient Medications   Medication Sig    abacavir-dolutegravir-lamivud (TRIUMEQ) 600- mg Tab Take 1 tablet by mouth once daily.    buPROPion (WELLBUTRIN XL) 150 MG TB24 tablet Take 1 tablet (150 mg total) by mouth once daily.    ergocalciferol (ERGOCALCIFEROL) 50,000 unit Cap Take 1 capsule (50,000 Units total) by mouth every 7 days.    methenamine/sodium salicylate (CYSTEX PLUS,  METHENAMINE-SAL, ORAL) Take by mouth.    nitrofurantoin, macrocrystal-monohydrate, (MACROBID) 100 MG capsule Take 1 capsule (100 mg total) by mouth 2 (two) times daily. for 7 days    promethazine (PHENERGAN) 12.5 MG Tab Take 12.5 mg by mouth every 4 (four) hours as needed.    sumatriptan (IMITREX) 100 MG tablet 1 tab oral may repeat every 2 hrs.. Max 2 tabs a day   Last reviewed on 5/31/2023 10:59 AM by Debora Young, NAM    Review of patient's allergies indicates:   Allergen Reactions    Eggs [egg derived] Nausea Only    Ondansetron Dermatitis    Sulfa (sulfonamide antibiotics) Nausea Only    Sulfamethoxazole-trimethoprim Diarrhea    Last reviewed on  5/31/2023 10:59 AM by Debora Young      Tasks added this encounter   No tasks added.   Tasks due within next 3 months   No tasks due.     [facility]  [address] [place] [id number]  Phone: [phone]  Fax: [phone]

## 2023-06-13 ENCOUNTER — OFFICE VISIT (OUTPATIENT)
Dept: FAMILY MEDICINE | Facility: CLINIC | Age: 33
End: 2023-06-13
Payer: COMMERCIAL

## 2023-06-13 DIAGNOSIS — J02.9 PHARYNGITIS, UNSPECIFIED ETIOLOGY: Primary | ICD-10-CM

## 2023-06-13 PROCEDURE — 1160F PR REVIEW ALL MEDS BY PRESCRIBER/CLIN PHARMACIST DOCUMENTED: ICD-10-PCS | Mod: 95,,,

## 2023-06-13 PROCEDURE — 99213 OFFICE O/P EST LOW 20 MIN: CPT | Mod: 95,,,

## 2023-06-13 PROCEDURE — 1160F RVW MEDS BY RX/DR IN RCRD: CPT | Mod: 95,,,

## 2023-06-13 PROCEDURE — 1159F MED LIST DOCD IN RCRD: CPT | Mod: 95,,,

## 2023-06-13 PROCEDURE — 3044F HG A1C LEVEL LT 7.0%: CPT | Mod: 95,,,

## 2023-06-13 PROCEDURE — 99213 PR OFFICE/OUTPT VISIT, EST, LEVL III, 20-29 MIN: ICD-10-PCS | Mod: 95,,,

## 2023-06-13 PROCEDURE — 1159F PR MEDICATION LIST DOCUMENTED IN MEDICAL RECORD: ICD-10-PCS | Mod: 95,,,

## 2023-06-13 PROCEDURE — 3044F PR MOST RECENT HEMOGLOBIN A1C LEVEL <7.0%: ICD-10-PCS | Mod: 95,,,

## 2023-06-13 RX ORDER — AMOXICILLIN AND CLAVULANATE POTASSIUM 875; 125 MG/1; MG/1
1 TABLET, FILM COATED ORAL EVERY 12 HOURS
Qty: 20 TABLET | Refills: 0 | Status: SHIPPED | OUTPATIENT
Start: 2023-06-13 | End: 2023-06-23

## 2023-06-13 NOTE — PROGRESS NOTES
Subjective:       Patient ID: Veronique Vick is a 32 y.o. female.  The patient location is: [place], [place]  The chief complaint leading to consultation is: Sore throat    Visit type: audiovisual    Face to Face time with patient: 5  10 minutes of total time spent on the encounter, which includes face to face time and non-face to face time preparing to see the patient (eg, review of tests), Obtaining and/or reviewing separately obtained history, Documenting clinical information in the electronic or other health record, Independently interpreting results (not separately reported) and communicating results to the patient/family/caregiver, or Care coordination (not separately reported).         Each patient to whom he or she provides medical services by telemedicine is:  (1) informed of the relationship between the physician and patient and the respective role of any other health care provider with respect to management of the patient; and (2) notified that he or she may decline to receive medical services by telemedicine and may withdraw from such care at any time.      Chief Complaint: No chief complaint on file.    Patient presents virtually to the clinic with complaint of sore throat.     States yesterday she experienced nausea, vomiting, and diarrhea. States she woke up today with a sore throat. She took her son to the pediatrician and he tested positive for strep this AM. The nausea, vomiting, and diarrhea have resolved.     Patient educated on plan of care, verbalized understanding.       Sore Throat   This is a new problem. The current episode started in the past 7 days. The problem has been unchanged. The pain is worse on the left side. There has been no fever. The pain is at a severity of 3/10. The pain is mild. Associated symptoms include congestion, coughing, diarrhea, headaches and vomiting. Pertinent negatives include no abdominal pain, drooling, ear discharge, ear pain, hoarse voice, plugged ear  sensation, neck pain, shortness of breath, stridor, swollen glands or trouble swallowing. She has had exposure to strep. She has had no exposure to mono. She has tried NSAIDs, acetaminophen and cool liquids for the symptoms. The treatment provided mild relief.   Review of Systems   HENT:  Positive for congestion and sore throat. Negative for drooling, ear discharge, ear pain, hoarse voice and trouble swallowing.    Respiratory:  Positive for cough. Negative for shortness of breath and stridor.    Gastrointestinal:  Positive for diarrhea and vomiting. Negative for abdominal pain.   Musculoskeletal:  Negative for neck pain.   Neurological:  Positive for headaches.     Patient Active Problem List   Diagnosis    Wrist fracture    Closed fracture of distal end of left radius with routine healing    Pap smear of cervix with ASCUS, cannot exclude HGSIL    Migraine    Irritable bowel syndrome    Decreased strength    Muscle tightness    Posture abnormality       Objective:      Physical Exam  Constitutional:       General: She is not in acute distress.     Appearance: Normal appearance. She is not ill-appearing.   HENT:      Head: Normocephalic.   Eyes:      Conjunctiva/sclera: Conjunctivae normal.      Pupils: Pupils are equal, round, and reactive to light.      Comments: As seen on virtual visit   Pulmonary:      Effort: Pulmonary effort is normal. No respiratory distress.   Musculoskeletal:      Cervical back: Normal range of motion and neck supple.   Skin:     General: Skin is warm and dry.   Neurological:      General: No focal deficit present.      Mental Status: She is alert and oriented to person, place, and time.   Psychiatric:         Mood and Affect: Mood normal.         Behavior: Behavior normal.       Lab Results   Component Value Date    WBC 6.92 02/03/2023    HGB 13.0 02/03/2023    HCT 38.1 02/03/2023     02/03/2023    CHOL 208 (H) 10/04/2022    TRIG 76 10/04/2022    HDL 64 10/04/2022    ALT 28  12/28/2022    AST 21 12/28/2022     12/28/2022    K 4.5 12/28/2022     12/28/2022    CREATININE 0.9 12/28/2022    BUN 10 12/28/2022    CO2 22 (L) 12/28/2022    TSH 0.421 11/15/2022    HGBA1C 5.0 04/17/2023     The ASCVD Risk score (Willard DK, et al., 2019) failed to calculate for the following reasons:    The 2019 ASCVD risk score is only valid for ages 40 to 79    Assessment:       1. Pharyngitis, unspecified etiology        Plan:       1. Pharyngitis, unspecified etiology  -     amoxicillin-clavulanate 875-125mg (AUGMENTIN) 875-125 mg per tablet; Take 1 tablet by mouth every 12 (twelve) hours. for 10 days  Dispense: 20 tablet; Refill: 0   - Salt water gargles   - Tylenol/Motrin as directed for pain   - The diagnosis, treatment plan, instructions for follow-up and reevaluation as well as ED precautions were discussed and understanding was verbalized. All questions or concerns have been addressed.        Follow up if symptoms worsen or fail to improve.      Future Appointments       Date Provider Specialty Appt Notes    9/5/2023 Yevgeniy Fields MD Obstetrics and Gynecology pap smear

## 2023-06-19 ENCOUNTER — LAB VISIT (OUTPATIENT)
Dept: LAB | Facility: CLINIC | Age: 33
End: 2023-06-19
Payer: COMMERCIAL

## 2023-06-19 DIAGNOSIS — E55.9 VITAMIN D DEFICIENCY: ICD-10-CM

## 2023-06-19 DIAGNOSIS — B20 HIV INFECTION, UNSPECIFIED SYMPTOM STATUS: ICD-10-CM

## 2023-06-19 LAB
ALBUMIN SERPL BCP-MCNC: 4.2 G/DL (ref 3.5–5.2)
ALP SERPL-CCNC: 62 U/L (ref 55–135)
ALT SERPL W/O P-5'-P-CCNC: 43 U/L (ref 10–44)
ANION GAP SERPL CALC-SCNC: 10 MMOL/L (ref 8–16)
AST SERPL-CCNC: 24 U/L (ref 10–40)
BASOPHILS # BLD AUTO: 0.04 K/UL (ref 0–0.2)
BASOPHILS NFR BLD: 0.7 % (ref 0–1.9)
BILIRUB SERPL-MCNC: 0.3 MG/DL (ref 0.1–1)
BUN SERPL-MCNC: 7 MG/DL (ref 6–20)
CALCIUM SERPL-MCNC: 9 MG/DL (ref 8.7–10.5)
CHLORIDE SERPL-SCNC: 106 MMOL/L (ref 95–110)
CO2 SERPL-SCNC: 23 MMOL/L (ref 23–29)
CREAT SERPL-MCNC: 0.9 MG/DL (ref 0.5–1.4)
DIFFERENTIAL METHOD: ABNORMAL
EOSINOPHIL # BLD AUTO: 0.2 K/UL (ref 0–0.5)
EOSINOPHIL NFR BLD: 3.1 % (ref 0–8)
ERYTHROCYTE [DISTWIDTH] IN BLOOD BY AUTOMATED COUNT: 12.1 % (ref 11.5–14.5)
EST. GFR  (NO RACE VARIABLE): >60 ML/MIN/1.73 M^2
GLUCOSE SERPL-MCNC: 93 MG/DL (ref 70–110)
HCT VFR BLD AUTO: 37 % (ref 37–48.5)
HGB BLD-MCNC: 12.6 G/DL (ref 12–16)
IMM GRANULOCYTES # BLD AUTO: 0.01 K/UL (ref 0–0.04)
IMM GRANULOCYTES NFR BLD AUTO: 0.2 % (ref 0–0.5)
LYMPHOCYTES # BLD AUTO: 2.9 K/UL (ref 1–4.8)
LYMPHOCYTES NFR BLD: 51.6 % (ref 18–48)
MCH RBC QN AUTO: 32.1 PG (ref 27–31)
MCHC RBC AUTO-ENTMCNC: 34.1 G/DL (ref 32–36)
MCV RBC AUTO: 94 FL (ref 82–98)
MONOCYTES # BLD AUTO: 0.3 K/UL (ref 0.3–1)
MONOCYTES NFR BLD: 4.5 % (ref 4–15)
NEUTROPHILS # BLD AUTO: 2.2 K/UL (ref 1.8–7.7)
NEUTROPHILS NFR BLD: 39.9 % (ref 38–73)
NRBC BLD-RTO: 0 /100 WBC
PLATELET # BLD AUTO: 178 K/UL (ref 150–450)
PMV BLD AUTO: 11.6 FL (ref 9.2–12.9)
POTASSIUM SERPL-SCNC: 3.7 MMOL/L (ref 3.5–5.1)
PROT SERPL-MCNC: 7.1 G/DL (ref 6–8.4)
RBC # BLD AUTO: 3.92 M/UL (ref 4–5.4)
SODIUM SERPL-SCNC: 139 MMOL/L (ref 136–145)
WBC # BLD AUTO: 5.52 K/UL (ref 3.9–12.7)

## 2023-06-19 PROCEDURE — 80053 COMPREHEN METABOLIC PANEL: CPT | Performed by: INTERNAL MEDICINE

## 2023-06-19 PROCEDURE — 87536 HIV-1 QUANT&REVRSE TRNSCRPJ: CPT | Performed by: INTERNAL MEDICINE

## 2023-06-19 PROCEDURE — 86592 SYPHILIS TEST NON-TREP QUAL: CPT | Performed by: INTERNAL MEDICINE

## 2023-06-19 PROCEDURE — 85025 COMPLETE CBC W/AUTO DIFF WBC: CPT | Performed by: INTERNAL MEDICINE

## 2023-06-19 PROCEDURE — 82306 VITAMIN D 25 HYDROXY: CPT | Performed by: INTERNAL MEDICINE

## 2023-06-20 LAB
25(OH)D3+25(OH)D2 SERPL-MCNC: 42 NG/ML (ref 30–96)
RPR SER QL: NORMAL

## 2023-06-21 ENCOUNTER — PATIENT MESSAGE (OUTPATIENT)
Dept: INFECTIOUS DISEASES | Facility: CLINIC | Age: 33
End: 2023-06-21

## 2023-06-21 LAB
HIV1 RNA # SERPL NAA+PROBE: NOT DETECTED COPIES/ML
HIV1 RNA SERPL QL NAA+PROBE: NOT DETECTED

## 2023-07-11 ENCOUNTER — SPECIALTY PHARMACY (OUTPATIENT)
Dept: PHARMACY | Facility: CLINIC | Age: 33
End: 2023-07-11
Payer: COMMERCIAL

## 2023-07-11 NOTE — TELEPHONE ENCOUNTER
Specialty Pharmacy - Refill Coordination    Specialty Medication Orders Linked to Encounter      Flowsheet Row Most Recent Value   Medication #1 abacavir-dolutegravir-lamivud (TRIUMEQ) 600- mg Tab (Order#[id number], Rx#[id number])            Refill Questions - Documented Responses      Flowsheet Row Most Recent Value   Patient Availability and HIPAA Verification    Does patient want to proceed with activity? Yes   HIPAA/medical authority confirmed? Yes   Relationship to patient of person spoken to? Self   Refill Screening Questions    Changes to allergies? No   Changes to medications? No   New conditions since last clinic visit? No   Unplanned office visit, urgent care, ED, or hospital admission in the last 4 weeks? No   How does patient/caregiver feel medication is working? Good   Financial problems or insurance changes? No   How many doses of your specialty medications were missed in the last 4 weeks? 0   Would patient like to speak to a pharmacist? No   When does the patient need to receive the medication? 07/18/23   Refill Delivery Questions    How will the patient receive the medication? Mail   When does the patient need to receive the medication? 07/18/23   Shipping Address Home   Address in [facility] confirmed and updated if neccessary? Yes   Expected Copay ($) 0   Is the patient able to afford the medication copay? Yes   Payment Method zero copay   Days supply of Refill 30   Supplies needed? No supplies needed   Refill activity completed? Yes   Refill activity plan Refill scheduled   Shipment/Pickup Date: 07/12/23            Current Outpatient Medications   Medication Sig    abacavir-dolutegravir-lamivud (TRIUMEQ) 600- mg Tab Take 1 tablet by mouth once daily.    buPROPion (WELLBUTRIN XL) 150 MG TB24 tablet Take 1 tablet (150 mg total) by mouth once daily.    ergocalciferol (ERGOCALCIFEROL) 50,000 unit Cap Take 1 capsule (50,000 Units total) by mouth every 7 days.    methenamine/sodium  salicylate (CYSTEX PLUS, METHENAMINE-SAL, ORAL) Take by mouth.    promethazine (PHENERGAN) 12.5 MG Tab Take 12.5 mg by mouth every 4 (four) hours as needed.    sumatriptan (IMITREX) 100 MG tablet 1 tab oral may repeat every 2 hrs.. Max 2 tabs a day   Last reviewed on 6/13/2023 11:58 AM by Debora Young, NP    Review of patient's allergies indicates:   Allergen Reactions    Eggs [egg derived] Nausea Only    Ondansetron Dermatitis    Sulfa (sulfonamide antibiotics) Nausea Only    Sulfamethoxazole-trimethoprim Diarrhea    Last reviewed on  6/13/2023 11:58 AM by Debora Young      Tasks added this encounter   No tasks added.   Tasks due within next 3 months   No tasks due.     Taqueria Tan, PharmD  [facility]  [address] [place] [id number]  Phone: [phone]  Fax: [phone]

## 2023-07-20 ENCOUNTER — OFFICE VISIT (OUTPATIENT)
Dept: INFECTIOUS DISEASES | Facility: CLINIC | Age: 33
End: 2023-07-20
Payer: COMMERCIAL

## 2023-07-20 VITALS
OXYGEN SATURATION: 99 % | HEART RATE: 55 BPM | SYSTOLIC BLOOD PRESSURE: 114 MMHG | BODY MASS INDEX: 29.83 KG/M2 | DIASTOLIC BLOOD PRESSURE: 62 MMHG | WEIGHT: 185.63 LBS | TEMPERATURE: 99 F | HEIGHT: 66 IN

## 2023-07-20 DIAGNOSIS — Z79.899 ENCOUNTER FOR LONG-TERM (CURRENT) USE OF MEDICATIONS: ICD-10-CM

## 2023-07-20 DIAGNOSIS — B20 HIV INFECTION, UNSPECIFIED SYMPTOM STATUS: Primary | ICD-10-CM

## 2023-07-20 DIAGNOSIS — E55.9 VITAMIN D DEFICIENCY: ICD-10-CM

## 2023-07-20 PROCEDURE — 3074F PR MOST RECENT SYSTOLIC BLOOD PRESSURE < 130 MM HG: ICD-10-PCS | Mod: CPTII,S$GLB,, | Performed by: INTERNAL MEDICINE

## 2023-07-20 PROCEDURE — 1159F PR MEDICATION LIST DOCUMENTED IN MEDICAL RECORD: ICD-10-PCS | Mod: CPTII,S$GLB,, | Performed by: INTERNAL MEDICINE

## 2023-07-20 PROCEDURE — 3078F DIAST BP <80 MM HG: CPT | Mod: CPTII,S$GLB,, | Performed by: INTERNAL MEDICINE

## 2023-07-20 PROCEDURE — 3008F BODY MASS INDEX DOCD: CPT | Mod: CPTII,S$GLB,, | Performed by: INTERNAL MEDICINE

## 2023-07-20 PROCEDURE — 3008F PR BODY MASS INDEX (BMI) DOCUMENTED: ICD-10-PCS | Mod: CPTII,S$GLB,, | Performed by: INTERNAL MEDICINE

## 2023-07-20 PROCEDURE — 99214 PR OFFICE/OUTPT VISIT, EST, LEVL IV, 30-39 MIN: ICD-10-PCS | Mod: S$GLB,,, | Performed by: INTERNAL MEDICINE

## 2023-07-20 PROCEDURE — 3044F PR MOST RECENT HEMOGLOBIN A1C LEVEL <7.0%: ICD-10-PCS | Mod: CPTII,S$GLB,, | Performed by: INTERNAL MEDICINE

## 2023-07-20 PROCEDURE — 99214 OFFICE O/P EST MOD 30 MIN: CPT | Mod: S$GLB,,, | Performed by: INTERNAL MEDICINE

## 2023-07-20 PROCEDURE — 1160F PR REVIEW ALL MEDS BY PRESCRIBER/CLIN PHARMACIST DOCUMENTED: ICD-10-PCS | Mod: CPTII,S$GLB,, | Performed by: INTERNAL MEDICINE

## 2023-07-20 PROCEDURE — 3044F HG A1C LEVEL LT 7.0%: CPT | Mod: CPTII,S$GLB,, | Performed by: INTERNAL MEDICINE

## 2023-07-20 PROCEDURE — 1159F MED LIST DOCD IN RCRD: CPT | Mod: CPTII,S$GLB,, | Performed by: INTERNAL MEDICINE

## 2023-07-20 PROCEDURE — 3074F SYST BP LT 130 MM HG: CPT | Mod: CPTII,S$GLB,, | Performed by: INTERNAL MEDICINE

## 2023-07-20 PROCEDURE — 3078F PR MOST RECENT DIASTOLIC BLOOD PRESSURE < 80 MM HG: ICD-10-PCS | Mod: CPTII,S$GLB,, | Performed by: INTERNAL MEDICINE

## 2023-07-20 PROCEDURE — 1160F RVW MEDS BY RX/DR IN RCRD: CPT | Mod: CPTII,S$GLB,, | Performed by: INTERNAL MEDICINE

## 2023-07-20 NOTE — PROGRESS NOTES
Subjective:       Patient ID: Veronique Vick is a 32 y.o. female.    Chief Complaint::  B20    Her Source of exposure learned that he was positive last November and was on treatment but did not reveal his diagnosis until after their last encounter in April 2020.   Her last test was probably during pregnancy for her 2 year old who exhibits no signs of infection .   Recent serologies were reviewed.   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 The Genosure prime was not completed and will be redrawn. We reviewed the [facility] guidelines for treatment in pregnant women and have come to a tentative agreement to use Triumeq. She learned that the source of her infection was on Biktarvy at the time.    12/7/20: tolerating triumeq, as long as she takes it with food. I have not received records from prior GYN, but can see a fair amount in care everywhere. She has 4 month pap repeated this week. She took her flu vaccine. Having anxiety, regarding her ex , requests something for anxiety, non habit forming. She agrees to Buspar.     3/8/21: tolerating triumeq, its a large tablet. Has a significant other, thinking about getting . Yevgeniy. Thinking about having children.  She is interested in him receiving prep.  She is taking herself in her partner.  She understands that she will see a maternal/fetal medicine. Her OB is Dr. Fields and she [facility] deliver [facility]    7/12/21: interim reviewed. Reviewed lab 6/2021 lab.  Doing extremely well.  Having a good weight loss, dieting, exercising    1/13/22: interim reviewed. PAP still abnormal but HPV negative. Reviewed dec lab.  Had zoster and  brief neuralgia left thoracic, under a very stressful circumstance. Resolved with valtrex.  7/20/22: doing well. Had a mild UTI 2 weeks ago. Had some LBP which is resolved.    1/18/23: well in general. For about a month, has been having a cough and wheezing. No history of asthma. Her sister has a new dog and she suspects that this may be the source of allergen. She is taking benadryl at night with some improvement. She gave herself 10d of augmentin without benefit. Has not used a nasal steroid. She has budesonide nasal spray at home.      CXR sinus xray and cbc are fine except 15% eosinophils. No rashes and no diarrhea  Interested in artificial insemination now. Having abnormal PAPs associated with prior HPV. No recent signs of HPV just low grade dysplasia. She sees Dr. Fields.     7/20/23: doing well medically. Stress with family issues. Has tried artificial insemination from donor, and IUI , both unsuccessful, seen by MFM, and plan in place if she becomes pregnant.   Taking one a day prenatal, 5 mg folic acid, B12, and she takes vitamin D once a week and all separate from Triumeq. Reviewed labs, all good, no eosinophils, strongy Ab negative.     Current Outpatient Medications:     abacavir-dolutegravir-lamivud (TRIUMEQ) 600- mg Tab, Take 1 tablet by mouth once daily., Disp: 90 tablet, Rfl: 1    buPROPion (WELLBUTRIN XL) 150 MG TB24 tablet, Take 1 tablet (150 mg total) by mouth once daily., Disp: 30 tablet, Rfl: 11    ergocalciferol (ERGOCALCIFEROL) 50,000 unit Cap, Take 1 capsule (50,000 Units total) by mouth every 7 days., Disp: 12 capsule, Rfl: 3    promethazine (PHENERGAN) 12.5 MG Tab, Take 12.5 mg by mouth every 4 (four) hours as needed., Disp: , Rfl:     sumatriptan (IMITREX) 100 MG tablet, 1 tab oral may repeat every 2 hrs.. Max 2 tabs a day, Disp: 9 tablet, Rfl: 3  Review of patient's allergies indicates:   Allergen Reactions    Eggs [egg derived] Nausea Only    Ondansetron Dermatitis    Sulfa  (sulfonamide antibiotics) Nausea Only    Sulfamethoxazole-trimethoprim Diarrhea     Past Medical History:   Diagnosis Date    Wrist fracture     left   abnormal PAP    Multiple colposcopies, most recent in may 2023  Past Surgical History:   Procedure Laterality Date    colposcopy      multiple    ORIF RADIUS & ULNA FRACTURES Left 2016    WISDOM TOOTH EXTRACTION       Social History     Socioeconomic History    Marital status: Single   Tobacco Use    Smoking status: Never    Smokeless tobacco: Never   Substance and Sexual Activity    Alcohol use: Yes     Alcohol/week: 0.0 standard drinks     Comment: social    Drug use: No    Sexual activity: Yes     Partners: Male     Birth control/protection: I.U.D.     Social Determinants of Health     Financial Resource Strain: Low Risk     Difficulty of Paying Living Expenses: Not hard at all   Food Insecurity: No Food Insecurity    Worried About Running Out of Food in the Last Year: Never true    Ran Out of Food in the Last Year: Never true   Transportation Needs: No Transportation Needs    Lack of Transportation (Medical): No    Lack of Transportation (Non-Medical): No   Physical Activity: Insufficiently Active    Days of Exercise per Week: 3 days    Minutes of Exercise per Session: 40 min   Stress: Stress Concern Present    Feeling of Stress : Rather much   Social Connections: Unknown    Frequency of Communication with Friends and Family: More than three times a week    Frequency of Social Gatherings with Friends and Family: More than three times a week    Active Member of Clubs or Organizations: No    Attends Club or Organization Meetings: Patient refused    Marital Status: Never    Housing Stability: Low Risk     Unable to Pay for Housing in the Last Year: No    Number of Places Lived in the Last Year: 1    Unstable Housing in the Last Year: No     Family History   Problem Relation Age of Onset    Hyperlipidemia Father     Hypertension Father        Travel History:  "[place], [place], [place]cruise[place]  Vaccine History: Hep B series (6th grade), Tdap 2/2018, yearly flu vaccine(egg allergy), zostavax 2013, COVID x 3 2021, menactra #1 2022,   Advanced Directive:   Safer Sex:  Sig other, protected, and partner on PreP  Gyn: (Dr. Fields) UTD, repeat PAP 4/2022, 10/22 abnormal but improved, 5/2023 with CIN1  Mammogram:  Bone Density:   Colonoscopy:    Review of Systems      Constitutional: No fever, chills, sweats, fatigue, weakness, weight loss.      Eyes: No change in vision, loss of vision,  . UTD eye exam    ENT: No sinus drainage, sore throat, mouth pain, or lesions. UTD dental exam, but needs cavity filled. Father is a dentist    Cardiovascular: No chest pain, CANNON, palpitations or pedal edema    Respiratory: No shortness of breath, CANNON, cough,       Gastrointestinal: No abdominal pain, nausea, vomiting, diarrhea, no longer taking linzess    Genitourinary: No dysuria, hematuria, , or urethritis    Musculoskeletal: No new pain, joint swelling, or injuries    Integumentary: No new rashes, lesions, or wounds    Neurological: No dizziness, vertigo, unusual headaches, neuropathy, or falls    Psychiatric:  No anxiety, depression, no memory loss,   or substance abuse . Coping with family issues well    Endocrine: not diabetic.  Thyroid normal    Lymphatic: No lymphadenopathy, blood loss, anemia, or malignancy    Objective:      Blood pressure 114/62, pulse (!) 55, temperature 98.6 °F (37 °C), height 5' 6" (1.676 m), weight 84.2 kg (185 lb 9.6 oz), SpO2 99 %. Body mass index is 29.96 kg/m².  Physical Exam    126/96    General: Alert and attentive, cooperative and in no distress    Eyes: Pupils equal, round, reactive to light, anicteric, EOMI    Neck: Supple, non-tender, no thyromegaly or masses    ENT: EAC patent, TM normal, nares patent, no oral lesions, teeth in good condition, no thrush    Cardiovascular: Regular rate and rhythm, no murmurs, rubs, or gallop    Respiratory: Lungs clear " without wheezes, no rales, rub or rhonci    Gastrointestinal:  Soft, nontender, no masses, no guarding, bowel sounds normal    Genitourinary:  No flank tenderness    Vascular: No peripheral edema, phlebitis, pulses normal. Warm and well perfused    Musculoskeletal: Ambulates without difficulty, no acute arthritis, synovitis or myositis. Normal muscle bulk and strength    Integumentary: Skin without rashes, lesions, or wounds    AnusRectum:      Neurological: Normal LOC, cranial nerves, speech, reflexes, normal gait    Psychiatric: Normal mood, speech, demeanor    Lymphatic: No cervical, supraclavicular, axillary  or inguinal lymphadenopathy      Wound:     HIV Table:   Toxo IgG  8/17/20 positive  [id number]  8/17/20 negative  RPR   6/9/23  Non reactive  Hepatitis A IgG 8/12/20 negative  Hepatitis B sAg 8/12/20 negative  Hepatitis B sAb 8/12/20 positive  Hepatitis B cAb 8/12/20 negative  Hepatitis C Ab  7/21/22 negative  Chl/GC  Vitamin D  6/9/23  47  TB gold  7/21/22 negative  Genotype  9/23/20  genotypr plus and integrase all neg  CD4 initial  8/17/20 Over 1000  Initial RNA  8/17/20 1500      Recent Diagnostics: reviewed all of the lab results       Assessment and Plan:           HIV infection, unspecified symptom status  -     CBC Auto Differential; Future; Expected date: 12/01/2023  -     Comprehensive Metabolic Panel; Future; Expected date: 12/01/2023  -     HIV RNA, Quantitative, PCR; Future; Expected date: 12/01/2023  -     Quantiferon Gold TB; Future; Expected date: 12/01/2023    Encounter for long-term (current) use of medications    Vitamin D deficiency  -     Vitamin D; Future; Expected date: 12/01/2023         Same medications  Return in December with labs ahead  This note was created using Dragon voice recognition software that occasionally misinterpreted phrases or words.

## 2023-08-04 ENCOUNTER — SPECIALTY PHARMACY (OUTPATIENT)
Dept: PHARMACY | Facility: CLINIC | Age: 33
End: 2023-08-04
Payer: COMMERCIAL

## 2023-08-04 NOTE — TELEPHONE ENCOUNTER
Specialty Pharmacy - Refill Coordination    Specialty Medication Orders Linked to Encounter      Flowsheet Row Most Recent Value   Medication #1 abacavir-dolutegravir-lamivud (TRIUMEQ) 600- mg Tab (Order#[id number], Rx#[id number])            Refill Questions - Documented Responses      Flowsheet Row Most Recent Value   Patient Availability and HIPAA Verification    Does patient want to proceed with activity? Yes   HIPAA/medical authority confirmed? Yes   Relationship to patient of person spoken to? Self   Refill Screening Questions    Changes to allergies? No   Changes to medications? No   New conditions since last clinic visit? No   Unplanned office visit, urgent care, ED, or hospital admission in the last 4 weeks? No   How does patient/caregiver feel medication is working? Good   Financial problems or insurance changes? No   How many doses of your specialty medications were missed in the last 4 weeks? 0   Would patient like to speak to a pharmacist? No   When does the patient need to receive the medication? 08/10/23   Refill Delivery Questions    How will the patient receive the medication? Mail   When does the patient need to receive the medication? 08/10/23   Shipping Address Home   Address in [facility] confirmed and updated if neccessary? Yes   Expected Copay ($) 0   Is the patient able to afford the medication copay? Yes   Payment Method zero copay   Days supply of Refill 30   Refill activity completed? Yes   Refill activity plan Refill scheduled   Shipment/Pickup Date: 08/08/23            Current Outpatient Medications   Medication Sig    abacavir-dolutegravir-lamivud (TRIUMEQ) 600- mg Tab Take 1 tablet by mouth once daily.    buPROPion (WELLBUTRIN XL) 150 MG TB24 tablet Take 1 tablet (150 mg total) by mouth once daily.    ergocalciferol (ERGOCALCIFEROL) 50,000 unit Cap Take 1 capsule (50,000 Units total) by mouth every 7 days.    promethazine (PHENERGAN) 12.5 MG Tab Take 12.5 mg by mouth  every 4 (four) hours as needed.    sumatriptan (IMITREX) 100 MG tablet 1 tab oral may repeat every 2 hrs.. Max 2 tabs a day   Last reviewed on 7/20/2023 11:01 AM by Edelmira Ledesma MD    Review of patient's allergies indicates:   Allergen Reactions    Eggs [egg derived] Nausea Only    Ondansetron Dermatitis    Sulfa (sulfonamide antibiotics) Nausea Only    Sulfamethoxazole-trimethoprim Diarrhea    Last reviewed on  7/20/2023 10:49 AM by Kathy aSmuels      Tasks added this encounter   No tasks added.   Tasks due within next 3 months   No tasks due.     [facility]  [address] [facility] [address]  Phone: [phone]  Fax: [phone]

## 2023-08-15 ENCOUNTER — OFFICE VISIT (OUTPATIENT)
Dept: FAMILY MEDICINE | Facility: CLINIC | Age: 33
End: 2023-08-15
Payer: COMMERCIAL

## 2023-08-15 ENCOUNTER — HOSPITAL ENCOUNTER (OUTPATIENT)
Dept: RADIOLOGY | Facility: CLINIC | Age: 33
Discharge: HOME | End: 2023-08-15
Attending: FAMILY MEDICINE
Payer: COMMERCIAL

## 2023-08-15 VITALS
WEIGHT: 180.19 LBS | RESPIRATION RATE: 18 BRPM | HEIGHT: 66 IN | DIASTOLIC BLOOD PRESSURE: 70 MMHG | SYSTOLIC BLOOD PRESSURE: 122 MMHG | OXYGEN SATURATION: 98 % | BODY MASS INDEX: 28.96 KG/M2 | HEART RATE: 82 BPM

## 2023-08-15 DIAGNOSIS — R05.9 COUGH IN ADULT PATIENT: Primary | ICD-10-CM

## 2023-08-15 PROBLEM — R29.3 POSTURE ABNORMALITY: Status: RESOLVED | Noted: 2022-04-05 | Resolved: 2023-08-15

## 2023-08-15 LAB

## 2023-08-15 PROCEDURE — 1159F PR MEDICATION LIST DOCUMENTED IN MEDICAL RECORD: ICD-10-PCS | Mod: ,,, | Performed by: FAMILY MEDICINE

## 2023-08-15 PROCEDURE — 3044F PR MOST RECENT HEMOGLOBIN A1C LEVEL <7.0%: ICD-10-PCS | Mod: ,,, | Performed by: FAMILY MEDICINE

## 2023-08-15 PROCEDURE — 99214 OFFICE O/P EST MOD 30 MIN: CPT | Mod: ,,, | Performed by: FAMILY MEDICINE

## 2023-08-15 PROCEDURE — 71046 X-RAY EXAM CHEST 2 VIEWS: CPT | Mod: TC,,, | Performed by: RADIOLOGY

## 2023-08-15 PROCEDURE — 3074F SYST BP LT 130 MM HG: CPT | Mod: ,,, | Performed by: FAMILY MEDICINE

## 2023-08-15 PROCEDURE — 87798 DETECT AGENT NOS DNA AMP: CPT | Performed by: FAMILY MEDICINE

## 2023-08-15 PROCEDURE — 3078F DIAST BP <80 MM HG: CPT | Mod: ,,, | Performed by: FAMILY MEDICINE

## 2023-08-15 PROCEDURE — 71046 X-RAY EXAM CHEST 2 VIEWS: CPT | Mod: 26,,, | Performed by: FAMILY MEDICINE

## 2023-08-15 PROCEDURE — 3008F PR BODY MASS INDEX (BMI) DOCUMENTED: ICD-10-PCS | Mod: ,,, | Performed by: FAMILY MEDICINE

## 2023-08-15 PROCEDURE — 3008F BODY MASS INDEX DOCD: CPT | Mod: ,,, | Performed by: FAMILY MEDICINE

## 2023-08-15 PROCEDURE — 3044F HG A1C LEVEL LT 7.0%: CPT | Mod: ,,, | Performed by: FAMILY MEDICINE

## 2023-08-15 PROCEDURE — 71046 XR CHEST PA AND LATERAL: ICD-10-PCS | Mod: 26,,, | Performed by: FAMILY MEDICINE

## 2023-08-15 PROCEDURE — 99214 PR OFFICE/OUTPT VISIT, EST, LEVL IV, 30-39 MIN: ICD-10-PCS | Mod: ,,, | Performed by: FAMILY MEDICINE

## 2023-08-15 PROCEDURE — 1159F MED LIST DOCD IN RCRD: CPT | Mod: ,,, | Performed by: FAMILY MEDICINE

## 2023-08-15 PROCEDURE — 71046 XR CHEST PA AND LATERAL: ICD-10-PCS | Mod: TC,,, | Performed by: RADIOLOGY

## 2023-08-15 PROCEDURE — 3078F PR MOST RECENT DIASTOLIC BLOOD PRESSURE < 80 MM HG: ICD-10-PCS | Mod: ,,, | Performed by: FAMILY MEDICINE

## 2023-08-15 PROCEDURE — 3074F PR MOST RECENT SYSTOLIC BLOOD PRESSURE < 130 MM HG: ICD-10-PCS | Mod: ,,, | Performed by: FAMILY MEDICINE

## 2023-08-15 NOTE — PROGRESS NOTES
Subjective:       Patient ID: Veronique Vick is a 32 y.o. female.    Chief Complaint: Chest Pain    Ms Vick is here today with cough and congestion. Cough is occasionally productive, but she feels it is allergy related, but is going out of town this weekend and wants to be sure she is not contagious. She recently had exposure to cat hair, which caused an allergic type reaction with sneezing, cough and headaches. She also has some chest pain, which is reproducible, and likely due to the coughing. A CXR and a respiratory panel to rule out infective process.     Chest Pain   Associated symptoms include a cough. Pertinent negatives include no shortness of breath.     Review of Systems   HENT:  Positive for ear pain, postnasal drip, rhinorrhea, sinus pressure, sneezing and sore throat.    Respiratory:  Positive for cough. Negative for shortness of breath and wheezing.    Cardiovascular:  Positive for chest pain.       Patient Active Problem List   Diagnosis    Pap smear of cervix with ASCUS, cannot exclude HGSIL    Migraine    Irritable bowel syndrome    Decreased strength    Muscle tightness       Objective:      Physical Exam  Constitutional:       Appearance: Normal appearance.   HENT:      Head: Normocephalic and atraumatic.      Mouth/Throat:      Mouth: Mucous membranes are moist.   Eyes:      Extraocular Movements: Extraocular movements intact.      Pupils: Pupils are equal, round, and reactive to light.   Cardiovascular:      Rate and Rhythm: Normal rate and regular rhythm.      Pulses: Normal pulses.      Heart sounds: Normal heart sounds.   Pulmonary:      Effort: Pulmonary effort is normal.      Breath sounds: Normal breath sounds.   Skin:     General: Skin is warm.   Neurological:      General: No focal deficit present.      Mental Status: She is alert and oriented to person, place, and time. Mental status is at baseline.   Psychiatric:         Mood and Affect: Mood normal.         Behavior: Behavior  "normal.         Lab Results   Component Value Date    WBC 5.52 06/19/2023    HGB 12.6 06/19/2023    HCT 37.0 06/19/2023     06/19/2023    CHOL 208 (H) 10/04/2022    TRIG 76 10/04/2022    HDL 64 10/04/2022    ALT 43 06/19/2023    AST 24 06/19/2023     06/19/2023    K 3.7 06/19/2023     06/19/2023    CREATININE 0.9 06/19/2023    BUN 7 06/19/2023    CO2 23 06/19/2023    TSH 0.421 11/15/2022    HGBA1C 5.0 04/17/2023     The ASCVD Risk score (Willard DK, et al., 2019) failed to calculate for the following reasons:    The 2019 ASCVD risk score is only valid for ages 40 to 79  Visit Vitals  /70 (BP Location: Left arm, Patient Position: Sitting, BP Method: Medium (Manual))   Pulse 82   Resp 18   Ht 5' 6" (1.676 m)   Wt 81.7 kg (180 lb 3.2 oz)   LMP 08/04/2023 (Exact Date)   SpO2 98%   BMI 29.09 kg/m²      Assessment:       1. Cough in adult patient        Plan:       1. Cough in adult patient  -     Respiratory Infection Panel (PCR), Nasopharyngeal; Future; Expected date: 08/15/2023  -     X-Ray Chest PA And Lateral; Future; Expected date: 08/15/2023       Follow up if symptoms worsen or fail to improve.      Future Appointments       Date Provider Specialty Appt Notes    8/15/2023 Elizabet Ruffin MD Family Medicine chest pain    8/15/2023  Radiology .    9/5/2023 Yevgeniy Fields MD Obstetrics and Gynecology pap smear             "

## 2023-08-31 ENCOUNTER — PATIENT MESSAGE (OUTPATIENT)
Dept: ADMINISTRATIVE | Facility: OTHER | Age: 33
End: 2023-08-31
Payer: COMMERCIAL

## 2023-09-05 ENCOUNTER — OFFICE VISIT (OUTPATIENT)
Dept: OBSTETRICS AND GYNECOLOGY | Facility: CLINIC | Age: 33
End: 2023-09-05
Payer: COMMERCIAL

## 2023-09-05 VITALS
SYSTOLIC BLOOD PRESSURE: 120 MMHG | BODY MASS INDEX: 29.17 KG/M2 | WEIGHT: 185.88 LBS | HEIGHT: 67 IN | DIASTOLIC BLOOD PRESSURE: 70 MMHG

## 2023-09-05 DIAGNOSIS — R87.612 LGSIL ON PAP SMEAR OF CERVIX: Primary | ICD-10-CM

## 2023-09-05 PROCEDURE — 3078F DIAST BP <80 MM HG: CPT | Mod: CPTII,S$GLB,, | Performed by: OBSTETRICS & GYNECOLOGY

## 2023-09-05 PROCEDURE — 3044F PR MOST RECENT HEMOGLOBIN A1C LEVEL <7.0%: ICD-10-PCS | Mod: CPTII,S$GLB,, | Performed by: OBSTETRICS & GYNECOLOGY

## 2023-09-05 PROCEDURE — 3078F PR MOST RECENT DIASTOLIC BLOOD PRESSURE < 80 MM HG: ICD-10-PCS | Mod: CPTII,S$GLB,, | Performed by: OBSTETRICS & GYNECOLOGY

## 2023-09-05 PROCEDURE — 3074F SYST BP LT 130 MM HG: CPT | Mod: CPTII,S$GLB,, | Performed by: OBSTETRICS & GYNECOLOGY

## 2023-09-05 PROCEDURE — 3008F BODY MASS INDEX DOCD: CPT | Mod: CPTII,S$GLB,, | Performed by: OBSTETRICS & GYNECOLOGY

## 2023-09-05 PROCEDURE — 3044F HG A1C LEVEL LT 7.0%: CPT | Mod: CPTII,S$GLB,, | Performed by: OBSTETRICS & GYNECOLOGY

## 2023-09-05 PROCEDURE — 3008F PR BODY MASS INDEX (BMI) DOCUMENTED: ICD-10-PCS | Mod: CPTII,S$GLB,, | Performed by: OBSTETRICS & GYNECOLOGY

## 2023-09-05 PROCEDURE — 99213 PR OFFICE/OUTPT VISIT, EST, LEVL III, 20-29 MIN: ICD-10-PCS | Mod: S$GLB,,, | Performed by: OBSTETRICS & GYNECOLOGY

## 2023-09-05 PROCEDURE — 1159F MED LIST DOCD IN RCRD: CPT | Mod: CPTII,S$GLB,, | Performed by: OBSTETRICS & GYNECOLOGY

## 2023-09-05 PROCEDURE — 99213 OFFICE O/P EST LOW 20 MIN: CPT | Mod: S$GLB,,, | Performed by: OBSTETRICS & GYNECOLOGY

## 2023-09-05 PROCEDURE — 99999 PR PBB SHADOW E&M-EST. PATIENT-LVL III: ICD-10-PCS | Mod: PBBFAC,,, | Performed by: OBSTETRICS & GYNECOLOGY

## 2023-09-05 PROCEDURE — 3074F PR MOST RECENT SYSTOLIC BLOOD PRESSURE < 130 MM HG: ICD-10-PCS | Mod: CPTII,S$GLB,, | Performed by: OBSTETRICS & GYNECOLOGY

## 2023-09-05 PROCEDURE — 99999 PR PBB SHADOW E&M-EST. PATIENT-LVL III: CPT | Mod: PBBFAC,,, | Performed by: OBSTETRICS & GYNECOLOGY

## 2023-09-05 PROCEDURE — 1159F PR MEDICATION LIST DOCUMENTED IN MEDICAL RECORD: ICD-10-PCS | Mod: CPTII,S$GLB,, | Performed by: OBSTETRICS & GYNECOLOGY

## 2023-09-05 PROCEDURE — 87624 HPV HI-RISK TYP POOLED RSLT: CPT | Performed by: OBSTETRICS & GYNECOLOGY

## 2023-09-05 PROCEDURE — 88175 CYTOPATH C/V AUTO FLUID REDO: CPT | Performed by: OBSTETRICS & GYNECOLOGY

## 2023-09-05 NOTE — PROGRESS NOTES
"Chief Complaint   Patient presents with    Repeat PAP       History of Present Illness   32 y.o.  female   patient presents today for repeat PAP, long term mild abnormality,unsure about future fertility, counseled.     PAP= LGSIL 2020, neg colpo  PAP #1 2020 = WNL (new dx HIV)  PAP #2= 21= ASCUS  PAP #3= 2021= ASCUS w pos HPV  PAP#4= 21= lgsil  Liletta IUD placed 2020  PAP #5= 2022 ASCUS w neg HPV  PAP #6= 2022, ascus w neg HR-HPV  PAP #7(23)= LGSIL   colpobx= ROMINA-1  PAP #1(today   No longer actively trying to cvonceive.       Past medical and surgical history reviewed.   I have reviewed the patient's medical history in detail and updated the computerized patient record.    Review of patient's allergies indicates:   Allergen Reactions    Eggs [egg derived] Nausea Only    Ondansetron Dermatitis    Sulfa (sulfonamide antibiotics) Nausea Only    Sulfamethoxazole-trimethoprim Diarrhea         Review of Systems -   GEN ROS:   Breast ROS: negative for breast lumps  Genito-Urinary ROS: no dysuria, trouble voiding, or hematuria      Physical Examination:  /70 (BP Location: Right arm, Patient Position: Sitting)   Ht 5' 7" (1.702 m)   Wt 84.3 kg (185 lb 13.6 oz)   LMP 2023 (Exact Date)   BMI 29.11 kg/m²    Constitutional: She appears alert and responsive. She appears well-developed, well-groomed, and well-nourished. No distress. Normal weight  HENT:   Head: Normocephalic and atraumatic.   Eyes: Conjunctivae and EOM are normal. No scleral icterus.   Neck: Symmetrical. Normal range of motion. Neck supple. No tracheal deviation present.  Cardiovascular: Normal rate, no rhythm abnormality noted. Extremities without swelling or edema, warm.    Pulmonary/Chest: Normal respiratory Effort. No distress or retractions. She exhibits no tenderness.  Breasts: are symmetrical.   Right breast exhibits no inverted nipple, no mass, no nipple discharge, no skin change and no " tenderness.   Left breast exhibits no inverted nipple, no mass, no nipple discharge, no skin change and no tenderness.  Abdominal: Soft. She exhibits no distension, hernias or masses. There is no tenderness. No enlargement of liver edge or spleen.  There is no rebound and no guarding.   Genitourinary:    External rectal exam shows no thrombosed external hemorrhoids, no lesions.     Pelvic exam was performed with patient supine.   No labial fusion, and symmetrical.    There is no rash, lesion or injury on the right labia.   There is no rash, lesion or injury on the left labia.   No bleeding and no signs of injury around the vaginal introitus, urethral meatus is normal size and without prolapse or lesions, urethra well supported. The cervix is visualized with no discharge, lesions or friability.   No vaginal discharge found.    No significant Cystocele, Enterocele or rectocele, and cervix and uterus well supported.   Bimanual exam:   The urethra is normal to palpation and there are no palpable vaginal wall masses.   Uterus is not deviated, not enlarged, not fixed, normal shape and not tender.   Cervix exhibits no motion tenderness.    Right adnexum displays no mass or nodularity and no tenderness.   Left adnexum displays no mass or nodularity and no tenderness.  Musculoskeletal: Normal range of motion.   Neurological: She is alert and oriented to person, place, and time. Coordination normal.   Skin: Skin is warm and dry. She is not diaphoretic. No rashes, lesions or ulcers.   Psychiatric: She has a normal mood and affect, oriented to person, place, and time.            Assessment:  Normal e3xam  PAP #!, lgsil  No diagnosis found.    Plan:  PAP #1, follow up 4 months, PAP #2  Patient informed will be contacted with results within 2 weeks. Encouraged to please call back or email if she has not heard from us by then.

## 2023-09-07 ENCOUNTER — OFFICE VISIT (OUTPATIENT)
Dept: FAMILY MEDICINE | Facility: CLINIC | Age: 33
End: 2023-09-07
Payer: COMMERCIAL

## 2023-09-07 VITALS
OXYGEN SATURATION: 99 % | TEMPERATURE: 98 F | HEART RATE: 75 BPM | HEIGHT: 67 IN | BODY MASS INDEX: 28.51 KG/M2 | DIASTOLIC BLOOD PRESSURE: 68 MMHG | SYSTOLIC BLOOD PRESSURE: 122 MMHG | WEIGHT: 181.63 LBS

## 2023-09-07 DIAGNOSIS — G43.829 MENSTRUAL MIGRAINE WITHOUT STATUS MIGRAINOSUS, NOT INTRACTABLE: Primary | ICD-10-CM

## 2023-09-07 PROCEDURE — 3074F SYST BP LT 130 MM HG: CPT | Mod: ICN,,,

## 2023-09-07 PROCEDURE — 3078F DIAST BP <80 MM HG: CPT | Mod: ICN,,,

## 2023-09-07 PROCEDURE — 3008F BODY MASS INDEX DOCD: CPT | Mod: ICN,,,

## 2023-09-07 PROCEDURE — 96372 THER/PROPH/DIAG INJ SC/IM: CPT | Mod: ICN,,,

## 2023-09-07 PROCEDURE — 1160F PR REVIEW ALL MEDS BY PRESCRIBER/CLIN PHARMACIST DOCUMENTED: ICD-10-PCS | Mod: ICN,,,

## 2023-09-07 PROCEDURE — 1160F RVW MEDS BY RX/DR IN RCRD: CPT | Mod: ICN,,,

## 2023-09-07 PROCEDURE — 1159F PR MEDICATION LIST DOCUMENTED IN MEDICAL RECORD: ICD-10-PCS | Mod: ICN,,,

## 2023-09-07 PROCEDURE — 3008F PR BODY MASS INDEX (BMI) DOCUMENTED: ICD-10-PCS | Mod: ICN,,,

## 2023-09-07 PROCEDURE — 1159F MED LIST DOCD IN RCRD: CPT | Mod: ICN,,,

## 2023-09-07 PROCEDURE — 96372 PR INJECTION,THERAP/PROPH/DIAG2ST, IM OR SUBCUT: ICD-10-PCS | Mod: ICN,,,

## 2023-09-07 PROCEDURE — 3044F HG A1C LEVEL LT 7.0%: CPT | Mod: ICN,,,

## 2023-09-07 PROCEDURE — 3044F PR MOST RECENT HEMOGLOBIN A1C LEVEL <7.0%: ICD-10-PCS | Mod: ICN,,,

## 2023-09-07 PROCEDURE — 3074F PR MOST RECENT SYSTOLIC BLOOD PRESSURE < 130 MM HG: ICD-10-PCS | Mod: ICN,,,

## 2023-09-07 PROCEDURE — 99214 OFFICE O/P EST MOD 30 MIN: CPT | Mod: 25,ICN,,

## 2023-09-07 PROCEDURE — 99214 PR OFFICE/OUTPT VISIT, EST, LEVL IV, 30-39 MIN: ICD-10-PCS | Mod: 25,ICN,,

## 2023-09-07 PROCEDURE — 3078F PR MOST RECENT DIASTOLIC BLOOD PRESSURE < 80 MM HG: ICD-10-PCS | Mod: ICN,,,

## 2023-09-07 RX ORDER — UBROGEPANT 50 MG/1
50 TABLET ORAL
Qty: 16 TABLET | Refills: 2 | Status: SHIPPED | OUTPATIENT
Start: 2023-09-07 | End: 2023-11-21

## 2023-09-07 RX ORDER — CYCLOBENZAPRINE HCL 10 MG
10 TABLET ORAL 3 TIMES DAILY PRN
Qty: 20 TABLET | Refills: 2 | Status: SHIPPED | OUTPATIENT
Start: 2023-09-07

## 2023-09-07 RX ORDER — KETOROLAC TROMETHAMINE 30 MG/ML
30 INJECTION, SOLUTION INTRAMUSCULAR; INTRAVENOUS
Status: COMPLETED | OUTPATIENT
Start: 2023-09-07 | End: 2023-09-07

## 2023-09-07 RX ADMIN — KETOROLAC TROMETHAMINE 30 MG: 30 INJECTION, SOLUTION INTRAMUSCULAR; INTRAVENOUS at 10:09

## 2023-09-07 NOTE — PROGRESS NOTES
Subjective:       Patient ID: Veronique Vick is a 32 y.o. female.    Chief Complaint: Headache (Right side behind eye x 6 days . Using Imitrex often. Some mild nausea Sunday. No visual change. Denies any dizziness some light sensitive.)    Patient presents to the clinic with complaint of migraine.     Migraine started 6 days ago. She has tried taking Imitrex, Ibuprofen, Tylenol, Excedrin, ASA, Phenergan, and Fioricet without relief.     Has no other complaints or concerns today.     Patient educated on plan of care, verbalized understanding.             Migraine   This is a recurrent problem. The current episode started in the past 7 days. The problem occurs constantly. The problem has been unchanged. The pain is located in the Right unilateral and temporal region. The pain does not radiate. The pain quality is similar to prior headaches. The quality of the pain is described as throbbing and pulsating. The pain is at a severity of 4/10 (was a 10/10 over the weekend). Associated symptoms include nausea and photophobia. Pertinent negatives include no abdominal pain, coughing, dizziness, ear pain, eye pain, eye redness, fever, sinus pressure, sore throat or vomiting. The symptoms are aggravated by menstrual cycle. She has tried cold packs, oral narcotics, acetaminophen, Excedrin, darkened room, NSAIDs and triptans for the symptoms. The treatment provided no relief. Her past medical history is significant for migraine headaches.     Review of Systems   Constitutional:  Negative for activity change, appetite change, chills, diaphoresis and fever.   HENT:  Negative for congestion, ear pain, postnasal drip, sinus pressure, sneezing and sore throat.    Eyes:  Positive for photophobia. Negative for pain, discharge, redness and itching.   Respiratory:  Negative for apnea, cough, chest tightness, shortness of breath and wheezing.    Cardiovascular:  Negative for chest pain and leg swelling.   Gastrointestinal:  Positive  for nausea. Negative for abdominal distention, abdominal pain, constipation, diarrhea and vomiting.   Genitourinary:  Negative for difficulty urinating, dysuria, flank pain and frequency.   Skin:  Negative for color change, rash and wound.   Neurological:  Positive for headaches. Negative for dizziness.   All other systems reviewed and are negative.      Patient Active Problem List   Diagnosis    Pap smear of cervix with ASCUS, cannot exclude HGSIL    Migraine    Irritable bowel syndrome    Decreased strength    Muscle tightness       Objective:      Physical Exam  Vitals and nursing note reviewed.   Constitutional:       General: She is not in acute distress.     Appearance: Normal appearance. She is well-developed.   HENT:      Head: Normocephalic.      Nose: Nose normal.   Eyes:      Conjunctiva/sclera: Conjunctivae normal.      Pupils: Pupils are equal, round, and reactive to light.   Cardiovascular:      Rate and Rhythm: Normal rate.   Pulmonary:      Effort: Pulmonary effort is normal. No respiratory distress.   Musculoskeletal:      Cervical back: Normal range of motion.   Skin:     General: Skin is warm and dry.      Findings: No rash.   Neurological:      Mental Status: She is alert and oriented to person, place, and time.   Psychiatric:         Behavior: Behavior normal.         Lab Results   Component Value Date    WBC 5.52 06/19/2023    HGB 12.6 06/19/2023    HCT 37.0 06/19/2023     06/19/2023    CHOL 208 (H) 10/04/2022    TRIG 76 10/04/2022    HDL 64 10/04/2022    ALT 43 06/19/2023    AST 24 06/19/2023     06/19/2023    K 3.7 06/19/2023     06/19/2023    CREATININE 0.9 06/19/2023    BUN 7 06/19/2023    CO2 23 06/19/2023    TSH 0.421 11/15/2022    HGBA1C 5.0 04/17/2023     The ASCVD Risk score (Dickinson DK, et al., 2019) failed to calculate for the following reasons:    The 2019 ASCVD risk score is only valid for ages 40 to 79  Visit Vitals  /68 (BP Location: Right arm, Patient  "Position: Sitting, BP Method: Medium (Manual))   Pulse 75   Temp 98.1 °F (36.7 °C) (Temporal)   Ht 5' 7" (1.702 m)   Wt 82.4 kg (181 lb 9.6 oz)   LMP 08/31/2023 (Exact Date)   SpO2 99%   BMI 28.44 kg/m²      Assessment:       1. Menstrual migraine without status migrainosus, not intractable        Plan:       1. Menstrual migraine without status migrainosus, not intractable  -     Ambulatory referral/consult to Neurology; Future; Expected date: 09/14/2023  -     ubrogepant (UBRELVY) 50 mg tablet; Take 1 tablet (50 mg total) by mouth as needed for Migraine. If symptoms persist or return, may repeat dose after 2 hours. Maximum: 200 mg per 24 hours  Dispense: 16 tablet; Refill: 2  -     ketorolac injection 30 mg   - The diagnosis, treatment plan, instructions for follow-up and reevaluation as well as ED precautions were discussed and understanding was verbalized. All questions or concerns have been addressed.     Other orders  -     cyclobenzaprine (FLEXERIL) 10 MG tablet; Take 1 tablet (10 mg total) by mouth 3 (three) times daily as needed for Muscle spasms.  Dispense: 20 tablet; Refill: 2       Follow up if symptoms worsen or fail to improve.      Future Appointments       Date Provider Specialty Appt Notes    9/7/2023 Debora Young, NAM Family Medicine headache    1/2/2024 Yevgeniy Fields MD Obstetrics and Gynecology Repeat pap             "

## 2023-09-08 ENCOUNTER — TELEPHONE (OUTPATIENT)
Dept: FAMILY MEDICINE | Facility: CLINIC | Age: 33
End: 2023-09-08

## 2023-09-13 ENCOUNTER — PATIENT MESSAGE (OUTPATIENT)
Dept: OBSTETRICS AND GYNECOLOGY | Facility: CLINIC | Age: 33
End: 2023-09-13
Payer: COMMERCIAL

## 2023-09-18 ENCOUNTER — OFFICE VISIT (OUTPATIENT)
Dept: NEUROLOGY | Facility: CLINIC | Age: 33
End: 2023-09-18
Payer: COMMERCIAL

## 2023-09-18 VITALS
RESPIRATION RATE: 17 BRPM | BODY MASS INDEX: 29.02 KG/M2 | WEIGHT: 184.88 LBS | SYSTOLIC BLOOD PRESSURE: 129 MMHG | TEMPERATURE: 98 F | HEART RATE: 87 BPM | DIASTOLIC BLOOD PRESSURE: 88 MMHG | HEIGHT: 67 IN

## 2023-09-18 DIAGNOSIS — G44.209 TENSION HEADACHE: ICD-10-CM

## 2023-09-18 DIAGNOSIS — M79.18 MYOFASCIAL PAIN: ICD-10-CM

## 2023-09-18 DIAGNOSIS — G43.829 MENSTRUAL MIGRAINE WITHOUT STATUS MIGRAINOSUS, NOT INTRACTABLE: Primary | ICD-10-CM

## 2023-09-18 PROCEDURE — 99205 OFFICE O/P NEW HI 60 MIN: CPT | Mod: S$GLB,,, | Performed by: PHYSICIAN ASSISTANT

## 2023-09-18 PROCEDURE — 3079F PR MOST RECENT DIASTOLIC BLOOD PRESSURE 80-89 MM HG: ICD-10-PCS | Mod: CPTII,S$GLB,, | Performed by: PHYSICIAN ASSISTANT

## 2023-09-18 PROCEDURE — 3044F PR MOST RECENT HEMOGLOBIN A1C LEVEL <7.0%: ICD-10-PCS | Mod: CPTII,S$GLB,, | Performed by: PHYSICIAN ASSISTANT

## 2023-09-18 PROCEDURE — 1160F RVW MEDS BY RX/DR IN RCRD: CPT | Mod: CPTII,S$GLB,, | Performed by: PHYSICIAN ASSISTANT

## 2023-09-18 PROCEDURE — 1160F PR REVIEW ALL MEDS BY PRESCRIBER/CLIN PHARMACIST DOCUMENTED: ICD-10-PCS | Mod: CPTII,S$GLB,, | Performed by: PHYSICIAN ASSISTANT

## 2023-09-18 PROCEDURE — 3008F BODY MASS INDEX DOCD: CPT | Mod: CPTII,S$GLB,, | Performed by: PHYSICIAN ASSISTANT

## 2023-09-18 PROCEDURE — 3079F DIAST BP 80-89 MM HG: CPT | Mod: CPTII,S$GLB,, | Performed by: PHYSICIAN ASSISTANT

## 2023-09-18 PROCEDURE — 99205 PR OFFICE/OUTPT VISIT, NEW, LEVL V, 60-74 MIN: ICD-10-PCS | Mod: S$GLB,,, | Performed by: PHYSICIAN ASSISTANT

## 2023-09-18 PROCEDURE — 3074F PR MOST RECENT SYSTOLIC BLOOD PRESSURE < 130 MM HG: ICD-10-PCS | Mod: CPTII,S$GLB,, | Performed by: PHYSICIAN ASSISTANT

## 2023-09-18 PROCEDURE — 3044F HG A1C LEVEL LT 7.0%: CPT | Mod: CPTII,S$GLB,, | Performed by: PHYSICIAN ASSISTANT

## 2023-09-18 PROCEDURE — 99999 PR PBB SHADOW E&M-EST. PATIENT-LVL V: ICD-10-PCS | Mod: PBBFAC,,, | Performed by: PHYSICIAN ASSISTANT

## 2023-09-18 PROCEDURE — 3074F SYST BP LT 130 MM HG: CPT | Mod: CPTII,S$GLB,, | Performed by: PHYSICIAN ASSISTANT

## 2023-09-18 PROCEDURE — 99999 PR PBB SHADOW E&M-EST. PATIENT-LVL V: CPT | Mod: PBBFAC,,, | Performed by: PHYSICIAN ASSISTANT

## 2023-09-18 PROCEDURE — 1159F MED LIST DOCD IN RCRD: CPT | Mod: CPTII,S$GLB,, | Performed by: PHYSICIAN ASSISTANT

## 2023-09-18 PROCEDURE — 3008F PR BODY MASS INDEX (BMI) DOCUMENTED: ICD-10-PCS | Mod: CPTII,S$GLB,, | Performed by: PHYSICIAN ASSISTANT

## 2023-09-18 PROCEDURE — 1159F PR MEDICATION LIST DOCUMENTED IN MEDICAL RECORD: ICD-10-PCS | Mod: CPTII,S$GLB,, | Performed by: PHYSICIAN ASSISTANT

## 2023-09-18 RX ORDER — PREDNISONE 20 MG/1
TABLET ORAL
Qty: 12 TABLET | Refills: 0 | Status: SHIPPED | OUTPATIENT
Start: 2023-09-18 | End: 2023-11-21

## 2023-09-18 RX ORDER — TOPIRAMATE 25 MG/1
TABLET ORAL
Qty: 120 TABLET | Refills: 11 | Status: SHIPPED | OUTPATIENT
Start: 2023-09-18 | End: 2023-10-02

## 2023-09-18 RX ORDER — RIZATRIPTAN BENZOATE 5 MG/1
TABLET, ORALLY DISINTEGRATING ORAL
Qty: 10 TABLET | Refills: 5 | Status: SHIPPED | OUTPATIENT
Start: 2023-09-18

## 2023-09-18 NOTE — PATIENT INSTRUCTIONS
"        Consider tracking your headaches       To reduce headaches:  Topamax (topirimate) approx 2 hours before bed every night:  Week 1: 1 pill  Week2: 2 pills  Week 3: 3 pills  Week 4: 4 pills  Consider the cefaly device, www.cefaly.us, please research, this is TENs unit designed in [place] and was FDA approved in the early 2010s for migraines.   Suggested that the patient research out OTC supplements (stressed NOT FDA regulated and should take at own risk).    To help prevent a headache:   Petadolex (butterbur root)  Vitamin B2 (riboflavin)  CoQ10  Magnesium  "Migraeeze" which is petadolex/Vitamin B2/yanelis  "Dolovent" which is magnesium/Vitamin B2/coq10    * all of the above can be found locally in a variety of shops or on the internet           To abort headaches:  Stop imitrex  Stop nurtec  Try the maxalt (rizatriptan) 1 melt at onset headache, second melt 2 hours later if needed, no more than 2 melts day/3 days use in week     To factory reset:  On full stomach at breakfast time only, deltasone (prednisone) taper: 3 pills for 2 days, 2 pills for 2 days, 1 pill for 2 days then done         "

## 2023-09-18 NOTE — PROGRESS NOTES
[facility]  [address] [place] [address]  Phone:[phone]  Fax: [phone]   New Patient Consultation    Patient Name: Veronique Vick  : 1990  MRN:  [id number]  Today: 2023   chief complaint: Migraine    PCP: Veronica, Primary Doctor.       Assessment:   Veronique Vick is a 32 y.o. right handed female with a PMHx of:  migraines, IBS,  and exposure to HIV (historically negative testing, followed by ID), at the request of PCP for migraines. Appears she has menstrual migraine with some tension HA/myofascial pain.       Review:    ICD-10-CM ICD-9-CM   1. Menstrual migraine without status migrainosus, not intractable  G43.829 346.40   2. Tension headache  G44.209 307.81   3. Myofascial pain  M79.18 729.1         Plan:   Discussed realistic goals of care with patient at length. Discussed medication options, need for lifestyle adjustment. Discussed treatment will take time. Goal will be to reduce frequency/intensity/quantity of HA, not to be completely HA free. Gave copy of [facility] triggers for migraine informational sheet (N.b., a standard I give to patients who come to seek my care in HA clinic, regardless if they have migraines or not) and discussed clinic's non narcotic policy re: HA. Patient voiced understanding and agreement.            -will have patient track HA, discussed alan for smart phone           -will have patient work on lifestyle           -if HA change in quality/nature, will get updated imaging study            -discussed potential for teratogenicity with treatment, if her family planning status should change, discussed I'd like her to let office know immediately. She voice agreement    For HA Prevention:  1 offered topamax vs non sedating muscle relaxant vs cGRP. Chose topamax, discussed adv effects/dosing, titration instructions (goal is to 100 mg over next month). She agreed   2 Consider the cefaly device, www.cefaly.us,  "please research, this is TENs unit designed in [place] and was FDA approved in the early  for migraines.     3 Suggested that the patient research out OTC supplements (stressed NOT FDA regulated and should take at own risk).    To help prevent a headache:   Petadolex (butterbur root)  Vitamin B2 (riboflavin)  CoQ10  Magnesium  "Migraeeze" which is petadolex/Vitamin B2/yanelis  "Dolovent" which is magnesium/Vitamin B2/coq10    * all of the above can be found locally in a variety of shops or on the internet        For HA :  1 stop imitrex, stop nurtec, try maxalt, discussed adv effects/dosing, she agreed      To break up Headaches:  Course of deltasone to break up HA, discussed adv effects/dosing, take on full stomach/breakfast time only, take until completion, she agreed     Other:  N/a           All test results as well as any necessary instructions were reviewed and discussed with patient.    Review:  Orders Placed This Encounter    rizatriptan (MAXALT-MLT) 5 MG disintegrating tablet    topiramate (TOPAMAX) 25 MG tablet    predniSONE (DELTASONE) 20 MG tablet         Patient to return to PCP/other specialists for all other problems  Patient to continue on all medications as Rx'd   A detailed AVS was provided to the patient with patient readback   RTO- 2-3 months   The patient indicates understanding of these issues and agrees to the plan.    HPI:   Veronique Vick is a 32 y.o.right handed, female with a PMHx of:  migraines, IBS,  and exposure to HIV (historically negative testing, followed by ID), at the request of PCP for migraines.            HA HPI:  Start:HA for 10 years, around menses typically, usually  last 2 days then go away (with medicine). Has noticed that no medicine has touched most recent HA and they continue to persist (now going on two weeks).  History of trauma (no), History of CNS infection (no), History of Stroke (no)  Location:right temporal region (historically on LEFT " "side, though switched to opposite side after daithe piercing earlier this year)    Severity: range: 3-7/10  Duration:24-48 hours for migraines, hours for other HA  Frequency:10 days out of month with cervicogenic/tension HA, 2-3 days on average for migraines, however past month has had >2 weeks of symptoms (half of which with pain the other 1/2 with "brain fog" that occurs with HA)   Associated factors (bolded positive) WITH   migraine: Nausea, vomiting, photophobia, phonophobia, tinnitus, scalp pain, vision loss, diplopia, scintillations, eye pain, jaw pain, weakness?    Tried:imitrex, flexeril, OTC, nurtec  Aura: none   Triggers (in bold): menstrual cycle, stress, lack of sleep, too much caffeine, too little caffeine, weather change, seasonal change, strong odours, bright lights, sunlight, food       Currently having a HA?:yes  Positives in bold: Hx of Kidney Stones, asthma, GI bleed, osteoporosis, CAD/MI, CVA/TIA, DM  <---denies   Imaging on file: no   Therapies tried in past: (failures to be marked, if known---why did it fail?)  Phenergan  Imitrex-causes myalgia   OTC  Fioricet   Ubrelvy  Flexeril  Wellbutrin  Zoloft  Paroxetine  Toradol  Cymbalta  Flexeril  Buspirone  Bupropion   Nifedipine   Physical therapy       Medication Reconciliation:   Current Outpatient Medications   Medication Sig Dispense Refill    abacavir-dolutegravir-lamivud (TRIUMEQ) 600- mg Tab Take 1 tablet by mouth once daily. 90 tablet 1    buPROPion (WELLBUTRIN XL) 150 MG TB24 tablet Take 1 tablet (150 mg total) by mouth once daily. 30 tablet 11    cyclobenzaprine (FLEXERIL) 10 MG tablet Take 1 tablet (10 mg total) by mouth 3 (three) times daily as needed for Muscle spasms. 20 tablet 2    ergocalciferol (ERGOCALCIFEROL) 50,000 unit Cap Take 1 capsule (50,000 Units total) by mouth every 7 days. 12 capsule 3    promethazine (PHENERGAN) 12.5 MG Tab Take 12.5 mg by mouth every 4 (four) hours as needed.      ubrogepant (UBRELVY) 50 mg " tablet Take 1 tablet (50 mg total) by mouth as needed for Migraine. If symptoms persist or return, may repeat dose after 2 hours. Maximum: 200 mg per 24 hours 16 tablet 2     No current facility-administered medications for this visit.     Review of patient's allergies indicates:   Allergen Reactions    Eggs [egg derived] Nausea Only    Ondansetron Dermatitis    Sulfa (sulfonamide antibiotics) Nausea Only    Sulfamethoxazole-trimethoprim Diarrhea       PMHx:  Past Medical History:   Diagnosis Date    Closed fracture of distal end of left radius with routine healing 3/31/2016    Wrist fracture     left     Past Surgical History:   Procedure Laterality Date    colposcopy      multiple    ORIF RADIUS & ULNA FRACTURES Left 2016    WISDOM TOOTH EXTRACTION         Fhx:   Family History   Problem Relation Age of Onset    Migraines Mother     Hyperlipidemia Father     Hypertension Father     Migraines Sister        Shx: NP family med   Social History     Socioeconomic History    Marital status: Single   Tobacco Use    Smoking status: Never    Smokeless tobacco: Never   Substance and Sexual Activity    Alcohol use: Yes     Alcohol/week: 0.0 standard drinks of alcohol     Comment: social    Drug use: No    Sexual activity: Yes     Partners: Male     Birth control/protection: I.U.D.     Social Determinants of Health     Financial Resource Strain: Low Risk  (9/14/2023)    Overall Financial Resource Strain (CARDIA)     Difficulty of Paying Living Expenses: Not hard at all   Food Insecurity: No Food Insecurity (9/14/2023)    Hunger Vital Sign     Worried About Running Out of Food in the Last Year: Never true     Ran Out of Food in the Last Year: Never true   Transportation Needs: No Transportation Needs (9/14/2023)    PRAPARE - Transportation     Lack of Transportation (Medical): No     Lack of Transportation (Non-Medical): No   Physical Activity: Insufficiently Active (9/14/2023)    Exercise Vital Sign     Days of Exercise per  Week: 3 days     Minutes of Exercise per Session: 30 min   Stress: Stress Concern Present (9/14/2023)    [employer] [place] [employer] - Occupational Stress Questionnaire     Feeling of Stress : Very much   Social Connections: Unknown (9/14/2023)    Social Connection and Isolation Panel [NHANES]     Frequency of Communication with Friends and Family: More than three times a week     Frequency of Social Gatherings with Friends and Family: More than three times a week     Active Member of Clubs or Organizations: No     Attends Club or Organization Meetings: Patient refused     Marital Status: Never    Housing Stability: Low Risk  (9/14/2023)    Housing Stability Vital Sign     Unable to Pay for Housing in the Last Year: No     Number of Places Lived in the Last Year: 1     Unstable Housing in the Last Year: No           Labs:   Reviewed in chart     Imaging:   Reviewed in chart       Other testing:  Reviewed in chart     Note: I have independently reviewed any/all imaging/labs/tests and agree with the report (s) as documented.  Any discrepancies will be as noted/demarcated by free text.  JUAN JOSE SADLER 9/18/2023                     ROS:   Review Of Systems (questions asked, positive or additions in BOLD)  Gen: Weight change, fatigue/malaise, pyrexia   HEENT: Tinnitus, headache,  blurred vision, eye pain, diplopia, photophobia,  nose bleeds, congestion, sore throat, jaw pain, scalp pain, neck stiffness   Card: Palpitations, CP   Pulm: SOB, cough   Vas: Easy bruising, easy bleeding   GI: N/V/D/C, incontinence, hematemesis, hematochezia    : incontinence, hematuria   Endocrine: Temp intolerance, polyuria, polydipsia   M/S: Neck pain, myalgia, back pain, joint pain, falls    Neuro: PER HPI   PSY: Memory loss, confusion, depression, anxiety, trouble in sleep, hallucinations          EXAM:   /88 (BP Location: Right arm, Patient Position: Sitting, BP Method: Medium (Automatic))   Pulse 87   Temp 97.8 °F  "(36.6 °C)   Resp 17   Ht 5' 7" (1.702 m)   Wt 83.8 kg (184 lb 13.7 oz)   LMP 08/31/2023 (Exact Date)   BMI 28.95 kg/m²    GEN:  NAD  HEENT: NC/AT, Frontalis was NTTP, temporalis was NTTP,  nares patent, dentition appropriate,  neck supple, trachea midline, Occiput and trapezius  TTP     EXTREM:   no edema present.    NEURO:  Mental Status:  Awake, alert and appropriately oriented to time, place, and person.  Normal attention and concentration.  Speech is fluent and appropriate language function (I.e., comprehension, repetition, etc).     Cranial Nerves:      Pupils are equal and reactive to light.  Extraocular movements are intact and without nystagmus.  Visual fields are full to confrontation testing. Facial movement is symmetric.  Facial sensation is intact.  Hearing is normal. Uvula in midline. FROM of neck in all (6) directions, shoulder shrug symmetrical. Tongue in midline without fasiculation.     Motor:  RUE:appropriate against gravity and medium force as tested 5/5              LUE: appropriate against gravity and medium force as tested 5/5              RLE:appropriate against gravity and medium force as tested 5/5              LLE: appropriate against gravity and medium force as tested 5/5  Tremor/pronator drift  not apparent.     finger extension strength was strong bilat     Sensory:  RUE  intact light touch, proprioception, and temperature  LUE intact light touch, proprioception, and temperature    RLE intact light touch  LLE intact light touch      DTR's:                                            R              L  biceps 2+ 2+         brachioradialis 2+ 2+   Knee jerk 2+ 2+        Coordination:  FTN-WNL.      Gait and Stance: Normal manner of stance and gait function testing. Romberg was Negative.          This document has been electronically signed by Mr. Franklin Franco MPA, PA-C on 9/18/2023, I have personally typed this message using the EMR.       Dr Kishore MD was available during today's " visit.     Personal Protective Equipment:    Personal Protective Equipment was used during this encounter including:   non latex gloves.          CC: No, Primary Doctor

## 2023-09-28 DIAGNOSIS — B20 HIV INFECTION, UNSPECIFIED SYMPTOM STATUS: ICD-10-CM

## 2023-09-29 RX ORDER — ABACAVIR SULFATE, DOLUTEGRAVIR SODIUM, LAMIVUDINE 600; 50; 300 MG/1; MG/1; MG/1
1 TABLET, FILM COATED ORAL DAILY
Qty: 90 TABLET | Refills: 1 | Status: ACTIVE | OUTPATIENT
Start: 2023-09-29 | End: 2024-04-03

## 2023-10-02 ENCOUNTER — PATIENT MESSAGE (OUTPATIENT)
Dept: NEUROLOGY | Facility: CLINIC | Age: 33
End: 2023-10-02
Payer: COMMERCIAL

## 2023-10-02 RX ORDER — ATOGEPANT 60 MG/1
60 TABLET ORAL DAILY
Qty: 30 TABLET | Refills: 6 | Status: SHIPPED | OUTPATIENT
Start: 2023-10-02 | End: 2023-11-21 | Stop reason: SDUPTHER

## 2023-10-03 ENCOUNTER — TELEPHONE (OUTPATIENT)
Dept: NEUROLOGY | Facility: CLINIC | Age: 33
End: 2023-10-03
Payer: COMMERCIAL

## 2023-10-03 NOTE — TELEPHONE ENCOUNTER
QULIPTA 60 MG TABLETS  prescription has been APPROVED FROM 10/3/2023 TO 4/2/2024 with copayment of $0.       [facility] [place]

## 2023-11-21 ENCOUNTER — OFFICE VISIT (OUTPATIENT)
Dept: NEUROLOGY | Facility: CLINIC | Age: 33
End: 2023-11-21
Payer: COMMERCIAL

## 2023-11-21 VITALS
WEIGHT: 184.88 LBS | HEIGHT: 67 IN | RESPIRATION RATE: 17 BRPM | DIASTOLIC BLOOD PRESSURE: 77 MMHG | SYSTOLIC BLOOD PRESSURE: 128 MMHG | HEART RATE: 85 BPM | BODY MASS INDEX: 29.02 KG/M2 | TEMPERATURE: 98 F

## 2023-11-21 DIAGNOSIS — G43.909 EPISODIC MIGRAINE: ICD-10-CM

## 2023-11-21 DIAGNOSIS — G43.829 MENSTRUAL MIGRAINE WITHOUT STATUS MIGRAINOSUS, NOT INTRACTABLE: Primary | ICD-10-CM

## 2023-11-21 PROCEDURE — 1159F PR MEDICATION LIST DOCUMENTED IN MEDICAL RECORD: ICD-10-PCS | Mod: CPTII,S$GLB,, | Performed by: PHYSICIAN ASSISTANT

## 2023-11-21 PROCEDURE — 3078F PR MOST RECENT DIASTOLIC BLOOD PRESSURE < 80 MM HG: ICD-10-PCS | Mod: CPTII,S$GLB,, | Performed by: PHYSICIAN ASSISTANT

## 2023-11-21 PROCEDURE — 3078F DIAST BP <80 MM HG: CPT | Mod: CPTII,S$GLB,, | Performed by: PHYSICIAN ASSISTANT

## 2023-11-21 PROCEDURE — 99999 PR PBB SHADOW E&M-EST. PATIENT-LVL IV: CPT | Mod: PBBFAC,,, | Performed by: PHYSICIAN ASSISTANT

## 2023-11-21 PROCEDURE — 3044F HG A1C LEVEL LT 7.0%: CPT | Mod: CPTII,S$GLB,, | Performed by: PHYSICIAN ASSISTANT

## 2023-11-21 PROCEDURE — 1160F RVW MEDS BY RX/DR IN RCRD: CPT | Mod: CPTII,S$GLB,, | Performed by: PHYSICIAN ASSISTANT

## 2023-11-21 PROCEDURE — 3074F SYST BP LT 130 MM HG: CPT | Mod: CPTII,S$GLB,, | Performed by: PHYSICIAN ASSISTANT

## 2023-11-21 PROCEDURE — 3074F PR MOST RECENT SYSTOLIC BLOOD PRESSURE < 130 MM HG: ICD-10-PCS | Mod: CPTII,S$GLB,, | Performed by: PHYSICIAN ASSISTANT

## 2023-11-21 PROCEDURE — 3044F PR MOST RECENT HEMOGLOBIN A1C LEVEL <7.0%: ICD-10-PCS | Mod: CPTII,S$GLB,, | Performed by: PHYSICIAN ASSISTANT

## 2023-11-21 PROCEDURE — 99213 PR OFFICE/OUTPT VISIT, EST, LEVL III, 20-29 MIN: ICD-10-PCS | Mod: S$GLB,,, | Performed by: PHYSICIAN ASSISTANT

## 2023-11-21 PROCEDURE — 1160F PR REVIEW ALL MEDS BY PRESCRIBER/CLIN PHARMACIST DOCUMENTED: ICD-10-PCS | Mod: CPTII,S$GLB,, | Performed by: PHYSICIAN ASSISTANT

## 2023-11-21 PROCEDURE — 3008F BODY MASS INDEX DOCD: CPT | Mod: CPTII,S$GLB,, | Performed by: PHYSICIAN ASSISTANT

## 2023-11-21 PROCEDURE — 1159F MED LIST DOCD IN RCRD: CPT | Mod: CPTII,S$GLB,, | Performed by: PHYSICIAN ASSISTANT

## 2023-11-21 PROCEDURE — 99999 PR PBB SHADOW E&M-EST. PATIENT-LVL IV: ICD-10-PCS | Mod: PBBFAC,,, | Performed by: PHYSICIAN ASSISTANT

## 2023-11-21 PROCEDURE — 3008F PR BODY MASS INDEX (BMI) DOCUMENTED: ICD-10-PCS | Mod: CPTII,S$GLB,, | Performed by: PHYSICIAN ASSISTANT

## 2023-11-21 PROCEDURE — 99213 OFFICE O/P EST LOW 20 MIN: CPT | Mod: S$GLB,,, | Performed by: PHYSICIAN ASSISTANT

## 2023-11-21 RX ORDER — PREDNISONE 20 MG/1
TABLET ORAL
Qty: 12 TABLET | Refills: 0 | Status: SHIPPED | OUTPATIENT
Start: 2023-11-21 | End: 2024-02-08

## 2023-11-21 RX ORDER — ATOGEPANT 60 MG/1
60 TABLET ORAL DAILY
Qty: 90 TABLET | Refills: 1 | Status: SHIPPED | OUTPATIENT
Start: 2023-11-21

## 2023-11-21 NOTE — PROGRESS NOTES
[facility]  [address] [place] [address]  Phone:[phone]  Fax: [phone]  Follow up visit     Patient Name: Veronique Vick  : 1990  MRN:  [id number]  Today: 2023   LOV: 2023  chief complaint: No chief complaint on file.    PCP: No, Primary Doctor.       Assessment:   Veronique Vick is a 33 y.o. right handed female with a PMHx of:  migraines, IBS,  and exposure to HIV (historically negative testing, followed by ID) in follow up for migraines. Appears she has menstrual migraine with some tension HA/myofascial pain. Better on current regimen.       Review:    ICD-10-CM ICD-9-CM   1. Menstrual migraine without status migrainosus, not intractable  G43.829 346.40   2. Episodic migraine  G43.909 346.90           Plan:               -if HA change in quality/nature, will get updated imaging study         For HA Prevention:  1 Continue qulipta 1 pill daily, 90 day supply with a refill sent to the pharmacy        For HA :  Has maxalt       To break up Headaches:  Gave refill of Course of deltasone to break up HA, she is aware how to break up HA if she should have another bad series     Other:  N/a           All test results as well as any necessary instructions were reviewed and discussed with patient.    Review:  Orders Placed This Encounter    atogepant (QULIPTA) 60 mg Tab    predniSONE (DELTASONE) 20 MG tablet           Patient to return to PCP/other specialists for all other problems  Patient to continue on all medications as Rx'd  Full office note available online   RTO- 6 months   The patient indicates understanding of these issues and agrees to the plan.    HPI:   Veronique Vick is a 33 y.o.right handed, female with a PMHx of:  migraines, IBS,  and exposure to HIV (historically negative testing, followed by ID),whom presents solo in follow up for migraines.     At LOV:  started topamax (later found to not  "tolerate and then started on qulipta), and wrote for maxalt. Course of steroids written to break up HA.   Qulipta working well   1 migraine since last seen   OTC was abortive  Pain along frontalis  Last HA a week ago   Maxalt is also helpful  Steroids helpful   Hasn't had to use maxalt in >1 month         HA historically:  Start:HA for 10 years, around menses typically, usually  last 2 days then go away (with medicine). Has noticed that no medicine has touched most recent HA and they continue to persist (now going on two weeks).  History of trauma (no), History of CNS infection (no), History of Stroke (no)  Location:right temporal region (historically on LEFT side, though switched to opposite side after daithe piercing earlier this year)    Severity: range: 3-7/10  Duration:24-48 hours for migraines, hours for other HA  Frequency:10 days out of month with cervicogenic/tension HA, 2-3 days on average for migraines, however past month has had >2 weeks of symptoms (half of which with pain the other 1/2 with "brain fog" that occurs with HA)   Associated factors (bolded positive) WITH   migraine: Nausea, vomiting, photophobia, phonophobia, tinnitus, scalp pain, vision loss, diplopia, scintillations, eye pain, jaw pain, weakness?    Tried:imitrex, flexeril, OTC, nurtec  Aura: none   Triggers (in bold): menstrual cycle, stress, lack of sleep, too much caffeine, too little caffeine, weather change, seasonal change, strong odours, bright lights, sunlight, food       Currently having a HA?:yes  Positives in bold: Hx of Kidney Stones, asthma, GI bleed, osteoporosis, CAD/MI, CVA/TIA, DM  <---denies   Imaging on file: no   Therapies tried in past: (failures to be marked, if known---why did it fail?)  Phenergan  Imitrex-causes myalgia   OTC  Fioricet   Ubrelvy  Flexeril  Wellbutrin  Zoloft  Paroxetine  Toradol  Cymbalta  Flexeril  Buspirone  Bupropion   Nifedipine   Physical therapy   Topamax-didn't tolerate "   Maxalt  qulipta  Steroids         Medication Reconciliation:   Current Outpatient Medications   Medication Sig Dispense Refill    abacavir-dolutegravir-lamivud (TRIUMEQ) 600- mg Tab Take 1 tablet by mouth once daily. 90 tablet 1    atogepant (QULIPTA) 60 mg Tab Take 1 tablet (60 mg) by mouth once daily. 30 tablet 6    buPROPion (WELLBUTRIN XL) 150 MG TB24 tablet Take 1 tablet (150 mg total) by mouth once daily. 30 tablet 11    cyclobenzaprine (FLEXERIL) 10 MG tablet Take 1 tablet (10 mg total) by mouth 3 (three) times daily as needed for Muscle spasms. 20 tablet 2    ergocalciferol (ERGOCALCIFEROL) 50,000 unit Cap Take 1 capsule (50,000 Units total) by mouth every 7 days. 12 capsule 3    predniSONE (DELTASONE) 20 MG tablet Take 3 tablets by mouth for two days then 2 tablets for two days then 1 tablet for two days; TAKE ON A FULL STOMACH WITH BREAKFAST.  On full stomach (breakfast): 3 tabs for 2 days, 2 tabs for 2 days, 1 tab for 2 days. Finish 12 tablet 0    promethazine (PHENERGAN) 12.5 MG Tab Take 12.5 mg by mouth every 4 (four) hours as needed.      rizatriptan (MAXALT-MLT) 5 MG disintegrating tablet Dissolve 1 tablet in mouth at onset of headache; dissolve another tablet in mouth two hours later if needed; DO NOT USE NO MORE THAN 2 TABLETS PER DAY OR 3 DAYS PER WEEK. 10 tablet 5    sumatriptan succinate (IMITREX ORAL) Take by mouth.      ubrogepant (UBRELVY) 50 mg tablet Take 1 tablet (50 mg total) by mouth as needed for Migraine. If symptoms persist or return, may repeat dose after 2 hours. Maximum: 200 mg per 24 hours (Patient not taking: Reported on 9/18/2023) 16 tablet 2     No current facility-administered medications for this visit.     Review of patient's allergies indicates:   Allergen Reactions    Eggs [egg derived] Nausea Only    Ondansetron Dermatitis    Sulfa (sulfonamide antibiotics) Nausea Only    Sulfamethoxazole-trimethoprim Diarrhea       PMHx:  Past Medical History:   Diagnosis Date     Closed fracture of distal end of left radius with routine healing 3/31/2016    Wrist fracture     left     Past Surgical History:   Procedure Laterality Date    colposcopy      multiple    ORIF RADIUS & ULNA FRACTURES Left 2016    WISDOM TOOTH EXTRACTION         Fhx:   Family History   Problem Relation Age of Onset    Migraines Mother     Hyperlipidemia Father     Hypertension Father     Migraines Sister        Shx: NP family med   Social History     Socioeconomic History    Marital status: Single   Tobacco Use    Smoking status: Never    Smokeless tobacco: Never   Substance and Sexual Activity    Alcohol use: Yes     Alcohol/week: 0.0 standard drinks of alcohol     Comment: social    Drug use: No    Sexual activity: Yes     Partners: Male     Birth control/protection: I.U.D.     Social Determinants of Health     Financial Resource Strain: Low Risk  (9/14/2023)    Overall Financial Resource Strain (CARDIA)     Difficulty of Paying Living Expenses: Not hard at all   Food Insecurity: No Food Insecurity (9/14/2023)    Hunger Vital Sign     Worried About Running Out of Food in the Last Year: Never true     Ran Out of Food in the Last Year: Never true   Transportation Needs: No Transportation Needs (9/14/2023)    PRAPARE - Transportation     Lack of Transportation (Medical): No     Lack of Transportation (Non-Medical): No   Physical Activity: Insufficiently Active (9/14/2023)    Exercise Vital Sign     Days of Exercise per Week: 3 days     Minutes of Exercise per Session: 30 min   Stress: Stress Concern Present (9/14/2023)    [employer] - Occupational Stress Questionnaire     Feeling of Stress : Very much   Social Connections: Unknown (9/14/2023)    Social Connection and Isolation Panel [NHANES]     Frequency of Communication with Friends and Family: More than three times a week     Frequency of Social Gatherings with Friends and Family: More than three times a week     Active Member of  "Clubs or Organizations: No     Attends Club or Organization Meetings: Patient refused     Marital Status: Never    Housing Stability: Low Risk  (9/14/2023)    Housing Stability Vital Sign     Unable to Pay for Housing in the Last Year: No     Number of Places Lived in the Last Year: 1     Unstable Housing in the Last Year: No           Labs:   Reviewed in chart     Imaging:   Reviewed in chart       Other testing:  Reviewed in chart     Note: I have independently reviewed any/all imaging/labs/tests and agree with the report (s) as documented.  Any discrepancies will be as noted/demarcated by free text.  JUAN JOSE SADLER 11/21/2023                     ROS:   Review Of Systems (questions asked, positive or additions in BOLD)  Gen: Weight change, fatigue/malaise, pyrexia   HEENT: Tinnitus, headache,  blurred vision, eye pain, diplopia, photophobia,  nose bleeds, congestion, sore throat, jaw pain, scalp pain, neck stiffness   Card: Palpitations, CP   Pulm: SOB, cough   Vas: Easy bruising, easy bleeding   GI: N/V/D/C, incontinence, hematemesis, hematochezia    : incontinence, hematuria   Endocrine: Temp intolerance, polyuria, polydipsia   M/S: Neck pain, myalgia, back pain, joint pain, falls    Neuro: PER HPI   PSY: Memory loss, confusion, depression, anxiety, trouble in sleep, hallucinations          EXAM:   /77 (BP Location: Left arm, Patient Position: Sitting, BP Method: Medium (Automatic))   Pulse 85   Temp 97.8 °F (36.6 °C)   Resp 17   Ht 5' 7" (1.702 m)   Wt 83.8 kg (184 lb 13.7 oz)   LMP 10/24/2023 (Exact Date)   BMI 28.95 kg/m²    GEN:  NAD  HEENT: NC/AT   EXTREM:   no edema present.    NEURO:  Mental Status:  Awake, alert and appropriately oriented to time, place, and person.  Normal attention and concentration.  Speech is fluent and appropriate language function (I.e., comprehension, repetition, etc).     Cranial Nerves:       Extraocular movements are intact and without nystagmus.  Visual fields " are full to confrontation testing. Facial movement is symmetric.   Hearing is normal. Uvula in midline. FROM of neck in all (6) directions, shoulder shrug symmetrical. Tongue in midline without fasiculation.     Motor:  antigravity in all limbs   Tremor/pronator drift  not apparent.      DTR  Knee jerk 2+ 2+        Coordination:  FTN-WNL.      Gait and Stance: Normal manner of stance and gait function testing          This document has been electronically signed by Mr. Franklin Franco MPA, PA-C on 11/21/2023, I have personally typed this message using the EMR.       Dr Kishore MD was available during today's visit.     Personal Protective Equipment:    Personal Protective Equipment was used during this encounter including:   non latex gloves.          CC: No, Primary Doctor

## 2023-12-18 ENCOUNTER — OFFICE VISIT (OUTPATIENT)
Dept: INFECTIOUS DISEASES | Facility: CLINIC | Age: 33
End: 2023-12-18
Payer: COMMERCIAL

## 2023-12-18 ENCOUNTER — LAB VISIT (OUTPATIENT)
Dept: LAB | Facility: HOSPITAL | Age: 33
End: 2023-12-18
Attending: INTERNAL MEDICINE
Payer: COMMERCIAL

## 2023-12-18 VITALS
BODY MASS INDEX: 28.52 KG/M2 | HEART RATE: 85 BPM | TEMPERATURE: 98 F | HEIGHT: 67 IN | DIASTOLIC BLOOD PRESSURE: 60 MMHG | SYSTOLIC BLOOD PRESSURE: 124 MMHG | WEIGHT: 181.69 LBS

## 2023-12-18 DIAGNOSIS — B20 HIV INFECTION, UNSPECIFIED SYMPTOM STATUS: Primary | ICD-10-CM

## 2023-12-18 DIAGNOSIS — R87.611 PAP SMEAR OF CERVIX WITH ASCUS, CANNOT EXCLUDE HGSIL: ICD-10-CM

## 2023-12-18 DIAGNOSIS — B20 HIV INFECTION, UNSPECIFIED SYMPTOM STATUS: ICD-10-CM

## 2023-12-18 DIAGNOSIS — E55.9 VITAMIN D DEFICIENCY: ICD-10-CM

## 2023-12-18 LAB
25(OH)D3+25(OH)D2 SERPL-MCNC: 37 NG/ML (ref 30–96)
ALBUMIN SERPL BCP-MCNC: 3.9 G/DL (ref 3.5–5.2)
ALP SERPL-CCNC: 49 U/L (ref 55–135)
ALT SERPL W/O P-5'-P-CCNC: 12 U/L (ref 10–44)
ANION GAP SERPL CALC-SCNC: 9 MMOL/L (ref 8–16)
AST SERPL-CCNC: 15 U/L (ref 10–40)
BASOPHILS # BLD AUTO: 0.03 K/UL (ref 0–0.2)
BASOPHILS NFR BLD: 0.6 % (ref 0–1.9)
BILIRUB SERPL-MCNC: 0.7 MG/DL (ref 0.1–1)
BUN SERPL-MCNC: 8 MG/DL (ref 6–20)
CALCIUM SERPL-MCNC: 8.5 MG/DL (ref 8.7–10.5)
CHLORIDE SERPL-SCNC: 108 MMOL/L (ref 95–110)
CO2 SERPL-SCNC: 21 MMOL/L (ref 23–29)
CREAT SERPL-MCNC: 0.8 MG/DL (ref 0.5–1.4)
DIFFERENTIAL METHOD: ABNORMAL
EOSINOPHIL # BLD AUTO: 0.1 K/UL (ref 0–0.5)
EOSINOPHIL NFR BLD: 2.6 % (ref 0–8)
ERYTHROCYTE [DISTWIDTH] IN BLOOD BY AUTOMATED COUNT: 12.2 % (ref 11.5–14.5)
EST. GFR  (NO RACE VARIABLE): >60 ML/MIN/1.73 M^2
GLUCOSE SERPL-MCNC: 79 MG/DL (ref 70–110)
HCT VFR BLD AUTO: 38.3 % (ref 37–48.5)
HGB BLD-MCNC: 13.2 G/DL (ref 12–16)
IMM GRANULOCYTES # BLD AUTO: 0.01 K/UL (ref 0–0.04)
IMM GRANULOCYTES NFR BLD AUTO: 0.2 % (ref 0–0.5)
LYMPHOCYTES # BLD AUTO: 1.9 K/UL (ref 1–4.8)
LYMPHOCYTES NFR BLD: 35.3 % (ref 18–48)
MCH RBC QN AUTO: 32.3 PG (ref 27–31)
MCHC RBC AUTO-ENTMCNC: 34.5 G/DL (ref 32–36)
MCV RBC AUTO: 94 FL (ref 82–98)
MONOCYTES # BLD AUTO: 0.4 K/UL (ref 0.3–1)
MONOCYTES NFR BLD: 7 % (ref 4–15)
NEUTROPHILS # BLD AUTO: 3 K/UL (ref 1.8–7.7)
NEUTROPHILS NFR BLD: 54.3 % (ref 38–73)
NRBC BLD-RTO: 0 /100 WBC
PLATELET # BLD AUTO: 176 K/UL (ref 150–450)
PMV BLD AUTO: 12.1 FL (ref 9.2–12.9)
POTASSIUM SERPL-SCNC: 3.6 MMOL/L (ref 3.5–5.1)
PROT SERPL-MCNC: 6.6 G/DL (ref 6–8.4)
RBC # BLD AUTO: 4.09 M/UL (ref 4–5.4)
SODIUM SERPL-SCNC: 138 MMOL/L (ref 136–145)
WBC # BLD AUTO: 5.44 K/UL (ref 3.9–12.7)

## 2023-12-18 PROCEDURE — 3044F PR MOST RECENT HEMOGLOBIN A1C LEVEL <7.0%: ICD-10-PCS | Mod: CPTII,S$GLB,, | Performed by: INTERNAL MEDICINE

## 2023-12-18 PROCEDURE — 1160F RVW MEDS BY RX/DR IN RCRD: CPT | Mod: CPTII,S$GLB,, | Performed by: INTERNAL MEDICINE

## 2023-12-18 PROCEDURE — 3008F PR BODY MASS INDEX (BMI) DOCUMENTED: ICD-10-PCS | Mod: CPTII,S$GLB,, | Performed by: INTERNAL MEDICINE

## 2023-12-18 PROCEDURE — 80053 COMPREHEN METABOLIC PANEL: CPT | Performed by: INTERNAL MEDICINE

## 2023-12-18 PROCEDURE — 36415 COLL VENOUS BLD VENIPUNCTURE: CPT | Mod: PO | Performed by: INTERNAL MEDICINE

## 2023-12-18 PROCEDURE — 87536 HIV-1 QUANT&REVRSE TRNSCRPJ: CPT | Performed by: INTERNAL MEDICINE

## 2023-12-18 PROCEDURE — 99214 PR OFFICE/OUTPT VISIT, EST, LEVL IV, 30-39 MIN: ICD-10-PCS | Mod: S$GLB,,, | Performed by: INTERNAL MEDICINE

## 2023-12-18 PROCEDURE — 3078F DIAST BP <80 MM HG: CPT | Mod: CPTII,S$GLB,, | Performed by: INTERNAL MEDICINE

## 2023-12-18 PROCEDURE — 3008F BODY MASS INDEX DOCD: CPT | Mod: CPTII,S$GLB,, | Performed by: INTERNAL MEDICINE

## 2023-12-18 PROCEDURE — 82306 VITAMIN D 25 HYDROXY: CPT | Performed by: INTERNAL MEDICINE

## 2023-12-18 PROCEDURE — 3074F SYST BP LT 130 MM HG: CPT | Mod: CPTII,S$GLB,, | Performed by: INTERNAL MEDICINE

## 2023-12-18 PROCEDURE — 99214 OFFICE O/P EST MOD 30 MIN: CPT | Mod: S$GLB,,, | Performed by: INTERNAL MEDICINE

## 2023-12-18 PROCEDURE — 86480 TB TEST CELL IMMUN MEASURE: CPT | Performed by: INTERNAL MEDICINE

## 2023-12-18 PROCEDURE — 1159F MED LIST DOCD IN RCRD: CPT | Mod: CPTII,S$GLB,, | Performed by: INTERNAL MEDICINE

## 2023-12-18 PROCEDURE — 3074F PR MOST RECENT SYSTOLIC BLOOD PRESSURE < 130 MM HG: ICD-10-PCS | Mod: CPTII,S$GLB,, | Performed by: INTERNAL MEDICINE

## 2023-12-18 PROCEDURE — 3078F PR MOST RECENT DIASTOLIC BLOOD PRESSURE < 80 MM HG: ICD-10-PCS | Mod: CPTII,S$GLB,, | Performed by: INTERNAL MEDICINE

## 2023-12-18 PROCEDURE — 3044F HG A1C LEVEL LT 7.0%: CPT | Mod: CPTII,S$GLB,, | Performed by: INTERNAL MEDICINE

## 2023-12-18 PROCEDURE — 1159F PR MEDICATION LIST DOCUMENTED IN MEDICAL RECORD: ICD-10-PCS | Mod: CPTII,S$GLB,, | Performed by: INTERNAL MEDICINE

## 2023-12-18 PROCEDURE — 85025 COMPLETE CBC W/AUTO DIFF WBC: CPT | Performed by: INTERNAL MEDICINE

## 2023-12-18 PROCEDURE — 1160F PR REVIEW ALL MEDS BY PRESCRIBER/CLIN PHARMACIST DOCUMENTED: ICD-10-PCS | Mod: CPTII,S$GLB,, | Performed by: INTERNAL MEDICINE

## 2023-12-18 NOTE — PATIENT INSTRUCTIONS
Continue triumeq  Prevnar 20 vaccine in 2-3 years  Meningococcal vaccine 2024-25    Your next visit will be in 6 months with Dr. Palmira Diehl    It has been my pleasure and my honor to be your physician

## 2023-12-18 NOTE — PROGRESS NOTES
Subjective:       Patient ID: Veronique Vick is a 33 y.o. female.    Chief Complaint::  B20    Her Source of exposure learned that he was positive last November and was on treatment but did not reveal his diagnosis until after their last encounter in April 2020.   Her last test was probably during pregnancy for her 2 year old who exhibits no signs of infection .   Recent serologies were reviewed.   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 The Genosure prime was not completed and will be redrawn. We reviewed the [facility] guidelines for treatment in pregnant women and have come to a tentative agreement to use Triumeq. She learned that the source of her infection was on Biktarvy at the time.    12/7/20: tolerating triumeq, as long as she takes it with food. I have not received records from prior GYN, but can see a fair amount in care everywhere. She has 4 month pap repeated this week. She took her flu vaccine. Having anxiety, regarding her ex , requests something for anxiety, non habit forming. She agrees to Buspar.     3/8/21: tolerating triumeq, its a large tablet. Has a significant other, thinking about getting . Yevgeniy. Thinking about having children.  She is interested in him receiving prep.  She is taking herself in her partner.  She understands that she will see a maternal/fetal medicine. Her OB is Dr. Fields and she would deliver [facility]    7/12/21: interim reviewed. Reviewed lab 6/2021 lab.  Doing extremely well.  Having a good weight loss, dieting, exercising    1/13/22: interim reviewed. PAP still abnormal but HPV negative. Reviewed dec lab.  Had zoster and  brief neuralgia left thoracic, under a very stressful circumstance. Resolved with valtrex.  7/20/22: doing well. Had a mild UTI 2 weeks ago. Had some LBP which is resolved.    1/18/23: well in general. For about a month, has been having a cough and wheezing. No history of asthma. Her sister has a new dog and she suspects that this may be the source of allergen. She is taking benadryl at night with some improvement. She gave herself 10d of augmentin without benefit. Has not used a nasal steroid. She has budesonide nasal spray at home.      CXR sinus xray and cbc are fine except 15% eosinophils. No rashes and no diarrhea  Interested in artificial insemination now. Having abnormal PAPs associated with prior HPV. No recent signs of HPV just low grade dysplasia. She sees Dr. Fields.     7/20/23: doing well medically. Stress with family issues. Has tried artificial insemination from donor, and IUI , both unsuccessful, seen by MFM, and plan in place if she becomes pregnant.   Taking one a day prenatal, 5 mg folic acid, B12, and she takes vitamin D once a week and all separate from Triumeq. Reviewed labs, all good, no eosinophils, strongy Ab negative.     12/18/23: last visit. Had a flu shot(egg free). NOw has Full custody of her son. Age 5, and the stress is much improved. She is doing well at work, feels well. Migraines much improved on oral med. Has not had to take prednisone for migraines. Adherent to therapy. Had labs drawn today.     Current Outpatient Medications:     abacavir-dolutegravir-lamivud (TRIUMEQ) 600- mg Tab, Take 1 tablet by mouth once daily., Disp: 90 tablet, Rfl: 1    atogepant (QULIPTA) 60 mg Tab, Take 1 tablet (60 mg) by mouth once daily., Disp: 90 tablet, Rfl: 1    buPROPion (WELLBUTRIN XL) 150 MG TB24 tablet, Take 1 tablet (150 mg total) by mouth once daily., Disp: 30 tablet, Rfl: 11    ergocalciferol (ERGOCALCIFEROL) 50,000 unit Cap, Take 1 capsule (50,000 Units total) by mouth every 7 days.,  Disp: 12 capsule, Rfl: 3    cyclobenzaprine (FLEXERIL) 10 MG tablet, Take 1 tablet (10 mg total) by mouth 3 (three) times daily as needed for Muscle spasms. (Patient not taking: Reported on 12/18/2023), Disp: 20 tablet, Rfl: 2    predniSONE (DELTASONE) 20 MG tablet, Take 3 tablets by mouth for two days then 2 tablets for two days then 1 tablet for two days; TAKE ON A FULL STOMACH WITH BREAKFAST. On full stomach (breakfast): 3 tabs for 2 days, 2 tabs for 2 days, 1 tab for 2 days. Finish (Patient not taking: Reported on 12/18/2023), Disp: 12 tablet, Rfl: 0    promethazine (PHENERGAN) 12.5 MG Tab, Take 12.5 mg by mouth every 4 (four) hours as needed., Disp: , Rfl:     rizatriptan (MAXALT-MLT) 5 MG disintegrating tablet, Dissolve 1 tablet in mouth at onset of headache; dissolve another tablet in mouth two hours later if needed; DO NOT USE NO MORE THAN 2 TABLETS PER DAY OR 3 DAYS PER WEEK. (Patient not taking: Reported on 12/18/2023), Disp: 10 tablet, Rfl: 5  Review of patient's allergies indicates:   Allergen Reactions    Eggs [egg derived] Nausea Only     Cannot take egg derived flu vaccine    Ondansetron Dermatitis    Sulfa (sulfonamide antibiotics) Nausea Only    Sulfamethoxazole-trimethoprim Diarrhea     Past Medical History:   Diagnosis Date    Closed fracture of distal end of left radius with routine healing 3/31/2016    Wrist fracture     left   abnormal PAP    Multiple colposcopies, most recent in may 2023  Past Surgical History:   Procedure Laterality Date    colposcopy      multiple    ORIF RADIUS & ULNA FRACTURES Left 2016    WISDOM TOOTH EXTRACTION       Social History     Socioeconomic History    Marital status: Single   Tobacco Use    Smoking status: Never    Smokeless tobacco: Never   Substance and Sexual Activity    Alcohol use: Yes     Alcohol/week: 0.0 standard drinks of alcohol     Comment: social    Drug use: No    Sexual activity: Yes     Partners: Male     Birth control/protection: I.U.D.      Social Determinants of Health     Financial Resource Strain: Low Risk  (11/21/2023)    Overall Financial Resource Strain (CARDIA)     Difficulty of Paying Living Expenses: Not hard at all   Food Insecurity: No Food Insecurity (11/21/2023)    Hunger Vital Sign     Worried About Running Out of Food in the Last Year: Never true     Ran Out of Food in the Last Year: Never true   Transportation Needs: No Transportation Needs (11/21/2023)    PRAPARE - Transportation     Lack of Transportation (Medical): No     Lack of Transportation (Non-Medical): No   Physical Activity: Insufficiently Active (11/21/2023)    Exercise Vital Sign     Days of Exercise per Week: 3 days     Minutes of Exercise per Session: 30 min   Stress: Stress Concern Present (11/21/2023)    [employer] - Occupational Stress Questionnaire     Feeling of Stress : Rather much   Social Connections: Unknown (11/21/2023)    Social Connection and Isolation Panel [NHANES]     Frequency of Communication with Friends and Family: More than three times a week     Frequency of Social Gatherings with Friends and Family: More than three times a week     Active Member of Clubs or Organizations: No     Attends Club or Organization Meetings: Patient declined     Marital Status: Never    Housing Stability: Low Risk  (11/21/2023)    Housing Stability Vital Sign     Unable to Pay for Housing in the Last Year: No     Number of Places Lived in the Last Year: 1     Unstable Housing in the Last Year: No     Family History   Problem Relation Age of Onset    Migraines Mother     Hyperlipidemia Father     Hypertension Father     Migraines Sister        Travel History: [place], [place], [place](cruise)  Vaccine History: Hep B series (6th grade), Tdap 2/2018, yearly flu vaccine(egg allergy), zostavax 2013, COVID x 3 2021, menactra #1 2022,   Advanced Directive:   Safer Sex:  abstinent currently  Gyn: (Dr. Fields) UTD, repeat PAP 4/2022, 10/22  "abnormal but improved, 5/2023 with CIN1, HPV negative now, still ASCUS on last pap  Mammogram:  Bone Density:   Colonoscopy:    Review of Systems      Constitutional: No fever, chills, sweats, fatigue, weakness, weight loss.      Eyes: No change in vision, loss of vision,  . UTD eye exam    ENT: No sinus drainage, sore throat, mouth pain, or lesions. UTD dental exam,   Father is a dentist    Cardiovascular: No chest pain, CANONN, palpitations or pedal edema    Respiratory: No shortness of breath, CANNON, cough,       Gastrointestinal: No abdominal pain, nausea, vomiting, diarrhea,      Genitourinary: No dysuria, hematuria, , or urethritis    Musculoskeletal: No new pain, joint swelling, or injuries    Integumentary: No new rashes, lesions, or wounds    Neurological: No dizziness, vertigo, unusual headaches, neuropathy, or falls    Psychiatric:  No anxiety, depression, no memory loss,   or substance abuse      Endocrine: not diabetic.  Thyroid normal    Lymphatic: No lymphadenopathy, blood loss, anemia, or malignancy    Objective:      Blood pressure 124/60, pulse 85, temperature 97.7 °F (36.5 °C), height 5' 7" (1.702 m), weight 82.4 kg (181 lb 11.2 oz), last menstrual period 10/24/2023, SpO2 (P) 99 %. Body mass index is 28.46 kg/m².  Physical Exam    126/96    General: Alert and attentive, cooperative and in no distress    Eyes: Pupils equal, round, reactive to light, anicteric, EOMI    Neck: Supple, non-tender, no thyromegaly or masses    ENT: EAC patent, TM normal, nares patent, no oral lesions, teeth in good condition, no thrush    Cardiovascular: Regular rate and rhythm, no murmurs, rubs, or gallop    Respiratory: Lungs clear without wheezes, no rales, rub or rhonci    Gastrointestinal:  Soft, nontender, no masses, no guarding, bowel sounds normal    Genitourinary:  No flank tenderness    Vascular: No peripheral edema, phlebitis, pulses normal. Warm and well perfused    Musculoskeletal: Ambulates without difficulty, " no acute arthritis, synovitis or myositis. Normal muscle bulk and strength    Integumentary: Skin without rashes, lesions, or wounds    AnusRectum:      Neurological: Normal LOC, cranial nerves, speech, reflexes, normal gait    Psychiatric: Normal mood, speech, demeanor    Lymphatic: No cervical, supraclavicular, axillary  or inguinal lymphadenopathy      Wound:     HIV Table:   Toxo IgG  8/17/20 positive  [id number]  8/17/20 negative  RPR   6/9/23  Non reactive  Hepatitis A IgG 8/12/20 negative  Hepatitis B sAg 8/12/20 negative  Hepatitis B sAb 8/12/20 positive  Hepatitis B cAb 8/12/20 negative  Hepatitis C Ab  7/21/22 negative  Chl/GC  Vitamin D  12/18/23  37, lower despite replacement  TB gold  12/18/23 negative  Genotype  9/23/20  genotypr plus and integrase all neg  CD4 initial  8/17/20 Over 1000  Initial RNA  8/17/20 1500      Recent Diagnostics: reviewed all of the lab results       Assessment and Plan:           HIV infection, unspecified symptom status    Vitamin D deficiency    Pap smear of cervix with ASCUS, cannot exclude HGSIL      Continue triumeq  Prevnar 20 vaccine in 2-3 years  Meningococcal vaccine 2024-25    Your next visit will be in 6 months with Dr. Palmira Diehl    It has been my pleasure and my honor to be your physician     This note was created using Dragon voice recognition software that occasionally misinterpreted phrases or words.

## 2023-12-19 LAB
GAMMA INTERFERON BACKGROUND BLD IA-ACNC: 0.2 IU/ML
M TB IFN-G CD4+ BCKGRND COR BLD-ACNC: 0.01 IU/ML
M TB IFN-G CD4+ BCKGRND COR BLD-ACNC: 0.05 IU/ML
MITOGEN IGNF BCKGRD COR BLD-ACNC: 9.8 IU/ML
TB GOLD PLUS: NEGATIVE

## 2023-12-20 LAB
HIV1 RNA # SERPL NAA+PROBE: NOT DETECTED COPIES/ML
HIV1 RNA SERPL QL NAA+PROBE: NOT DETECTED

## 2024-01-02 ENCOUNTER — OFFICE VISIT (OUTPATIENT)
Dept: OBSTETRICS AND GYNECOLOGY | Facility: CLINIC | Age: 34
End: 2024-01-02
Payer: COMMERCIAL

## 2024-01-02 VITALS
WEIGHT: 181 LBS | HEIGHT: 67 IN | BODY MASS INDEX: 28.41 KG/M2 | SYSTOLIC BLOOD PRESSURE: 118 MMHG | DIASTOLIC BLOOD PRESSURE: 66 MMHG

## 2024-01-02 DIAGNOSIS — R87.612 LGSIL ON PAP SMEAR OF CERVIX: Primary | ICD-10-CM

## 2024-01-02 PROCEDURE — 3008F BODY MASS INDEX DOCD: CPT | Mod: CPTII,S$GLB,, | Performed by: OBSTETRICS & GYNECOLOGY

## 2024-01-02 PROCEDURE — 3074F SYST BP LT 130 MM HG: CPT | Mod: CPTII,S$GLB,, | Performed by: OBSTETRICS & GYNECOLOGY

## 2024-01-02 PROCEDURE — 88175 CYTOPATH C/V AUTO FLUID REDO: CPT | Performed by: OBSTETRICS & GYNECOLOGY

## 2024-01-02 PROCEDURE — 3078F DIAST BP <80 MM HG: CPT | Mod: CPTII,S$GLB,, | Performed by: OBSTETRICS & GYNECOLOGY

## 2024-01-02 PROCEDURE — 1159F MED LIST DOCD IN RCRD: CPT | Mod: CPTII,S$GLB,, | Performed by: OBSTETRICS & GYNECOLOGY

## 2024-01-02 PROCEDURE — 99213 OFFICE O/P EST LOW 20 MIN: CPT | Mod: S$GLB,,, | Performed by: OBSTETRICS & GYNECOLOGY

## 2024-01-02 PROCEDURE — 99999 PR PBB SHADOW E&M-EST. PATIENT-LVL III: CPT | Mod: PBBFAC,,, | Performed by: OBSTETRICS & GYNECOLOGY

## 2024-01-02 NOTE — PROGRESS NOTES
"  Chief Complaint   Patient presents with    repeat pap       History of Present Illness   33 y.o.  female   patient presents today for dx PAP...  PAP= LGSIL 2020, neg colpo  PAP #1 2020 = WNL (new dx HIV)  PAP #2= 21= ASCUS  PAP #3= 2021= ASCUS w pos HPV  PAP#4= 21= lgsil  Liletta IUD placed 2020  PAP #5= 2022 ASCUS w neg HPV  PAP #6= 2022, ascus w neg HR-HPV  PAP #7(23)= LGSIL   colpobx= ROMINA-1  PAP #1(23)= ASCUS  PAP #2(24)=       Past medical and surgical history reviewed.   I have reviewed the patient's medical history in detail and updated the computerized patient record.    Review of patient's allergies indicates:   Allergen Reactions    Eggs [egg derived] Nausea Only     Cannot take egg derived flu vaccine    Ondansetron Dermatitis    Sulfa (sulfonamide antibiotics) Nausea Only    Sulfamethoxazole-trimethoprim Diarrhea         Review of Systems - Negative except HPI  GEN ROS: negative for - chills or fever  Breast ROS: negative for breast lumps  Genito-Urinary ROS: no dysuria, trouble voiding, or hematuria      Physical Examination:  /66   Ht 5' 7" (1.702 m)   Wt 82.1 kg (181 lb)   LMP 2023 (Exact Date)   BMI 28.35 kg/m²    Constitutional: She appears alert and responsive. She appears well-developed, well-groomed, and well-nourished. No distress.  HENT:   Head: Normocephalic and atraumatic.   Eyes: Conjunctivae and EOM are normal. No scleral icterus.   Neck: Symmetrical. Normal range of motion. Neck supple. No tracheal deviation present.  Cardiovascular: Normal rate, no rhythm abnormality noted. Extremities without swelling or edema, warm.    Pulmonary/Chest: Normal respiratory Effort. No distress or retractions. She exhibits no tenderness.  Abdominal: Soft. She exhibits no distension, hernias or masses. There is no tenderness. No enlargement of liver edge or spleen.  There is no rebound and no guarding.   Genitourinary:    External " rectal exam shows no thrombosed external hemorrhoids, no lesions.     Pelvic exam was performed with patient supine.   No labial fusion, and symmetrical.    There is no rash, lesion or injury on the right labia.   There is no rash, lesion or injury on the left labia.   No bleeding and no signs of injury around the vaginal introitus, urethral meatus is normal size and without prolapse or lesions, urethra well supported. The cervix is visualized with no discharge, lesions or friability.   No vaginal discharge found.    No significant Cystocele, Enterocele or rectocele, and cervix and uterus well supported.   Bimanual exam:   The urethra is normal to palpation and there are no palpable vaginal wall masses.   Uterus is not deviated, not enlarged, not fixed, normal shape and not tender.   Cervix exhibits no motion tenderness.    Right adnexum displays no mass or nodularity and no tenderness.   Left adnexum displays no mass or nodularity and no tenderness.  Musculoskeletal: Normal range of motion.   Lymphadenopathy: No inguinal adenopathy present.   Neurological: She is alert and oriented to person, place, and time. Coordination normal.   Skin: Skin is warm and dry. She is not diaphoretic. No rashes, lesions or ulcers.   Psychiatric: She has a normal mood and affect, oriented to person, place, and time.            Assessment:  Normal exam, LGSIL PAP    Plan:  PAP #2 today, follow up 4 months PAP #3  Patient informed will be contacted with results within 2 weeks. Encouraged to please call back or email if she has not heard from us by then.

## 2024-01-08 ENCOUNTER — PATIENT MESSAGE (OUTPATIENT)
Dept: OBSTETRICS AND GYNECOLOGY | Facility: CLINIC | Age: 34
End: 2024-01-08
Payer: COMMERCIAL

## 2024-01-08 LAB
CLINICAL INFO: ABNORMAL
CYTO CVX: ABNORMAL
CYTOLOGIST CVX/VAG CYTO: ABNORMAL
CYTOLOGIST CVX/VAG CYTO: ABNORMAL
CYTOLOGY CMNT CVX/VAG CYTO-IMP: ABNORMAL
CYTOLOGY PAP THIN PREP EXPLANATION: ABNORMAL
DATE OF PREVIOUS PAP: ABNORMAL
DATE PREVIOUS BX: NO
GEN CATEG CVX/VAG CYTO-IMP: ABNORMAL
LMP START DATE: ABNORMAL
MICROORGANISM CVX/VAG CYTO: ABNORMAL
PATHOLOGIST CVX/VAG CYTO: ABNORMAL
SERVICE CMNT-IMP: ABNORMAL
SPECIMEN SOURCE CVX/VAG CYTO: ABNORMAL
STAT OF ADQ CVX/VAG CYTO-IMP: ABNORMAL

## 2024-02-05 RX ORDER — ERGOCALCIFEROL 1.25 MG/1
50000 CAPSULE ORAL
Qty: 12 CAPSULE | Refills: 3 | Status: SHIPPED | OUTPATIENT
Start: 2024-02-05

## 2024-02-08 RX ORDER — SERTRALINE HYDROCHLORIDE 50 MG/1
50 TABLET, FILM COATED ORAL DAILY
Qty: 90 TABLET | Refills: 3 | Status: SHIPPED | OUTPATIENT
Start: 2024-02-08 | End: 2025-02-07

## 2024-02-23 PROBLEM — Z21 HIV INFECTION: Status: ACTIVE | Noted: 2024-02-23

## 2024-02-23 PROBLEM — B20 HUMAN IMMUNODEFICIENCY VIRUS (HIV) DISEASE: Status: ACTIVE | Noted: 2024-02-23

## 2024-03-18 ENCOUNTER — OFFICE VISIT (OUTPATIENT)
Dept: FAMILY MEDICINE | Facility: CLINIC | Age: 34
End: 2024-03-18
Payer: COMMERCIAL

## 2024-03-18 VITALS
SYSTOLIC BLOOD PRESSURE: 120 MMHG | RESPIRATION RATE: 16 BRPM | TEMPERATURE: 98 F | OXYGEN SATURATION: 99 % | DIASTOLIC BLOOD PRESSURE: 78 MMHG | HEART RATE: 79 BPM | WEIGHT: 180 LBS | HEIGHT: 67 IN | BODY MASS INDEX: 28.25 KG/M2

## 2024-03-18 DIAGNOSIS — J01.40 ACUTE NON-RECURRENT PANSINUSITIS: Primary | ICD-10-CM

## 2024-03-18 DIAGNOSIS — J02.9 PHARYNGITIS, UNSPECIFIED ETIOLOGY: ICD-10-CM

## 2024-03-18 PROCEDURE — 3074F SYST BP LT 130 MM HG: CPT | Mod: CPTII,S$GLB,,

## 2024-03-18 PROCEDURE — 3078F DIAST BP <80 MM HG: CPT | Mod: CPTII,S$GLB,,

## 2024-03-18 PROCEDURE — 1159F MED LIST DOCD IN RCRD: CPT | Mod: CPTII,S$GLB,,

## 2024-03-18 PROCEDURE — 1160F RVW MEDS BY RX/DR IN RCRD: CPT | Mod: CPTII,S$GLB,,

## 2024-03-18 PROCEDURE — 3008F BODY MASS INDEX DOCD: CPT | Mod: CPTII,S$GLB,,

## 2024-03-18 PROCEDURE — 99213 OFFICE O/P EST LOW 20 MIN: CPT | Mod: S$GLB,,,

## 2024-03-18 PROCEDURE — 99999 PR PBB SHADOW E&M-EST. PATIENT-LVL IV: CPT | Mod: PBBFAC,,,

## 2024-03-18 RX ORDER — AMOXICILLIN AND CLAVULANATE POTASSIUM 875; 125 MG/1; MG/1
1 TABLET, FILM COATED ORAL EVERY 12 HOURS
Qty: 20 TABLET | Refills: 0 | Status: SHIPPED | OUTPATIENT
Start: 2024-03-18 | End: 2024-03-28

## 2024-03-18 NOTE — PROGRESS NOTES
Subjective:       Patient ID: Veronique Vick is a 33 y.o. female.    Chief Complaint: Sore Throat (Pt presents to the clinic for c/o sore throat for 1 week. Pt verbalized son testing positive for strep last week, denies fever, vomiting, nausea. //) and Dizziness (Pt verbalized since coming back from her trip from [place], she is experiencing on and off dizziness. )    Patient presents to the clinic with complaint of ear pain, sore throat, and dizziness.     States her son was recently treated for Strep. She is leaving this weekend to go on a cruise. Symptoms started 1 week ago. She has been taking Zyrtec Daily without relief.     Patient educated on plan of care, verbalized understanding.        Sinus Problem  This is a new problem. The current episode started in the past 7 days. The problem has been gradually worsening since onset. There has been no fever. Associated symptoms include congestion, ear pain, sinus pressure and a sore throat. Pertinent negatives include no chills, coughing, diaphoresis, shortness of breath or sneezing. Treatments tried: Zyrtec. The treatment provided no relief.     Review of Systems   Constitutional:  Negative for activity change, appetite change, chills, diaphoresis and fever.   HENT:  Positive for congestion, ear pain, sinus pressure, sinus pain and sore throat. Negative for postnasal drip and sneezing.    Eyes:  Negative for pain, discharge, redness and itching.   Respiratory:  Negative for apnea, cough, chest tightness, shortness of breath and wheezing.    Cardiovascular:  Negative for chest pain and leg swelling.   Gastrointestinal:  Negative for abdominal distention, abdominal pain, constipation, diarrhea, nausea and vomiting.   Genitourinary:  Negative for difficulty urinating, dysuria, flank pain and frequency.   Skin:  Negative for color change, rash and wound.   Neurological:  Negative for dizziness.   All other systems reviewed and are negative.      Patient  Active Problem List   Diagnosis    Pap smear of cervix with ASCUS, cannot exclude HGSIL    Migraine    Irritable bowel syndrome    Decreased strength    Muscle tightness    HIV infection       Objective:      Physical Exam  Vitals and nursing note reviewed.   Constitutional:       General: She is not in acute distress.     Appearance: Normal appearance. She is well-developed.   HENT:      Head: Normocephalic.      Right Ear: External ear normal. Tympanic membrane is not injected or erythematous. Tympanic membrane has decreased mobility.      Left Ear: External ear normal. Tympanic membrane is not erythematous. Tympanic membrane has normal mobility.      Nose: Nose normal.   Eyes:      Conjunctiva/sclera: Conjunctivae normal.      Pupils: Pupils are equal, round, and reactive to light.   Cardiovascular:      Rate and Rhythm: Normal rate and regular rhythm.      Heart sounds: Normal heart sounds.   Pulmonary:      Effort: Pulmonary effort is normal. No respiratory distress.      Breath sounds: Normal breath sounds.   Musculoskeletal:      Cervical back: Normal range of motion and neck supple.   Skin:     General: Skin is warm and dry.      Findings: No rash.   Neurological:      Mental Status: She is alert and oriented to person, place, and time.   Psychiatric:         Behavior: Behavior normal.         Lab Results   Component Value Date    WBC 5.44 12/18/2023    HGB 13.2 12/18/2023    HCT 38.3 12/18/2023     12/18/2023    CHOL 208 (H) 10/04/2022    TRIG 76 10/04/2022    HDL 64 10/04/2022    ALT 12 12/18/2023    AST 15 12/18/2023     12/18/2023    K 3.6 12/18/2023     12/18/2023    CREATININE 0.8 12/18/2023    BUN 8 12/18/2023    CO2 21 (L) 12/18/2023    TSH 0.421 11/15/2022    HGBA1C 5.0 04/17/2023     The ASCVD Risk score (Grace DK, et al., 2019) failed to calculate for the following reasons:    The 2019 ASCVD risk score is only valid for ages 40 to 79  Visit Vitals  /78 (BP Location: Right  "arm, Patient Position: Sitting, BP Method: Medium (Manual))   Pulse 79   Temp 97.8 °F (36.6 °C) (Oral)   Resp 16   Ht 5' 7" (1.702 m)   Wt 81.6 kg (180 lb)   LMP 03/14/2024   SpO2 99%   BMI 28.19 kg/m²      Assessment:       1. Acute non-recurrent pansinusitis    2. Pharyngitis, unspecified etiology        Plan:       1. Acute non-recurrent pansinusitis/Pharyngitis, unspecified etiology  -     amoxicillin-clavulanate 875-125mg (AUGMENTIN) 875-125 mg per tablet; Take 1 tablet by mouth every 12 (twelve) hours. for 10 days  Dispense: 20 tablet; Refill: 0   - Continue Zyrtec   - The diagnosis, treatment plan, instructions for follow-up and reevaluation as well as ED precautions were discussed and understanding was verbalized. All questions or concerns have been addressed.        Follow up if symptoms worsen or fail to improve.      Future Appointments       Date Provider Specialty Appt Notes    4/9/2024 Maximilian Amaya, MIA Optometry Routine eye exam    5/7/2024 Yevgeniy Fields MD Obstetrics and Gynecology Repeat pap/ hx abnormal pap             "

## 2024-03-19 NOTE — PATIENT INSTRUCTIONS

## 2024-03-20 ENCOUNTER — TELEPHONE (OUTPATIENT)
Dept: FAMILY MEDICINE | Facility: CLINIC | Age: 34
End: 2024-03-20
Payer: COMMERCIAL

## 2024-03-20 RX ORDER — PROCHLORPERAZINE MALEATE 10 MG
10 TABLET ORAL NIGHTLY
Qty: 5 TABLET | Refills: 0 | Status: SHIPPED | OUTPATIENT
Start: 2024-03-20 | End: 2024-03-25

## 2024-03-20 RX ORDER — SCOLOPAMINE TRANSDERMAL SYSTEM 1 MG/1
1 PATCH, EXTENDED RELEASE TRANSDERMAL
Qty: 4 PATCH | Refills: 0 | Status: SHIPPED | OUTPATIENT
Start: 2024-03-20

## 2024-03-20 NOTE — TELEPHONE ENCOUNTER
Spoke with patient in clinic about recent vertigo.  Notes recent flight.  When she lays down she gets dizzy.  Denies any ear pain.  Has had some cough and cold symptoms.    Notes plan to go on a cruise and needs the patch

## 2024-04-03 DIAGNOSIS — B20 HIV INFECTION, UNSPECIFIED SYMPTOM STATUS: ICD-10-CM

## 2024-04-03 RX ORDER — ABACAVIR SULFATE, DOLUTEGRAVIR SODIUM, LAMIVUDINE 600; 50; 300 MG/1; MG/1; MG/1
1 TABLET, FILM COATED ORAL DAILY
Qty: 90 TABLET | Refills: 1 | Status: SHIPPED | OUTPATIENT
Start: 2024-04-03 | End: 2024-09-30

## 2024-04-09 ENCOUNTER — OFFICE VISIT (OUTPATIENT)
Dept: OPTOMETRY | Facility: CLINIC | Age: 34
End: 2024-04-09
Payer: COMMERCIAL

## 2024-04-09 DIAGNOSIS — Z01.00 EXAMINATION OF EYES AND VISION: Primary | ICD-10-CM

## 2024-04-09 DIAGNOSIS — H52.7 REFRACTIVE ERROR: ICD-10-CM

## 2024-04-09 DIAGNOSIS — H04.123 DRY EYE SYNDROME, BILATERAL: ICD-10-CM

## 2024-04-09 PROCEDURE — 92015 DETERMINE REFRACTIVE STATE: CPT | Mod: S$GLB,,, | Performed by: OPTOMETRIST

## 2024-04-09 PROCEDURE — 99999 PR PBB SHADOW E&M-EST. PATIENT-LVL III: CPT | Mod: PBBFAC,,, | Performed by: OPTOMETRIST

## 2024-04-09 PROCEDURE — 92014 COMPRE OPH EXAM EST PT 1/>: CPT | Mod: S$GLB,,, | Performed by: OPTOMETRIST

## 2024-04-09 NOTE — PROGRESS NOTES
HPI     Annual Exam     Additional comments: LDE: 04/11/2023           Comments    32 YO female presents today for an annual eye exam. Patient states that   she is doing well, notes no problems or complaints. Wears glasses when   driving and occasionally at work.           Last edited by Clover Keith on 4/9/2024  1:16 PM.            Assessment /Plan     For exam results, see Encounter Report.    Examination of eyes and vision    Refractive error    Dry eye syndrome, bilateral      1. Examination of eyes and vision  Good ocular health OU -- no retinopathy    2. Refractive error  Dispensed updated spectacle Rx. Discussed various spectacle lens options. Discussed adaptation period to new specs.     3. Dry eye syndrome, bilateral  Well controlled  Continue otc ATs prn  Observe

## 2024-05-07 ENCOUNTER — OFFICE VISIT (OUTPATIENT)
Dept: NEUROLOGY | Facility: CLINIC | Age: 34
End: 2024-05-07
Payer: COMMERCIAL

## 2024-05-07 VITALS
HEIGHT: 67 IN | WEIGHT: 179.88 LBS | TEMPERATURE: 98 F | SYSTOLIC BLOOD PRESSURE: 117 MMHG | DIASTOLIC BLOOD PRESSURE: 80 MMHG | BODY MASS INDEX: 28.23 KG/M2 | HEART RATE: 62 BPM | RESPIRATION RATE: 17 BRPM

## 2024-05-07 DIAGNOSIS — G43.909 EPISODIC MIGRAINE: ICD-10-CM

## 2024-05-07 DIAGNOSIS — G43.829 MENSTRUAL MIGRAINE WITHOUT STATUS MIGRAINOSUS, NOT INTRACTABLE: Primary | ICD-10-CM

## 2024-05-07 PROCEDURE — 1159F MED LIST DOCD IN RCRD: CPT | Mod: CPTII,S$GLB,, | Performed by: PHYSICIAN ASSISTANT

## 2024-05-07 PROCEDURE — 99999 PR PBB SHADOW E&M-EST. PATIENT-LVL IV: CPT | Mod: PBBFAC,,, | Performed by: PHYSICIAN ASSISTANT

## 2024-05-07 PROCEDURE — 99213 OFFICE O/P EST LOW 20 MIN: CPT | Mod: S$GLB,,, | Performed by: PHYSICIAN ASSISTANT

## 2024-05-07 PROCEDURE — 3008F BODY MASS INDEX DOCD: CPT | Mod: CPTII,S$GLB,, | Performed by: PHYSICIAN ASSISTANT

## 2024-05-07 PROCEDURE — 3074F SYST BP LT 130 MM HG: CPT | Mod: CPTII,S$GLB,, | Performed by: PHYSICIAN ASSISTANT

## 2024-05-07 PROCEDURE — 1160F RVW MEDS BY RX/DR IN RCRD: CPT | Mod: CPTII,S$GLB,, | Performed by: PHYSICIAN ASSISTANT

## 2024-05-07 PROCEDURE — 3079F DIAST BP 80-89 MM HG: CPT | Mod: CPTII,S$GLB,, | Performed by: PHYSICIAN ASSISTANT

## 2024-05-07 RX ORDER — ATOGEPANT 60 MG/1
60 TABLET ORAL DAILY
Qty: 90 TABLET | Refills: 1 | Status: SHIPPED | OUTPATIENT
Start: 2024-05-07

## 2024-05-21 ENCOUNTER — OFFICE VISIT (OUTPATIENT)
Dept: OBSTETRICS AND GYNECOLOGY | Facility: CLINIC | Age: 34
End: 2024-05-21
Payer: COMMERCIAL

## 2024-05-21 VITALS
HEIGHT: 67 IN | BODY MASS INDEX: 28.07 KG/M2 | DIASTOLIC BLOOD PRESSURE: 76 MMHG | WEIGHT: 178.81 LBS | SYSTOLIC BLOOD PRESSURE: 110 MMHG

## 2024-05-21 DIAGNOSIS — R87.612 LGSIL ON PAP SMEAR OF CERVIX: Primary | ICD-10-CM

## 2024-05-21 PROCEDURE — 99213 OFFICE O/P EST LOW 20 MIN: CPT | Mod: S$GLB,,, | Performed by: OBSTETRICS & GYNECOLOGY

## 2024-05-21 PROCEDURE — 3078F DIAST BP <80 MM HG: CPT | Mod: CPTII,S$GLB,, | Performed by: OBSTETRICS & GYNECOLOGY

## 2024-05-21 PROCEDURE — 99999 PR PBB SHADOW E&M-EST. PATIENT-LVL III: CPT | Mod: PBBFAC,,, | Performed by: OBSTETRICS & GYNECOLOGY

## 2024-05-21 PROCEDURE — 3008F BODY MASS INDEX DOCD: CPT | Mod: CPTII,S$GLB,, | Performed by: OBSTETRICS & GYNECOLOGY

## 2024-05-21 PROCEDURE — 88175 CYTOPATH C/V AUTO FLUID REDO: CPT | Performed by: OBSTETRICS & GYNECOLOGY

## 2024-05-21 PROCEDURE — 1159F MED LIST DOCD IN RCRD: CPT | Mod: CPTII,S$GLB,, | Performed by: OBSTETRICS & GYNECOLOGY

## 2024-05-21 PROCEDURE — 3074F SYST BP LT 130 MM HG: CPT | Mod: CPTII,S$GLB,, | Performed by: OBSTETRICS & GYNECOLOGY

## 2024-05-21 NOTE — PROGRESS NOTES
"    Chief Complaint   Patient presents with    Repeat PAP       History of Present Illness   33 y.o.  female   patient presents today for dx PAP...  PAP= LGSIL 2020, neg colpo  PAP #1 2020 = WNL (new dx HIV)  PAP #2= 21= ASCUS  PAP #3= 2021= ASCUS w pos HPV  PAP#4= 21= lgsil  Liletta IUD placed 2020  PAP #5= 2022 ASCUS w neg HPV  PAP #6= 2022, ascus w neg HR-HPV  PAP #7(23)= LGSIL   colpobx= ROMINA-1  PAP #1(23)= ASCUS  PAP #2(24)= LGSIL        Past medical and surgical history reviewed.   I have reviewed the patient's medical history in detail and updated the computerized patient record.    Review of patient's allergies indicates:   Allergen Reactions    Eggs [egg derived] Nausea Only     Cannot take egg derived flu vaccine    Ondansetron Dermatitis    Sulfa (sulfonamide antibiotics) Nausea Only    Sulfamethoxazole-trimethoprim Diarrhea         Review of Systems - Negative except HPI  GEN ROS: negative for - chills or fever  Breast ROS: negative for breast lumps  Genito-Urinary ROS: no dysuria, trouble voiding, or hematuria      Physical Examination:  Ht 5' 7" (1.702 m)   LMP 2024   BMI 28.18 kg/m²    Constitutional: She appears alert and responsive. She appears well-developed, well-groomed, and well-nourished. No distress.  HENT:   Head: Normocephalic and atraumatic.   Eyes: Conjunctivae and EOM are normal. No scleral icterus.   Neck: Symmetrical. Normal range of motion. Neck supple. No tracheal deviation present.  Cardiovascular: Normal rate, no rhythm abnormality noted. Extremities without swelling or edema, warm.    Pulmonary/Chest: Normal respiratory Effort. No distress or retractions. She exhibits no tenderness.  Abdominal: Soft. She exhibits no distension, hernias or masses. There is no tenderness. No enlargement of liver edge or spleen.  There is no rebound and no guarding.   Genitourinary:    External rectal exam shows no thrombosed external " hemorrhoids, no lesions.     Pelvic exam was performed with patient supine.   No labial fusion, and symmetrical.    There is no rash, lesion or injury on the right labia.   There is no rash, lesion or injury on the left labia.   No bleeding and no signs of injury around the vaginal introitus, urethral meatus is normal size and without prolapse or lesions, urethra well supported. The cervix is visualized with no discharge, lesions or friability.   No vaginal discharge found.    No significant Cystocele, Enterocele or rectocele, and cervix and uterus well supported.   Bimanual exam:   The urethra is normal to palpation and there are no palpable vaginal wall masses.   Uterus is not deviated, not enlarged, not fixed, normal shape and not tender.   Cervix exhibits no motion tenderness.    Right adnexum displays no mass or nodularity and no tenderness.   Left adnexum displays no mass or nodularity and no tenderness.  Musculoskeletal: Normal range of motion.   Lymphadenopathy: No inguinal adenopathy present.   Neurological: She is alert and oriented to person, place, and time. Coordination normal.   Skin: Skin is warm and dry. She is not diaphoretic. No rashes, lesions or ulcers.   Psychiatric: She has a normal mood and affect, oriented to person, place, and time.            Assessment:  Normal exam, LGSIL PAP    Plan:  PAP #3 today, follow up 4 months PAP #4  Patient informed will be contacted with results within 2 weeks. Encouraged to please call back or email if she has not heard from us by then.

## 2024-05-28 ENCOUNTER — PATIENT MESSAGE (OUTPATIENT)
Dept: OBSTETRICS AND GYNECOLOGY | Facility: CLINIC | Age: 34
End: 2024-05-28
Payer: COMMERCIAL

## 2024-06-25 ENCOUNTER — OFFICE VISIT (OUTPATIENT)
Dept: INFECTIOUS DISEASES | Facility: CLINIC | Age: 34
End: 2024-06-25
Payer: COMMERCIAL

## 2024-06-25 VITALS
DIASTOLIC BLOOD PRESSURE: 72 MMHG | HEART RATE: 72 BPM | HEIGHT: 67 IN | WEIGHT: 181.69 LBS | BODY MASS INDEX: 28.52 KG/M2 | TEMPERATURE: 97 F | SYSTOLIC BLOOD PRESSURE: 118 MMHG

## 2024-06-25 DIAGNOSIS — Z13.1 DIABETES MELLITUS SCREENING: ICD-10-CM

## 2024-06-25 DIAGNOSIS — R87.611 PAP SMEAR OF CERVIX WITH ASCUS, CANNOT EXCLUDE HGSIL: ICD-10-CM

## 2024-06-25 DIAGNOSIS — D72.10 EOSINOPHILIA, UNSPECIFIED TYPE: ICD-10-CM

## 2024-06-25 DIAGNOSIS — E55.9 VITAMIN D DEFICIENCY: ICD-10-CM

## 2024-06-25 DIAGNOSIS — B20 HIV INFECTION, UNSPECIFIED SYMPTOM STATUS: Primary | ICD-10-CM

## 2024-06-25 PROCEDURE — 3074F SYST BP LT 130 MM HG: CPT | Mod: CPTII,S$GLB,, | Performed by: STUDENT IN AN ORGANIZED HEALTH CARE EDUCATION/TRAINING PROGRAM

## 2024-06-25 PROCEDURE — 3008F BODY MASS INDEX DOCD: CPT | Mod: CPTII,S$GLB,, | Performed by: STUDENT IN AN ORGANIZED HEALTH CARE EDUCATION/TRAINING PROGRAM

## 2024-06-25 PROCEDURE — 99214 OFFICE O/P EST MOD 30 MIN: CPT | Mod: S$GLB,,, | Performed by: STUDENT IN AN ORGANIZED HEALTH CARE EDUCATION/TRAINING PROGRAM

## 2024-06-25 PROCEDURE — 3078F DIAST BP <80 MM HG: CPT | Mod: CPTII,S$GLB,, | Performed by: STUDENT IN AN ORGANIZED HEALTH CARE EDUCATION/TRAINING PROGRAM

## 2024-06-25 PROCEDURE — 1159F MED LIST DOCD IN RCRD: CPT | Mod: CPTII,S$GLB,, | Performed by: STUDENT IN AN ORGANIZED HEALTH CARE EDUCATION/TRAINING PROGRAM

## 2024-06-25 RX ORDER — BICTEGRAVIR SODIUM, EMTRICITABINE, AND TENOFOVIR ALAFENAMIDE FUMARATE 50; 200; 25 MG/1; MG/1; MG/1
1 TABLET ORAL DAILY
Qty: 30 TABLET | Refills: 11 | Status: SHIPPED | OUTPATIENT
Start: 2024-06-25 | End: 2025-06-25

## 2024-06-25 NOTE — PROGRESS NOTES
[facility] - Infectious Diseases   Department of Infectious Disease  Office Visit Note        PATIENT NAME: Veronique Vick  YOB: 1990   MRN: [id number]  DATE OF VISIT: 2024    REASON FOR VISIT: Follow-up and HIV Positive/AIDS      HISTORY OF PRESENT ILLNESS     Veronique Vick is a 33 y.o. female  very pleasant  with past medical history of abnormal Pap smear and HIV diagnosed in  on Triumeq.  She was established patient with Dr. Ledesma, here to establish care.    Patient is a very good historian, mentions she is feeling much better now that she recovered custody of her kid.  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  She mentioned she would like to have her uterus removed keeping history of prior abnormal HPV, she is not very happy having to have serial Pap smears at least 3 a year.      She denies any constitutional symptoms, no acute complaints.  She mentions the Triumeq pill is too big and usually gets nauseous and unable to fully tolerate.  She reports adherence to medication.  Discussed with patient and she is interested in switching to Biktarvy, will send prescription to the pharmacy.      CURRENT ART: Triumeq     HIV HISTORY:  Diagnosed  - CD4 1038, ratio 41.5% and VL at diagnosis 1527  Risk factor - unprotected sex with infected partner who did not disclose information  History OI - none  History STIs - HPV  Prior ART:   - Triumeq  Genotype testing 20:  PRB = PROBABLE OR EMERGING RESISTANCE   OTHER MUTATIONS DETECTED:   RT GENE MUTATIONS: F214L   ND GENE MUTATIONS: D60E,L63Q,I64V,V77I    HLA- testing negative 20  G6PD testing not performed    Outdoor activities:  Nonsmoker, no alcohol, no drugs.  Works at a medical office.  Travel:  None recent  Implants:  None  Antibiotic history:  None    Social History  Marital Status: Single  Alcohol History:  reports current alcohol use.  Tobacco History:  reports that she has never  smoked. She has never used smokeless tobacco.  Drug History:  reports no history of drug use.      INTERVAL HISTORY     06/25/2024 6/25/2024: Here to establish care.      ALLERGIES AND CURRENT MEDICATIONS     Review of patient's allergies indicates:   Allergen Reactions    Eggs [egg derived] Nausea Only     Cannot take egg derived flu vaccine    Ondansetron Dermatitis    Sulfa (sulfonamide antibiotics) Nausea Only    Sulfamethoxazole-trimethoprim Diarrhea       Current Outpatient Medications   Medication Sig Dispense Refill    atogepant (QULIPTA) 60 mg Tab Take 1 tablet (60 mg) by mouth once daily. 90 tablet 1    cyclobenzaprine (FLEXERIL) 10 MG tablet Take 1 tablet (10 mg total) by mouth 3 (three) times daily as needed for Muscle spasms. 20 tablet 2    ergocalciferol (ERGOCALCIFEROL) 50,000 unit Cap Take 1 capsule (50,000 Units total) by mouth every 7 days. 12 capsule 3    promethazine (PHENERGAN) 12.5 MG Tab Take 12.5 mg by mouth every 4 (four) hours as needed.      rizatriptan (MAXALT-MLT) 5 MG disintegrating tablet Dissolve 1 tablet in mouth at onset of headache; dissolve another tablet in mouth two hours later if needed; DO NOT USE NO MORE THAN 2 TABLETS PER DAY OR 3 DAYS PER WEEK. 10 tablet 5    scopolamine (TRANSDERM-SCOP) 1.3-1.5 mg (1 mg over 3 days) Place 1 patch onto the skin every 72 hours. 4 patch 0    sertraline (ZOLOFT) 50 MG tablet Take 1 tablet (50 mg total) by mouth once daily. 90 tablet 3    pygnuvhzv-xxnuxogu-seggdga ala (BIKTARVY) -25 mg (25 kg or greater) Take 1 tablet by mouth once daily. 30 tablet 11    buPROPion (WELLBUTRIN XL) 150 MG TB24 tablet Take 1 tablet (150 mg total) by mouth once daily. 30 tablet 11     No current facility-administered medications for this visit.       MEDICAL/SURGICAL/FAMILY HISTORY     Past Surgical History:   Procedure Laterality Date    colposcopy      multiple    ORIF RADIUS & ULNA FRACTURES Left 2016    WISDOM TOOTH EXTRACTION       Past Medical History:    Diagnosis Date    Closed fracture of distal end of left radius with routine healing 3/31/2016    Wrist fracture     left     Family History   Problem Relation Name Age of Onset    Migraines Mother      Hyperlipidemia Father      Hypertension Father      Migraines Sister            REVIEW OF SYSTEMS     Review of Systems  Constitutional:  Denies fevers, chills, night sweats, loss of appetite.  HEENT: Denies visual changes,ear pain, sinus congestion, mouth pain or trouble swallowing, sore throat or  dental pain.  Neck: Denies neck pain or lumps.  Respiratory: Denies shortness of breath, coughing, wheezing or hemoptysis.  Cardiovascular:  Denies chest pain, palpitations or edema.  Gastrointestinal: Denies  nausea, vomiting, constipation or diarrhea.  Genitourinary:  Denies dysuria, frequency, urgency or hematuria   Musculoskeletal:  Denies joint pain or swelling, difficulty walking.    Skin:  Denies rash or itching.  Neurologic:  Denies motor or sensory loss, headaches or dizziness.    Psychiatric:  Denies changes in mood or behavior.      PHYSICAL EXAM     Vital Signs  Wt Readings from Last 3 Encounters:   06/25/24 82.4 kg (181 lb 11.2 oz)   05/21/24 81.1 kg (178 lb 12.7 oz)   05/07/24 81.6 kg (179 lb 14.3 oz)     Temp Readings from Last 3 Encounters:   06/25/24 97.1 °F (36.2 °C)   05/07/24 97.6 °F (36.4 °C) (Temporal)   03/18/24 97.8 °F (36.6 °C) (Oral)     BP Readings from Last 3 Encounters:   06/25/24 118/72   05/21/24 110/76   05/07/24 117/80     Pulse Readings from Last 3 Encounters:   06/25/24 72   05/07/24 62   03/18/24 79       Physical Exam  General:  Very pleasant  female, breathing comfortable on room air  Eyes: Eyes with no icterus or injection. Vision grossly normal  Ears: Hearing grossly normal.  Nose: Nares patent  Mouth: Moist mucous membranes, dentition is very good. No ulcerations, erythema or exudates.  Neck: Supple, no tenderness to palpation.  Cardiovascular: Regular rate and rhythm, no  murmurs, no edema.    Respiratory:  Clear to auscultation bilaterally, no tachypnea or increased work of breathing.  Gastrointestinal:  Soft with active bowel sounds, no tenderness to palpation, no distention.  Genitourinary:  No suprapubic tenderness.  Musculoskeletal:  Moves all extremities with equal strength.    Skin:  Warm and dry, no obvious rashes.    Neuro:   Oriented, conversant, follows commands.  Psych: Good mood, normal affect.      HEALTH MAINTENANCE     Health Screening:    The following items were screened for at today's visit:  [] Substance Abuse  [] Alcohol Abuse  [] Depression  [] Employment  [] Housing Situation/Homelessness  [] Travel  [] TB Exposure   [] Domestic Abuse   [] Smoking Cessation    Travel History: [place], [place], [place] (cruise)  Vaccine History: Hep B series (6th grade), Tdap 2/2018, yearly flu vaccine(egg allergy), zostavax 2013, COVID x 3 2021, menactra #1 2022,   Advanced Directive:   Safer Sex:  abstinent currently  Gyn: (Dr. Fields) UTD, repeat PAP 4/2022, 10/22 abnormal but improved, 5/2023 with CIN1, HPV negative now, still ASCUS on last pap  Mammogram: at age 39  Bone Density: at age 64  Colonoscopy: at age 39    Vaccines    Immunization History   Administered Date(s) Administered    COVID-19, MRNA, LN-S, PF (Pfizer) (Purple Cap) 12/17/2020, 01/07/2021, 12/28/2021    DTaP 1989, 02/06/1990, 03/27/1990, 05/22/1991, 07/18/1995    HIB 01/02/1991    Hepatitis A, Adult 09/09/2020, 03/08/2021    Influenza (FLUBLOK) - Quadrivalent - Recombinant - PF *Preferred* (egg allergy) 02/04/2020, 10/13/2020, 10/28/2021, 12/01/2022    Influenza - Quadrivalent 09/10/2020    Influenza - Quadrivalent - MDCK - PF 11/14/2023    MMR 01/02/1991, 07/18/1995    Meningococcal Conjugate (MCV4O) 1 Vial Dose(10yr-55yr) 01/13/2022    Meningococcal Conjugate (MCV4P) 01/13/2022    OPV 1989, 02/06/1990, 05/22/1991, 07/18/1995    Pneumococcal Conjugate - 13 Valent 12/07/2020    Pneumococcal  "Polysaccharide - 23 Valent 02/12/2021       ASCVD Risk Score:  Consider high-intensity statin therapy if 10-year ASCVD risk score >7.5%  The ASCVD Risk score (Willard DK, et al., 2019) failed to calculate for the following reasons:    The 2019 ASCVD risk score is only valid for ages 40 to 79    LABS AND DIAGNOSTICS     Significant Labs: I have reviewed all relevant and available labs and microbiology. .    HIV Viral Load:   HIV VL: undetectable, Date: 12/18/23      CD4 Count:1265, ratio 49.5%    No results found for: "WBCABSOLUTE", "LYMPHPERCENT", "LYMPHABSOLUT", "ZB7QGZTRIIC", "TCELLINTERP"    CBC with Differential:   Lab Results   Component Value Date    WBC 5.44 12/18/2023    RBC 4.09 12/18/2023    HGB 13.2 12/18/2023    HCT 38.3 12/18/2023    MCV 94 12/18/2023    MCH 32.3 (H) 12/18/2023    MCHC 34.5 12/18/2023    RDW 12.2 12/18/2023     12/18/2023    MPV 12.1 12/18/2023       CMP:   Lab Results   Component Value Date     12/18/2023    K 3.6 12/18/2023     12/18/2023    ANIONGAP 9 12/18/2023    BUN 8 12/18/2023    CREATININE 0.8 12/18/2023    CALCIUM 8.5 (L) 12/18/2023    BILITOT 0.7 12/18/2023    AST 15 12/18/2023    ALT 12 12/18/2023    ALKPHOS 49 (L) 12/18/2023    PROT 6.6 12/18/2023    ALBUMIN 3.9 12/18/2023       Quantiferon:   TB Gold: Negative Date: 12/18/2023    Syphilis IgG: Negative 6/19/23    Toxoplasmosis IgG: POSITIVE 8/17/20    No results found for: "NGONNO", "LABCHLAPCR"    Lipid Panel: check q1yr  Lab Results   Component Value Date    TRIG 76 10/04/2022    CHOL 208 (H) 10/04/2022    HDL 64 10/04/2022    NONHDLCHOL 144 10/04/2022    LDLCALC 128.8 10/04/2022       A1C: check q1yr  Lab Results   Component Value Date    HGBA1C 5.0 04/17/2023       Urinalysis: check q1yr, check q6mo if taking tenofovir  Lab Results   Component Value Date    PROTEINUA Negative 07/05/2022    UROBILINOGEN 0.2 05/31/2023    KETONESU Negative 05/31/2023    NITRITE Negative 05/31/2023    LEUKOCYTESUR 1+ (A) " "07/05/2022    COLORU Yellow 05/31/2023    RBCUA 20 (H) 07/05/2022         Hepatitis A IgG: negative 8/17/20     Hepatitis B Surface Antibody: check once  Lab Results   Component Value Date    HEPBSAB Reactive 08/17/2020       Hepatitis C Antibody: check q1yr if high risk, once if low risk  Lab Results   Component Value Date    HEPCAB Negative 07/21/2022         CrCl cannot be calculated (Patient's most recent lab result is older than the maximum 7 days allowed.).    INFLAMMATORY/PROCAL  LAST 7  @LABRCNTIP[PROCAL:7, ESR:7, CRP:7@  No results found for: "ESR"  No results found for: "CRP"    PRIOR MICROBIOLOGY:  No results found for the last 90 days.      LAST 7 DAYS MICROBIOLOGY   Microbiology Results (last 7 days)       ** No results found for the last 168 hours. **            Pathology:  5/21/24: PAP smear: Cytology Results: Negative for intraepithelial lesion or malignancy.    Significant Diagnostics: I have reviewed all relevant and available diagnostic results.    IMAGING  DEXA Scan: if age>50, check q10yrs  No results found for this or any previous visit.    CURRENT/PREVIOUS VISIT EKG  No results found for this or any previous visit.    ASSESSMENT AND PLAN:     HIV stable on Triumeq  She is interested in switching medication, mentions Triumeq pill is too big to swallow  Labs tomorrow at [facility], will call her with results   RTC in 6 months     HIV infection, unspecified symptom status  -     CBC Auto Differential; Future; Expected date: 06/25/2024  -     Comprehensive Metabolic Panel; Future; Expected date: 06/25/2024  -     CD4 T-[place] Cells; Future; Expected date: 06/25/2024  -     HIV RNA, Quantitative, PCR; Future; Expected date: 06/25/2024  -     Lipid Panel; Future; Expected date: 06/26/2024  -     TSH w/reflex to FT4; Future; Expected date: 06/26/2024  -     bpynaiofw-aahwlahu-hirzjyn ala (BIKTARVY) -25 mg (25 kg or greater); Take 1 tablet by mouth once daily.  Dispense: 30 tablet; Refill: 11    Pap " smear of cervix with ASCUS, cannot exclude HGSIL    Eosinophilia, unspecified type    Vitamin D deficiency  -     Calcitriol; Future; Expected date: 06/25/2024    Diabetes mellitus screening  -     HEMOGLOBIN A1C; Future; Expected date: 06/26/2024        Follow up in about 6 months (around 12/25/2024).      No LOS data to display        Palmira Miller MD  Date of Service: 06/25/2024      This note was created using [employer]ios  voice recognition software that occasionally misinterpreted phrases or words.

## 2024-06-26 ENCOUNTER — LAB VISIT (OUTPATIENT)
Dept: LAB | Facility: HOSPITAL | Age: 34
End: 2024-06-26
Attending: STUDENT IN AN ORGANIZED HEALTH CARE EDUCATION/TRAINING PROGRAM
Payer: COMMERCIAL

## 2024-06-26 DIAGNOSIS — B20 HIV INFECTION, UNSPECIFIED SYMPTOM STATUS: ICD-10-CM

## 2024-06-26 DIAGNOSIS — Z13.1 DIABETES MELLITUS SCREENING: ICD-10-CM

## 2024-06-26 DIAGNOSIS — E55.9 VITAMIN D DEFICIENCY: ICD-10-CM

## 2024-06-26 LAB
ALBUMIN SERPL BCP-MCNC: 4.1 G/DL (ref 3.5–5.2)
ALP SERPL-CCNC: 52 U/L (ref 55–135)
ALT SERPL W/O P-5'-P-CCNC: 11 U/L (ref 10–44)
ANION GAP SERPL CALC-SCNC: 7 MMOL/L (ref 8–16)
AST SERPL-CCNC: 14 U/L (ref 10–40)
BASOPHILS # BLD AUTO: 0.03 K/UL (ref 0–0.2)
BASOPHILS NFR BLD: 0.5 % (ref 0–1.9)
BILIRUB SERPL-MCNC: 0.5 MG/DL (ref 0.1–1)
BUN SERPL-MCNC: 9 MG/DL (ref 6–20)
CALCIUM SERPL-MCNC: 9.6 MG/DL (ref 8.7–10.5)
CHLORIDE SERPL-SCNC: 110 MMOL/L (ref 95–110)
CHOLEST SERPL-MCNC: 192 MG/DL (ref 120–199)
CHOLEST/HDLC SERPL: 3.6 {RATIO} (ref 2–5)
CO2 SERPL-SCNC: 23 MMOL/L (ref 23–29)
CREAT SERPL-MCNC: 1.1 MG/DL (ref 0.5–1.4)
DIFFERENTIAL METHOD BLD: ABNORMAL
EOSINOPHIL # BLD AUTO: 0.1 K/UL (ref 0–0.5)
EOSINOPHIL NFR BLD: 1.3 % (ref 0–8)
ERYTHROCYTE [DISTWIDTH] IN BLOOD BY AUTOMATED COUNT: 12 % (ref 11.5–14.5)
EST. GFR  (NO RACE VARIABLE): >60 ML/MIN/1.73 M^2
ESTIMATED AVG GLUCOSE: 94 MG/DL (ref 68–131)
GLUCOSE SERPL-MCNC: 84 MG/DL (ref 70–110)
HBA1C MFR BLD: 4.9 % (ref 4–5.6)
HCT VFR BLD AUTO: 39.6 % (ref 37–48.5)
HDLC SERPL-MCNC: 54 MG/DL (ref 40–75)
HDLC SERPL: 28.1 % (ref 20–50)
HGB BLD-MCNC: 13.6 G/DL (ref 12–16)
IMM GRANULOCYTES # BLD AUTO: 0.01 K/UL (ref 0–0.04)
IMM GRANULOCYTES NFR BLD AUTO: 0.2 % (ref 0–0.5)
LDLC SERPL CALC-MCNC: 125 MG/DL (ref 63–159)
LYMPHOCYTES # BLD AUTO: 2.4 K/UL (ref 1–4.8)
LYMPHOCYTES NFR BLD: 38.4 % (ref 18–48)
MCH RBC QN AUTO: 32.5 PG (ref 27–31)
MCHC RBC AUTO-ENTMCNC: 34.3 G/DL (ref 32–36)
MCV RBC AUTO: 95 FL (ref 82–98)
MONOCYTES # BLD AUTO: 0.5 K/UL (ref 0.3–1)
MONOCYTES NFR BLD: 7.3 % (ref 4–15)
NEUTROPHILS # BLD AUTO: 3.3 K/UL (ref 1.8–7.7)
NEUTROPHILS NFR BLD: 52.3 % (ref 38–73)
NONHDLC SERPL-MCNC: 138 MG/DL
NRBC BLD-RTO: 0 /100 WBC
PLATELET # BLD AUTO: 190 K/UL (ref 150–450)
PMV BLD AUTO: 12.1 FL (ref 9.2–12.9)
POTASSIUM SERPL-SCNC: 4 MMOL/L (ref 3.5–5.1)
PROT SERPL-MCNC: 6.9 G/DL (ref 6–8.4)
RBC # BLD AUTO: 4.18 M/UL (ref 4–5.4)
SODIUM SERPL-SCNC: 140 MMOL/L (ref 136–145)
TRIGL SERPL-MCNC: 65 MG/DL (ref 30–150)
WBC # BLD AUTO: 6.2 K/UL (ref 3.9–12.7)

## 2024-06-26 PROCEDURE — 87536 HIV-1 QUANT&REVRSE TRNSCRPJ: CPT | Performed by: STUDENT IN AN ORGANIZED HEALTH CARE EDUCATION/TRAINING PROGRAM

## 2024-06-26 PROCEDURE — 86361 T CELL ABSOLUTE COUNT: CPT | Performed by: STUDENT IN AN ORGANIZED HEALTH CARE EDUCATION/TRAINING PROGRAM

## 2024-06-26 PROCEDURE — 82652 VIT D 1 25-DIHYDROXY: CPT | Performed by: STUDENT IN AN ORGANIZED HEALTH CARE EDUCATION/TRAINING PROGRAM

## 2024-06-26 PROCEDURE — 86480 TB TEST CELL IMMUN MEASURE: CPT | Performed by: STUDENT IN AN ORGANIZED HEALTH CARE EDUCATION/TRAINING PROGRAM

## 2024-06-26 PROCEDURE — 85025 COMPLETE CBC W/AUTO DIFF WBC: CPT | Performed by: STUDENT IN AN ORGANIZED HEALTH CARE EDUCATION/TRAINING PROGRAM

## 2024-06-26 PROCEDURE — 80061 LIPID PANEL: CPT | Performed by: STUDENT IN AN ORGANIZED HEALTH CARE EDUCATION/TRAINING PROGRAM

## 2024-06-26 PROCEDURE — 83036 HEMOGLOBIN GLYCOSYLATED A1C: CPT | Performed by: STUDENT IN AN ORGANIZED HEALTH CARE EDUCATION/TRAINING PROGRAM

## 2024-06-26 PROCEDURE — 80053 COMPREHEN METABOLIC PANEL: CPT | Performed by: STUDENT IN AN ORGANIZED HEALTH CARE EDUCATION/TRAINING PROGRAM

## 2024-06-27 LAB
CD3+CD4+ CELLS # BLD: 1290 CELLS/UL (ref 300–1400)
CD3+CD4+ CELLS NFR BLD: 49.8 % (ref 28–57)

## 2024-06-28 LAB
HIV1 RNA # SERPL NAA+PROBE: NOT DETECTED COPIES/ML
HIV1 RNA SERPL QL NAA+PROBE: NOT DETECTED

## 2024-06-29 LAB
GAMMA INTERFERON BACKGROUND BLD IA-ACNC: 0.02 IU/ML
M TB IFN-G CD4+ BCKGRND COR BLD-ACNC: -0 IU/ML
M TB IFN-G CD4+ BCKGRND COR BLD-ACNC: 0.02 IU/ML
MITOGEN IGNF BCKGRD COR BLD-ACNC: 9.98 IU/ML
TB GOLD PLUS: NEGATIVE

## 2024-07-01 LAB — 1,25(OH)2D3 SERPL-MCNC: 93 PG/ML (ref 20–79)

## 2024-08-14 ENCOUNTER — PATIENT MESSAGE (OUTPATIENT)
Dept: ADMINISTRATIVE | Facility: OTHER | Age: 34
End: 2024-08-14
Payer: COMMERCIAL

## 2024-09-26 DIAGNOSIS — J03.90 TONSILLITIS: Primary | ICD-10-CM

## 2024-09-26 DIAGNOSIS — J40 BRONCHITIS: ICD-10-CM

## 2024-09-26 RX ORDER — METRONIDAZOLE 500 MG/1
500 TABLET ORAL EVERY 12 HOURS
Qty: 20 TABLET | Refills: 0 | Status: SHIPPED | OUTPATIENT
Start: 2024-09-26 | End: 2024-10-10

## 2024-10-01 ENCOUNTER — OFFICE VISIT (OUTPATIENT)
Dept: OBSTETRICS AND GYNECOLOGY | Facility: CLINIC | Age: 34
End: 2024-10-01
Payer: COMMERCIAL

## 2024-10-01 VITALS
DIASTOLIC BLOOD PRESSURE: 82 MMHG | BODY MASS INDEX: 28.58 KG/M2 | HEIGHT: 67 IN | WEIGHT: 182.13 LBS | SYSTOLIC BLOOD PRESSURE: 122 MMHG

## 2024-10-01 DIAGNOSIS — R87.612 LGSIL ON PAP SMEAR OF CERVIX: Primary | ICD-10-CM

## 2024-10-01 LAB
CLINICAL INFO: NORMAL
DATE OF PREVIOUS PAP: NORMAL
DATE PREVIOUS BX: NO
LMP START DATE: NORMAL
SPECIMEN SOURCE CVX/VAG CYTO: NORMAL

## 2024-10-01 PROCEDURE — 3074F SYST BP LT 130 MM HG: CPT | Mod: CPTII,S$GLB,, | Performed by: OBSTETRICS & GYNECOLOGY

## 2024-10-01 PROCEDURE — 99213 OFFICE O/P EST LOW 20 MIN: CPT | Mod: S$GLB,,, | Performed by: OBSTETRICS & GYNECOLOGY

## 2024-10-01 PROCEDURE — 1159F MED LIST DOCD IN RCRD: CPT | Mod: CPTII,S$GLB,, | Performed by: OBSTETRICS & GYNECOLOGY

## 2024-10-01 PROCEDURE — 88175 CYTOPATH C/V AUTO FLUID REDO: CPT | Performed by: OBSTETRICS & GYNECOLOGY

## 2024-10-01 PROCEDURE — 3008F BODY MASS INDEX DOCD: CPT | Mod: CPTII,S$GLB,, | Performed by: OBSTETRICS & GYNECOLOGY

## 2024-10-01 PROCEDURE — 99999 PR PBB SHADOW E&M-EST. PATIENT-LVL III: CPT | Mod: PBBFAC,,, | Performed by: OBSTETRICS & GYNECOLOGY

## 2024-10-01 PROCEDURE — 3079F DIAST BP 80-89 MM HG: CPT | Mod: CPTII,S$GLB,, | Performed by: OBSTETRICS & GYNECOLOGY

## 2024-10-01 PROCEDURE — 3044F HG A1C LEVEL LT 7.0%: CPT | Mod: CPTII,S$GLB,, | Performed by: OBSTETRICS & GYNECOLOGY

## 2024-10-01 NOTE — PROGRESS NOTES
"    Chief Complaint   Patient presents with    Abnormal Pap Smear       History of Present Illness     34 y.o.  female, reports heavier prolonged menses now,       patient presents today for dx PAP...  PAP= LGSIL 2020, neg colpo  PAP #1 2020 = WNL (new dx HIV)  PAP #2= 21= ASCUS  PAP #3= 2021= ASCUS w pos HPV  PAP#4= 21= lgsil  Liletta IUD placed 2020  PAP #5= 2022 ASCUS w neg HPV  PAP #6= 2022, ascus w neg HR-HPV  PAP #7(23)= LGSIL   colpobx= ROMINA-1  PAP #1(23)= ASCUS  PAP #2(24)= LGSIL  PAP 2024= WNL  Repeat PAP today, plan hyst if > or = LGSIL        Past medical and surgical history reviewed.   I have reviewed the patient's medical history in detail and updated the computerized patient record.    Review of patient's allergies indicates:   Allergen Reactions    Eggs [egg derived] Nausea Only     Cannot take egg derived flu vaccine    Ondansetron Dermatitis    Sulfa (sulfonamide antibiotics) Nausea Only    Sulfamethoxazole-trimethoprim Diarrhea         Review of Systems - Negative except HPI  GEN ROS: negative for - chills or fever  Breast ROS: negative for breast lumps  Genito-Urinary ROS: no dysuria, trouble voiding, or hematuria      Physical Examination:  /82 (Patient Position: Sitting)   Ht 5' 7" (1.702 m)   Wt 82.6 kg (182 lb 1.6 oz)   LMP 09/10/2024 (Approximate)   BMI 28.52 kg/m²    Constitutional: She appears alert and responsive. She appears well-developed, well-groomed, and well-nourished. No distress.  HENT:   Head: Normocephalic and atraumatic.   Eyes: Conjunctivae and EOM are normal. No scleral icterus.   Neck: Symmetrical. Normal range of motion. Neck supple. No tracheal deviation present.  Cardiovascular: Normal rate, no rhythm abnormality noted. Extremities without swelling or edema, warm.    Pulmonary/Chest: Normal respiratory Effort. No distress or retractions. She exhibits no tenderness.  Abdominal: Soft. She exhibits no " distension, hernias or masses. There is no tenderness. No enlargement of liver edge or spleen.  There is no rebound and no guarding.   Genitourinary:    External rectal exam shows no thrombosed external hemorrhoids, no lesions.     Pelvic exam was performed with patient supine.   No labial fusion, and symmetrical.    There is no rash, lesion or injury on the right labia.   There is no rash, lesion or injury on the left labia.   No bleeding and no signs of injury around the vaginal introitus, urethral meatus is normal size and without prolapse or lesions, urethra well supported. The cervix is visualized with no discharge, lesions or friability.   No vaginal discharge found.    No significant Cystocele, Enterocele or rectocele, and cervix and uterus well supported.   Bimanual exam:   The urethra is normal to palpation and there are no palpable vaginal wall masses.   Uterus is not deviated, not enlarged, not fixed, normal shape and not tender.   Cervix exhibits no motion tenderness.    Right adnexum displays no mass or nodularity and no tenderness.   Left adnexum displays no mass or nodularity and no tenderness.  Musculoskeletal: Normal range of motion.   Lymphadenopathy: No inguinal adenopathy present.   Neurological: She is alert and oriented to person, place, and time. Coordination normal.   Skin: Skin is warm and dry. She is not diaphoretic. No rashes, lesions or ulcers.   Psychiatric: She has a normal mood and affect, oriented to person, place, and time.            Assessment:  Normal exam, LGSIL PAP   34 y.o.  female, reports heavier prolonged menses now,       patient presents today for dx PAP...  PAP= LGSIL 2020, neg colpo  PAP #1 2020 = WNL (new dx HIV)  PAP #2= 21= ASCUS  PAP #3= 2021= ASCUS w pos HPV  PAP#4= 21= lgsil  Liletta IUD placed 2020  PAP #5= 2022 ASCUS w neg HPV  PAP #6= 2022, ascus w neg HR-HPV  PAP #7(23)= LGSIL   colpobx= ROMINA-1  PAP #1(23)=  ASCUS  PAP #2(1/2/24)= LGSIL  PAP 5/21/2024= WNL  Repeat PAP today, plan hyst if > or = LGSIL    Plan:  Repeat PAP today, plan hyst if > or = LGSIL    Patient informed will be contacted with results within 2 weeks. Encouraged to please call back or email if she has not heard from us by then.

## 2024-10-10 RX ORDER — ALBUTEROL SULFATE 90 UG/1
2 INHALANT RESPIRATORY (INHALATION) EVERY 6 HOURS PRN
Qty: 18 G | Refills: 2 | Status: SHIPPED | OUTPATIENT
Start: 2024-10-10 | End: 2025-10-10

## 2024-10-10 RX ORDER — AMOXICILLIN AND CLAVULANATE POTASSIUM 875; 125 MG/1; MG/1
1 TABLET, FILM COATED ORAL EVERY 12 HOURS
Qty: 20 TABLET | Refills: 0 | Status: SHIPPED | OUTPATIENT
Start: 2024-10-10 | End: 2024-10-20

## 2024-10-10 RX ORDER — FLUCONAZOLE 150 MG/1
TABLET ORAL
Qty: 3 TABLET | Refills: 0 | Status: SHIPPED | OUTPATIENT
Start: 2024-10-10

## 2024-10-11 ENCOUNTER — PATIENT MESSAGE (OUTPATIENT)
Dept: OBSTETRICS AND GYNECOLOGY | Facility: CLINIC | Age: 34
End: 2024-10-11
Payer: COMMERCIAL

## 2024-10-11 ENCOUNTER — PATIENT MESSAGE (OUTPATIENT)
Dept: ADMINISTRATIVE | Facility: OTHER | Age: 34
End: 2024-10-11
Payer: COMMERCIAL

## 2024-10-17 ENCOUNTER — PATIENT MESSAGE (OUTPATIENT)
Dept: OBSTETRICS AND GYNECOLOGY | Facility: CLINIC | Age: 34
End: 2024-10-17
Payer: COMMERCIAL

## 2024-10-17 DIAGNOSIS — R87.810 PAP SMEAR OF CERVIX SHOWS HIGH RISK HPV PRESENT: ICD-10-CM

## 2024-10-17 DIAGNOSIS — Z30.2 REQUEST FOR STERILIZATION: Primary | ICD-10-CM

## 2024-10-23 ENCOUNTER — OFFICE VISIT (OUTPATIENT)
Dept: OBSTETRICS AND GYNECOLOGY | Facility: CLINIC | Age: 34
End: 2024-10-23
Payer: COMMERCIAL

## 2024-10-23 VITALS
DIASTOLIC BLOOD PRESSURE: 76 MMHG | RESPIRATION RATE: 18 BRPM | BODY MASS INDEX: 28.44 KG/M2 | WEIGHT: 181.19 LBS | SYSTOLIC BLOOD PRESSURE: 124 MMHG | HEIGHT: 67 IN

## 2024-10-23 DIAGNOSIS — N92.0 MENORRHAGIA WITH REGULAR CYCLE: Primary | ICD-10-CM

## 2024-10-23 DIAGNOSIS — N87.0 CERVICAL DYSPLASIA, MILD: ICD-10-CM

## 2024-10-23 DIAGNOSIS — N92.0 MENORRHAGIA: ICD-10-CM

## 2024-10-23 PROCEDURE — 99999 PR PBB SHADOW E&M-EST. PATIENT-LVL III: CPT | Mod: PBBFAC,,, | Performed by: OBSTETRICS & GYNECOLOGY

## 2024-10-23 PROCEDURE — 3078F DIAST BP <80 MM HG: CPT | Mod: CPTII,S$GLB,, | Performed by: OBSTETRICS & GYNECOLOGY

## 2024-10-23 PROCEDURE — 3074F SYST BP LT 130 MM HG: CPT | Mod: CPTII,S$GLB,, | Performed by: OBSTETRICS & GYNECOLOGY

## 2024-10-23 PROCEDURE — 1159F MED LIST DOCD IN RCRD: CPT | Mod: CPTII,S$GLB,, | Performed by: OBSTETRICS & GYNECOLOGY

## 2024-10-23 PROCEDURE — 3044F HG A1C LEVEL LT 7.0%: CPT | Mod: CPTII,S$GLB,, | Performed by: OBSTETRICS & GYNECOLOGY

## 2024-10-23 PROCEDURE — 3008F BODY MASS INDEX DOCD: CPT | Mod: CPTII,S$GLB,, | Performed by: OBSTETRICS & GYNECOLOGY

## 2024-10-23 PROCEDURE — 99213 OFFICE O/P EST LOW 20 MIN: CPT | Mod: 57,S$GLB,, | Performed by: OBSTETRICS & GYNECOLOGY

## 2024-10-23 RX ORDER — FAMOTIDINE 20 MG/1
20 TABLET, FILM COATED ORAL
OUTPATIENT
Start: 2024-10-23

## 2024-10-23 RX ORDER — SODIUM CHLORIDE 9 MG/ML
INJECTION, SOLUTION INTRAVENOUS CONTINUOUS
OUTPATIENT
Start: 2024-10-23

## 2024-10-23 RX ORDER — CEFAZOLIN SODIUM 2 G/50ML
2 SOLUTION INTRAVENOUS
OUTPATIENT
Start: 2024-10-23

## 2024-10-23 RX ORDER — MUPIROCIN 20 MG/G
OINTMENT TOPICAL
OUTPATIENT
Start: 2024-10-23

## 2024-10-23 NOTE — PROGRESS NOTES
ADMISSION H&P:  10/23/2024      Chief complaint: recurrent abnormal PAP      Indications for Surgery: LGSIL PAP      History of present illness:  Veronique Vick 34 y.o.   female    reports heavier prolonged menses now,       patient presents today for dx PAP...  PAP= LGSIL 2020, neg colpo  PAP #1 2020 = WNL (new dx HIV)  PAP #2= 21= ASCUS  PAP #3= 2021= ASCUS w pos HPV  PAP#4= 21= lgsil  Liletta IUD placed 2020  PAP #5= 2022 ASCUS w neg HPV  PAP #6= 2022, ascus w neg HR-HPV  PAP #7(23)= LGSIL   colpobx= ROMINA-1  PAP #1(23)= ASCUS  PAP #2(24)= LGSIL  PAP 2024= WNL  Repeat PAP today, plan hyst if > or = LGSIL   Cousneled on Risks, Benefits and Alternatives to Total laparoscopic Hysterectomy , discused with patient in detail, all questions answered and patient agreed to proceed.         Allergies:   Review of patient's allergies indicates:   Allergen Reactions    Eggs [egg derived] Nausea Only     Cannot take egg derived flu vaccine    Ondansetron Dermatitis    Sulfa (sulfonamide antibiotics) Nausea Only    Sulfamethoxazole-trimethoprim Diarrhea       Past Medical History:   Diagnosis Date    Closed fracture of distal end of left radius with routine healing 3/31/2016    Wrist fracture     left       Past Surgical History:   Procedure Laterality Date    colposcopy      multiple    ORIF RADIUS & ULNA FRACTURES Left 2016    WISDOM TOOTH EXTRACTION         MEDS:   Current Outpatient Medications on File Prior to Visit   Medication Sig Dispense Refill    albuterol (VENTOLIN HFA) 90 mcg/actuation inhaler Inhale 2 puffs into the lungs every 6 (six) hours as needed for Wheezing. Rescue 18 g 2    atogepant (QULIPTA) 60 mg Tab Take 1 tablet (60 mg) by mouth once daily. 90 tablet 1    knloeulcs-bxqywuty-cpzovza ala (BIKTARVY) -25 mg (25 kg or greater) Take 1 tablet by mouth once daily. 30 tablet 11    buPROPion (WELLBUTRIN XL) 150 MG TB24 tablet  Take 1 tablet (150 mg total) by mouth once daily. 30 tablet 11    [START ON 10/26/2024] chlorhexidine (PERIDEX) 0.12 % solution Use as directed 10 mLs in the mouth or throat 2 (two) times daily.      ergocalciferol (ERGOCALCIFEROL) 50,000 unit Cap Take 1 capsule (50,000 Units total) by mouth every 7 days. 12 capsule 3    fluconazole (DIFLUCAN) 150 MG Tab Take diflucan tab (150mg) every 72 hours for a total of 3 doses. 3 tablet 0    [START ON 10/26/2024] mupirocin (BACTROBAN) 2 % ointment 1 g by Nasal route 2 (two) times daily.      promethazine (PHENERGAN) 12.5 MG Tab Take 12.5 mg by mouth every 4 (four) hours as needed.      rizatriptan (MAXALT-MLT) 5 MG disintegrating tablet Dissolve 1 tablet in mouth at onset of headache; dissolve another tablet in mouth two hours later if needed; DO NOT USE NO MORE THAN 2 TABLETS PER DAY OR 3 DAYS PER WEEK. 10 tablet 5    sertraline (ZOLOFT) 50 MG tablet Take 1 tablet (50 mg total) by mouth once daily. 90 tablet 3     No current facility-administered medications on file prior to visit.       OB History          2    Para   1    Term   1            AB   1    Living   1         SAB   1    IAB        Ectopic        Multiple        Live Births   1                 Social History     Socioeconomic History    Marital status: Single   Tobacco Use    Smoking status: Never    Smokeless tobacco: Never   Substance and Sexual Activity    Alcohol use: Yes     Alcohol/week: 0.0 standard drinks of alcohol     Comment: social    Drug use: No    Sexual activity: Yes     Partners: Male     Birth control/protection: I.U.D.     Social Drivers of Health     Financial Resource Strain: Low Risk  (2023)    Overall Financial Resource Strain (CARDIA)     Difficulty of Paying Living Expenses: Not hard at all   Food Insecurity: No Food Insecurity (2023)    Hunger Vital Sign     Worried About Running Out of Food in the Last Year: Never true     Ran Out of Food in the Last Year:  "Never true   Transportation Needs: No Transportation Needs (11/21/2023)    PRAPARE - Transportation     Lack of Transportation (Medical): No     Lack of Transportation (Non-Medical): No   Physical Activity: Insufficiently Active (11/21/2023)    Exercise Vital Sign     Days of Exercise per Week: 3 days     Minutes of Exercise per Session: 30 min   Stress: Stress Concern Present (11/21/2023)    [employer] [place] [employer] - Occupational Stress Questionnaire     Feeling of Stress : Rather much   Housing Stability: Low Risk  (11/21/2023)    Housing Stability Vital Sign     Unable to Pay for Housing in the Last Year: No     Number of Places Lived in the Last Year: 1     Unstable Housing in the Last Year: No       Family History   Problem Relation Name Age of Onset    Migraines Mother      Hyperlipidemia Father      Hypertension Father      Migraines Sister           Review of Systems - Negative except HPI  GEN ROS: negative for - chills or fever  Breast ROS: negative for breast lumps  Genito-Urinary ROS: no dysuria, trouble voiding, or hematuria        Physical Examination:  /82 (Patient Position: Sitting)   Ht 5' 7" (1.702 m)   Wt 82.6 kg (182 lb 1.6 oz)   LMP 09/10/2024 (Approximate)   BMI 28.52 kg/m²    Constitutional: She appears alert and responsive. She appears well-developed, well-groomed, and well-nourished. No distress.  HENT:   Head: Normocephalic and atraumatic.   Eyes: Conjunctivae and EOM are normal. No scleral icterus.   Neck: Symmetrical. Normal range of motion. Neck supple. No tracheal deviation present.  Cardiovascular: Normal rate, no rhythm abnormality noted. Extremities without swelling or edema, warm.    Pulmonary/Chest: Normal respiratory Effort. No distress or retractions. She exhibits no tenderness.  Abdominal: Soft. She exhibits no distension, hernias or masses. There is no tenderness. No enlargement of liver edge or spleen.  There is no rebound and no guarding. "   Genitourinary:    External rectal exam shows no thrombosed external hemorrhoids, no lesions.     Pelvic exam was performed with patient supine.   No labial fusion, and symmetrical.    There is no rash, lesion or injury on the right labia.   There is no rash, lesion or injury on the left labia.   No bleeding and no signs of injury around the vaginal introitus, urethral meatus is normal size and without prolapse or lesions, urethra well supported. The cervix is visualized with no discharge, lesions or friability.   No vaginal discharge found.    No significant Cystocele, Enterocele or rectocele, and cervix and uterus well supported.   Bimanual exam:   The urethra is normal to palpation and there are no palpable vaginal wall masses.   Uterus is not deviated, not enlarged, not fixed, normal shape and not tender.   Cervix exhibits no motion tenderness.    Right adnexum displays no mass or nodularity and no tenderness.   Left adnexum displays no mass or nodularity and no tenderness.  Musculoskeletal: Normal range of motion.   Lymphadenopathy: No inguinal adenopathy present.   Neurological: She is alert and oriented to person, place, and time. Coordination normal.   Skin: Skin is warm and dry. She is not diaphoretic. No rashes, lesions or ulcers.   Psychiatric: She has a normal mood and affect, oriented to person, place, and time.                 Assessment:  Normal exam, LGSIL PAP   34 y.o.  female, reports heavier prolonged menses now,       patient presents today for dx PAP...  PAP= LGSIL 2020, neg colpo  PAP #1 2020 = WNL (new dx HIV)  PAP #2= 21= ASCUS  PAP #3= 2021= ASCUS w pos HPV  PAP#4= 21= lgsil  Liletta IUD placed 2020  PAP #5= 2022 ASCUS w neg HPV  PAP #6= 2022, ascus w neg HR-HPV  PAP #7(23)= LGSIL   colpobx= ROMINA-1  PAP #1(23)= ASCUS  PAP #2(24)= LGSIL  PAP 2024= WNL  Repeat PAP= LGSIL    Plan:  Total laparoscopic Hysterectomy for definive cure  abnormal PAP        Yevgeniy Fields M.D., FACOG

## 2024-10-28 PROBLEM — N92.0 MENORRHAGIA: Status: ACTIVE | Noted: 2024-10-28

## 2024-10-28 PROBLEM — R87.810 PAP SMEAR OF CERVIX SHOWS HIGH RISK HPV PRESENT: Status: ACTIVE | Noted: 2024-10-28

## 2024-10-28 PROBLEM — Z30.2 REQUEST FOR STERILIZATION: Status: ACTIVE | Noted: 2024-10-28

## 2024-10-29 ENCOUNTER — PATIENT MESSAGE (OUTPATIENT)
Dept: OBSTETRICS AND GYNECOLOGY | Facility: CLINIC | Age: 34
End: 2024-10-29
Payer: COMMERCIAL

## 2024-10-31 ENCOUNTER — PATIENT MESSAGE (OUTPATIENT)
Dept: OBSTETRICS AND GYNECOLOGY | Facility: CLINIC | Age: 34
End: 2024-10-31
Payer: COMMERCIAL

## 2024-11-02 ENCOUNTER — NURSE TRIAGE (OUTPATIENT)
Dept: ADMINISTRATIVE | Facility: CLINIC | Age: 34
End: 2024-11-02
Payer: COMMERCIAL

## 2024-11-04 ENCOUNTER — LAB VISIT (OUTPATIENT)
Dept: LAB | Facility: HOSPITAL | Age: 34
End: 2024-11-04
Attending: OBSTETRICS & GYNECOLOGY
Payer: COMMERCIAL

## 2024-11-04 DIAGNOSIS — R39.9 UTI SYMPTOMS: Primary | ICD-10-CM

## 2024-11-04 DIAGNOSIS — R39.9 UTI SYMPTOMS: ICD-10-CM

## 2024-11-04 PROCEDURE — 87086 URINE CULTURE/COLONY COUNT: CPT | Performed by: OBSTETRICS & GYNECOLOGY

## 2024-11-05 ENCOUNTER — PATIENT MESSAGE (OUTPATIENT)
Dept: OBSTETRICS AND GYNECOLOGY | Facility: CLINIC | Age: 34
End: 2024-11-05
Payer: COMMERCIAL

## 2024-11-07 LAB — BACTERIA UR CULT: NO GROWTH

## 2024-11-11 ENCOUNTER — PATIENT MESSAGE (OUTPATIENT)
Dept: ADMINISTRATIVE | Facility: OTHER | Age: 34
End: 2024-11-11
Payer: COMMERCIAL

## 2024-11-11 ENCOUNTER — OFFICE VISIT (OUTPATIENT)
Dept: OBSTETRICS AND GYNECOLOGY | Facility: CLINIC | Age: 34
End: 2024-11-11
Payer: COMMERCIAL

## 2024-11-11 VITALS
BODY MASS INDEX: 28.09 KG/M2 | RESPIRATION RATE: 18 BRPM | WEIGHT: 174.81 LBS | DIASTOLIC BLOOD PRESSURE: 74 MMHG | HEIGHT: 66 IN | SYSTOLIC BLOOD PRESSURE: 112 MMHG

## 2024-11-11 DIAGNOSIS — N76.0 BV (BACTERIAL VAGINOSIS): Primary | ICD-10-CM

## 2024-11-11 DIAGNOSIS — B96.89 BV (BACTERIAL VAGINOSIS): Primary | ICD-10-CM

## 2024-11-11 PROCEDURE — 1159F MED LIST DOCD IN RCRD: CPT | Mod: CPTII,S$GLB,, | Performed by: OBSTETRICS & GYNECOLOGY

## 2024-11-11 PROCEDURE — 99999 PR PBB SHADOW E&M-EST. PATIENT-LVL IV: CPT | Mod: PBBFAC,,, | Performed by: OBSTETRICS & GYNECOLOGY

## 2024-11-11 PROCEDURE — 99024 POSTOP FOLLOW-UP VISIT: CPT | Mod: S$GLB,,, | Performed by: OBSTETRICS & GYNECOLOGY

## 2024-11-11 PROCEDURE — 3078F DIAST BP <80 MM HG: CPT | Mod: CPTII,S$GLB,, | Performed by: OBSTETRICS & GYNECOLOGY

## 2024-11-11 PROCEDURE — 3074F SYST BP LT 130 MM HG: CPT | Mod: CPTII,S$GLB,, | Performed by: OBSTETRICS & GYNECOLOGY

## 2024-11-11 PROCEDURE — 3044F HG A1C LEVEL LT 7.0%: CPT | Mod: CPTII,S$GLB,, | Performed by: OBSTETRICS & GYNECOLOGY

## 2024-11-11 RX ORDER — METRONIDAZOLE 500 MG/1
500 TABLET ORAL EVERY 12 HOURS
Qty: 14 TABLET | Refills: 1 | Status: SHIPPED | OUTPATIENT
Start: 2024-11-11 | End: 2024-11-18

## 2024-12-09 ENCOUNTER — OFFICE VISIT (OUTPATIENT)
Dept: OBSTETRICS AND GYNECOLOGY | Facility: CLINIC | Age: 34
End: 2024-12-09
Payer: COMMERCIAL

## 2024-12-09 VITALS — HEIGHT: 66 IN | BODY MASS INDEX: 27.99 KG/M2 | WEIGHT: 174.19 LBS

## 2024-12-09 DIAGNOSIS — Z09 POSTOP CHECK: Primary | ICD-10-CM

## 2024-12-09 PROCEDURE — 99024 POSTOP FOLLOW-UP VISIT: CPT | Mod: S$GLB,,, | Performed by: OBSTETRICS & GYNECOLOGY

## 2024-12-09 PROCEDURE — 1159F MED LIST DOCD IN RCRD: CPT | Mod: CPTII,S$GLB,, | Performed by: OBSTETRICS & GYNECOLOGY

## 2024-12-09 PROCEDURE — 3044F HG A1C LEVEL LT 7.0%: CPT | Mod: CPTII,S$GLB,, | Performed by: OBSTETRICS & GYNECOLOGY

## 2024-12-09 PROCEDURE — 99999 PR PBB SHADOW E&M-EST. PATIENT-LVL III: CPT | Mod: PBBFAC,,, | Performed by: OBSTETRICS & GYNECOLOGY

## 2024-12-09 NOTE — PROGRESS NOTES
History of Present Illness:   Pateint presents today  6 weeks postop, status post Total laparoscopic Hysterectomy , with complaint of spotting on and off, counseled .  PAP= LGSIL 2020, neg colpo  PAP #1 2020 = WNL (new dx HIV)  PAP #2= 21= ASCUS  PAP #3= 2021= ASCUS w pos HPV  PAP#4= 21= lgsil  Liletta IUD placed 2020  PAP #5= 2022 ASCUS w neg HPV  PAP #6= 2022, ascus w neg HR-HPV  PAP #7(23)= LGSIL   colpobx= ROMINA-1  PAP #1(23)= ASCUS  PAP 2024= LGSIL  PAP 2024= WNL      Pathology:   DIAGNOSIS:   UTERUS and amp; TUBES, HYSTERECTOMY WITH BILATERAL SALPINGECTOMY:   - UNREMARKABLE CERVICAL SQUAMOUS MUCOSA, NO DYSPLASIA SEEN.   - ACUTE AND CHRONIC ENDOCERVICITIS WITH NABOTHIAN CYSTS AND REACTIVE    SQUAMOUS METAPLASIA.   - EARLY SECRETORY ENDOMETRIUM.   - UNREMARKABLE MYOMETRIUM.   - MARKED UTERINE SEROSAL ADHESIONS.   - UNREMARKABLE FIMBRIATED FALLOPIAN TUBES.     Past medical and surgical history reviewed.   I have reviewed the patient's medical history in detail and updated the computerized patient record.      OB History          2    Para   1    Term   1            AB   1    Living   1         SAB   1    IAB        Ectopic        Multiple        Live Births   1                 Past Medical History:   Diagnosis Date    Closed fracture of distal end of left radius with routine healing 2016    Depression     Migraines     Pap smear of cervix shows high risk HPV present     Wrist fracture     left       Past Surgical History:   Procedure Laterality Date    colposcopy      multiple    HYSTERECTOMY      LAPAROSCOPIC TOTAL HYSTERECTOMY N/A 10/28/2024    Procedure: HYSTERECTOMY, TOTAL, LAPAROSCOPIC;  Surgeon: Yevgeniy Fields MD;  Location: [facility];  Service: OB/GYN;  Laterality: N/A;  uterus , cervix and bilaterlal fallopian tubes removed    ORIF RADIUS & ULNA FRACTURES Left     with hardware    WISDOM TOOTH EXTRACTION         Current Outpatient  Medications on File Prior to Visit   Medication Sig Dispense Refill    albuterol (VENTOLIN HFA) 90 mcg/actuation inhaler Inhale 2 puffs into the lungs every 6 (six) hours as needed for Wheezing. Rescue 18 g 2    atogepant (QULIPTA) 60 mg Tab Take 1 tablet (60 mg) by mouth once daily. 90 tablet 1    xyoiajywi-fnsxoyhg-imyltjh ala (BIKTARVY) -25 mg (25 kg or greater) Take 1 tablet by mouth once daily. 30 tablet 11    buPROPion (WELLBUTRIN XL) 150 MG TB24 tablet Take 1 tablet (150 mg total) by mouth once daily. 30 tablet 11    ergocalciferol (ERGOCALCIFEROL) 50,000 unit Cap Take 1 capsule (50,000 Units total) by mouth every 7 days. 12 capsule 3    ibuprofen (ADVIL,MOTRIN) 800 MG tablet Take 1 tablet (800 mg total) by mouth every 8 (eight) hours as needed for Pain. 30 tablet 0    promethazine (PHENERGAN) 12.5 MG Tab Take 12.5 mg by mouth every 4 (four) hours as needed.      rizatriptan (MAXALT-MLT) 5 MG disintegrating tablet Dissolve 1 tablet in mouth at onset of headache; dissolve another tablet in mouth two hours later if needed; DO NOT USE NO MORE THAN 2 TABLETS PER DAY OR 3 DAYS PER WEEK. 10 tablet 5    sertraline (ZOLOFT) 50 MG tablet Take 1 tablet (50 mg total) by mouth once daily. 90 tablet 3    oxyCODONE-acetaminophen (PERCOCET) 5-325 mg per tablet Take 1 tablet by mouth every 4 (four) hours as needed for Pain. 30 tablet 0    oxyCODONE-acetaminophen (PERCOCET) 5-325 mg per tablet Take 1 tablet by mouth every 4 (four) hours as needed for Pain. 30 tablet 0    polyethylene glycol (GLYCOLAX) 17 gram/dose powder Take 17 g by mouth once daily. In 8 oz of water 240 g 1     No current facility-administered medications on file prior to visit.       Review of patient's allergies indicates:   Allergen Reactions    Eggs [egg derived] Nausea Only     Cannot take egg derived flu vaccine    Ondansetron Dermatitis    Sulfa (sulfonamide antibiotics) Nausea Only    Sulfamethoxazole-trimethoprim Diarrhea       Social History  "    Socioeconomic History    Marital status: Single   Tobacco Use    Smoking status: Never     Passive exposure: Never    Smokeless tobacco: Never   Substance and Sexual Activity    Alcohol use: Yes     Alcohol/week: 0.0 standard drinks of alcohol     Comment: social    Drug use: No    Sexual activity: Yes     Partners: Male     Birth control/protection: I.U.D.     Social Drivers of Health     Financial Resource Strain: Low Risk  (11/21/2023)    Overall Financial Resource Strain (CARDIA)     Difficulty of Paying Living Expenses: Not hard at all   Food Insecurity: No Food Insecurity (11/21/2023)    Hunger Vital Sign     Worried About Running Out of Food in the Last Year: Never true     Ran Out of Food in the Last Year: Never true   Transportation Needs: No Transportation Needs (11/21/2023)    PRAPARE - Transportation     Lack of Transportation (Medical): No     Lack of Transportation (Non-Medical): No   Physical Activity: Insufficiently Active (11/21/2023)    Exercise Vital Sign     Days of Exercise per Week: 3 days     Minutes of Exercise per Session: 30 min   Stress: Stress Concern Present (11/21/2023)    [employer] - Occupational Stress Questionnaire     Feeling of Stress : Rather much   Housing Stability: Low Risk  (11/21/2023)    Housing Stability Vital Sign     Unable to Pay for Housing in the Last Year: No     Number of Places Lived in the Last Year: 1     Unstable Housing in the Last Year: No       Family History   Problem Relation Name Age of Onset    Migraines Mother      Hyperlipidemia Father      Hypertension Father      Migraines Sister         Physical exam:  Ht 5' 6" (1.676 m)   Wt 79 kg (174 lb 2.6 oz)   LMP 10/06/2024 (Approximate)   BMI 28.11 kg/m²   Constitutional: She is oriented to person, place, and time. She appears well-developed and well-nourished. No distress.   HENT:   Head: Normocephalic and atraumatic.   Eyes: Conjunctivae and EOM are normal. No scleral " icterus.   Neck: Normal range of motion. Neck supple. No tracheal deviation present.   Cardiovascular: Normal rate.    Pulmonary/Chest: Effort normal. No respiratory distress. She exhibits no tenderness.  Breasts: deferred  Abdominal: Soft. She exhibits no distension and no mass.  The incisions are healed well. There is no rebound and no guarding.   Genitourinary:    External rectal exam shows no thrombosed external hemorrhoids.    Pelvic exam was performed with patient supine.   No labial fusion.   There is no rash, lesion or injury on the right labia.   There is no rash, lesion or injury on the left labia.   No bleeding and no signs of injury around the vaginal introitus, urethra is without lesions and well supported.    No vaginal discharge found. Cuff healing well and intact, but not fully healed.   No significant Cystocele, Enterocele or rectocele, and cuff well supported.   Bimanual exam:   The urethra and vaginal are without palpable masses or tenderness.   Uterus and cervix are surgically absents, vaginal cuff is intact and well supported.   Right adnexum displays no mass and no tenderness.   Left adnexum displays no mass and no tenderness.  Musculoskeletal: Normal range of motion.   Neurological: She is alert and oriented to person, place, and time. Coordination normal.   Skin: Skin is warm and dry. She is not diaphoretic.   Psychiatric: She has a normal mood and affect.      Assessment:  6 weeks ppostop - delayed healing    Plan:  Follow up  4 months- repeat PAP, if normal no follow up PAP needed  Resume normal activity - in 2 more weeks

## 2024-12-12 ENCOUNTER — PATIENT MESSAGE (OUTPATIENT)
Dept: ADMINISTRATIVE | Facility: OTHER | Age: 34
End: 2024-12-12
Payer: COMMERCIAL

## 2024-12-13 ENCOUNTER — ANESTHESIA EVENT (OUTPATIENT)
Dept: SURGERY | Facility: HOSPITAL | Age: 34
End: 2024-12-13
Payer: COMMERCIAL

## 2024-12-13 ENCOUNTER — OFFICE VISIT (OUTPATIENT)
Dept: OBSTETRICS AND GYNECOLOGY | Facility: CLINIC | Age: 34
End: 2024-12-13
Payer: COMMERCIAL

## 2024-12-13 ENCOUNTER — ANESTHESIA (OUTPATIENT)
Dept: SURGERY | Facility: HOSPITAL | Age: 34
End: 2024-12-13
Payer: COMMERCIAL

## 2024-12-13 ENCOUNTER — HOSPITAL ENCOUNTER (OUTPATIENT)
Facility: HOSPITAL | Age: 34
LOS: 1 days | Discharge: HOME | End: 2024-12-14
Attending: OBSTETRICS & GYNECOLOGY | Admitting: OBSTETRICS & GYNECOLOGY
Payer: COMMERCIAL

## 2024-12-13 DIAGNOSIS — T81.328A DEHISCENCE OF VAGINAL CUFF, INITIAL ENCOUNTER: Primary | ICD-10-CM

## 2024-12-13 LAB
ALBUMIN SERPL BCP-MCNC: 4.1 G/DL (ref 3.5–5.2)
ALP SERPL-CCNC: 53 U/L (ref 40–150)
ALT SERPL W/O P-5'-P-CCNC: 14 U/L (ref 10–44)
ANION GAP SERPL CALC-SCNC: 9 MMOL/L (ref 8–16)
AST SERPL-CCNC: 15 U/L (ref 10–40)
BASOPHILS # BLD AUTO: 0.02 K/UL (ref 0–0.2)
BASOPHILS NFR BLD: 0.2 % (ref 0–1.9)
BILIRUB SERPL-MCNC: 0.6 MG/DL (ref 0.1–1)
BUN SERPL-MCNC: 10 MG/DL (ref 6–20)
CALCIUM SERPL-MCNC: 9.2 MG/DL (ref 8.7–10.5)
CHLORIDE SERPL-SCNC: 107 MMOL/L (ref 95–110)
CO2 SERPL-SCNC: 22 MMOL/L (ref 23–29)
CREAT SERPL-MCNC: 0.8 MG/DL (ref 0.5–1.4)
DIFFERENTIAL METHOD BLD: ABNORMAL
EOSINOPHIL # BLD AUTO: 0 K/UL (ref 0–0.5)
EOSINOPHIL NFR BLD: 0.4 % (ref 0–8)
ERYTHROCYTE [DISTWIDTH] IN BLOOD BY AUTOMATED COUNT: 13.1 % (ref 11.5–14.5)
EST. GFR  (NO RACE VARIABLE): >60 ML/MIN/1.73 M^2
GLUCOSE SERPL-MCNC: 101 MG/DL (ref 70–110)
HCT VFR BLD AUTO: 38 % (ref 37–48.5)
HGB BLD-MCNC: 12.9 G/DL (ref 12–16)
IMM GRANULOCYTES # BLD AUTO: 0.01 K/UL (ref 0–0.04)
IMM GRANULOCYTES NFR BLD AUTO: 0.1 % (ref 0–0.5)
LYMPHOCYTES # BLD AUTO: 1.2 K/UL (ref 1–4.8)
LYMPHOCYTES NFR BLD: 13.6 % (ref 18–48)
MCH RBC QN AUTO: 31.2 PG (ref 27–31)
MCHC RBC AUTO-ENTMCNC: 33.9 G/DL (ref 32–36)
MCV RBC AUTO: 92 FL (ref 82–98)
MONOCYTES # BLD AUTO: 0.4 K/UL (ref 0.3–1)
MONOCYTES NFR BLD: 4.3 % (ref 4–15)
NEUTROPHILS # BLD AUTO: 7.3 K/UL (ref 1.8–7.7)
NEUTROPHILS NFR BLD: 81.4 % (ref 38–73)
NRBC BLD-RTO: 0 /100 WBC
PLATELET # BLD AUTO: 151 K/UL (ref 150–450)
PMV BLD AUTO: 11.1 FL (ref 9.2–12.9)
POTASSIUM SERPL-SCNC: 4.2 MMOL/L (ref 3.5–5.1)
PROT SERPL-MCNC: 6.5 G/DL (ref 6–8.4)
RBC # BLD AUTO: 4.13 M/UL (ref 4–5.4)
SODIUM SERPL-SCNC: 138 MMOL/L (ref 136–145)
WBC # BLD AUTO: 8.99 K/UL (ref 3.9–12.7)

## 2024-12-13 PROCEDURE — 63600175 PHARM REV CODE 636 W HCPCS: Performed by: OBSTETRICS & GYNECOLOGY

## 2024-12-13 PROCEDURE — 80053 COMPREHEN METABOLIC PANEL: CPT | Performed by: GENERAL PRACTICE

## 2024-12-13 PROCEDURE — 63600175 PHARM REV CODE 636 W HCPCS: Mod: JZ,JG | Performed by: OBSTETRICS & GYNECOLOGY

## 2024-12-13 PROCEDURE — 25000003 PHARM REV CODE 250: Performed by: NURSE ANESTHETIST, CERTIFIED REGISTERED

## 2024-12-13 PROCEDURE — 63600175 PHARM REV CODE 636 W HCPCS: Performed by: GENERAL PRACTICE

## 2024-12-13 PROCEDURE — 37000009 HC ANESTHESIA EA ADD 15 MINS: Performed by: OBSTETRICS & GYNECOLOGY

## 2024-12-13 PROCEDURE — 85025 COMPLETE CBC W/AUTO DIFF WBC: CPT | Performed by: GENERAL PRACTICE

## 2024-12-13 PROCEDURE — 99999 PR PBB SHADOW E&M-EST. PATIENT-LVL II: CPT | Mod: PBBFAC,,, | Performed by: GENERAL PRACTICE

## 2024-12-13 PROCEDURE — 36415 COLL VENOUS BLD VENIPUNCTURE: CPT | Performed by: GENERAL PRACTICE

## 2024-12-13 PROCEDURE — 25000003 PHARM REV CODE 250: Performed by: ANESTHESIOLOGY

## 2024-12-13 PROCEDURE — 96361 HYDRATE IV INFUSION ADD-ON: CPT

## 2024-12-13 PROCEDURE — 44180 LAP ENTEROLYSIS: CPT | Mod: ,,, | Performed by: SURGERY

## 2024-12-13 PROCEDURE — 25000003 PHARM REV CODE 250: Performed by: OBSTETRICS & GYNECOLOGY

## 2024-12-13 PROCEDURE — 63600175 PHARM REV CODE 636 W HCPCS: Performed by: NURSE ANESTHETIST, CERTIFIED REGISTERED

## 2024-12-13 PROCEDURE — 36000708 HC OR TIME LEV III 1ST 15 MIN: Performed by: OBSTETRICS & GYNECOLOGY

## 2024-12-13 PROCEDURE — 27201423 OPTIME MED/SURG SUP & DEVICES STERILE SUPPLY: Performed by: OBSTETRICS & GYNECOLOGY

## 2024-12-13 PROCEDURE — 63600175 PHARM REV CODE 636 W HCPCS: Mod: JZ,JG | Performed by: GENERAL PRACTICE

## 2024-12-13 PROCEDURE — G0378 HOSPITAL OBSERVATION PER HR: HCPCS

## 2024-12-13 PROCEDURE — 25000003 PHARM REV CODE 250: Performed by: GENERAL PRACTICE

## 2024-12-13 PROCEDURE — 96367 TX/PROPH/DG ADDL SEQ IV INF: CPT

## 2024-12-13 PROCEDURE — 94799 UNLISTED PULMONARY SVC/PX: CPT

## 2024-12-13 PROCEDURE — 58999 UNLISTED PX FML GENITAL SYS: CPT | Mod: ,,, | Performed by: OBSTETRICS & GYNECOLOGY

## 2024-12-13 PROCEDURE — 71000033 HC RECOVERY, INTIAL HOUR: Performed by: OBSTETRICS & GYNECOLOGY

## 2024-12-13 PROCEDURE — 96360 HYDRATION IV INFUSION INIT: CPT | Mod: 59

## 2024-12-13 PROCEDURE — 96365 THER/PROPH/DIAG IV INF INIT: CPT

## 2024-12-13 PROCEDURE — 99900035 HC TECH TIME PER 15 MIN (STAT)

## 2024-12-13 PROCEDURE — 36000709 HC OR TIME LEV III EA ADD 15 MIN: Performed by: OBSTETRICS & GYNECOLOGY

## 2024-12-13 PROCEDURE — 37000008 HC ANESTHESIA 1ST 15 MINUTES: Performed by: OBSTETRICS & GYNECOLOGY

## 2024-12-13 RX ORDER — SODIUM CHLORIDE 9 MG/ML
INJECTION, SOLUTION INTRAVENOUS CONTINUOUS
Status: CANCELLED | OUTPATIENT
Start: 2024-12-13

## 2024-12-13 RX ORDER — DIPHENHYDRAMINE HYDROCHLORIDE 50 MG/ML
25 INJECTION INTRAMUSCULAR; INTRAVENOUS EVERY 6 HOURS PRN
Status: DISCONTINUED | OUTPATIENT
Start: 2024-12-13 | End: 2024-12-13 | Stop reason: HOSPADM

## 2024-12-13 RX ORDER — FENTANYL CITRATE 50 UG/ML
25 INJECTION, SOLUTION INTRAMUSCULAR; INTRAVENOUS EVERY 5 MIN PRN
Status: DISCONTINUED | OUTPATIENT
Start: 2024-12-13 | End: 2024-12-13 | Stop reason: HOSPADM

## 2024-12-13 RX ORDER — SODIUM CHLORIDE 9 MG/ML
INJECTION, SOLUTION INTRAVENOUS CONTINUOUS
Status: DISCONTINUED | OUTPATIENT
Start: 2024-12-13 | End: 2024-12-13

## 2024-12-13 RX ORDER — ALBUTEROL SULFATE 90 UG/1
2 INHALANT RESPIRATORY (INHALATION) EVERY 6 HOURS PRN
Status: DISCONTINUED | OUTPATIENT
Start: 2024-12-13 | End: 2024-12-13 | Stop reason: SDUPTHER

## 2024-12-13 RX ORDER — ACETAMINOPHEN 10 MG/ML
INJECTION, SOLUTION INTRAVENOUS
Status: DISCONTINUED | OUTPATIENT
Start: 2024-12-13 | End: 2024-12-13

## 2024-12-13 RX ORDER — LIDOCAINE HYDROCHLORIDE 20 MG/ML
INJECTION INTRAVENOUS
Status: DISCONTINUED | OUTPATIENT
Start: 2024-12-13 | End: 2024-12-13

## 2024-12-13 RX ORDER — KETOROLAC TROMETHAMINE 30 MG/ML
INJECTION, SOLUTION INTRAMUSCULAR; INTRAVENOUS
Status: DISCONTINUED | OUTPATIENT
Start: 2024-12-13 | End: 2024-12-13

## 2024-12-13 RX ORDER — ALBUTEROL SULFATE 2.5 MG/.5ML
2.5 SOLUTION RESPIRATORY (INHALATION) EVERY 6 HOURS PRN
Status: DISCONTINUED | OUTPATIENT
Start: 2024-12-13 | End: 2024-12-14 | Stop reason: HOSPADM

## 2024-12-13 RX ORDER — OXYCODONE HYDROCHLORIDE 5 MG/1
5 TABLET ORAL EVERY 4 HOURS PRN
Status: DISCONTINUED | OUTPATIENT
Start: 2024-12-13 | End: 2024-12-14 | Stop reason: HOSPADM

## 2024-12-13 RX ORDER — SCOLOPAMINE TRANSDERMAL SYSTEM 1 MG/1
1 PATCH, EXTENDED RELEASE TRANSDERMAL
Status: DISCONTINUED | OUTPATIENT
Start: 2024-12-13 | End: 2024-12-14 | Stop reason: HOSPADM

## 2024-12-13 RX ORDER — BUPROPION HYDROCHLORIDE 150 MG/1
150 TABLET ORAL DAILY
Status: DISCONTINUED | OUTPATIENT
Start: 2024-12-14 | End: 2024-12-14 | Stop reason: HOSPADM

## 2024-12-13 RX ORDER — CLINDAMYCIN PHOSPHATE 900 MG/50ML
900 INJECTION, SOLUTION INTRAVENOUS
Status: DISCONTINUED | OUTPATIENT
Start: 2024-12-13 | End: 2024-12-14 | Stop reason: HOSPADM

## 2024-12-13 RX ORDER — DIPHENHYDRAMINE HCL 25 MG
25 CAPSULE ORAL EVERY 6 HOURS PRN
Status: DISCONTINUED | OUTPATIENT
Start: 2024-12-13 | End: 2024-12-14 | Stop reason: HOSPADM

## 2024-12-13 RX ORDER — PROCHLORPERAZINE EDISYLATE 5 MG/ML
5 INJECTION INTRAMUSCULAR; INTRAVENOUS EVERY 4 HOURS PRN
Status: DISCONTINUED | OUTPATIENT
Start: 2024-12-13 | End: 2024-12-13 | Stop reason: HOSPADM

## 2024-12-13 RX ORDER — SERTRALINE HYDROCHLORIDE 50 MG/1
50 TABLET, FILM COATED ORAL DAILY
Status: DISCONTINUED | OUTPATIENT
Start: 2024-12-14 | End: 2024-12-14 | Stop reason: HOSPADM

## 2024-12-13 RX ORDER — FENTANYL CITRATE 50 UG/ML
INJECTION, SOLUTION INTRAMUSCULAR; INTRAVENOUS
Status: DISCONTINUED | OUTPATIENT
Start: 2024-12-13 | End: 2024-12-13

## 2024-12-13 RX ORDER — MEPERIDINE HYDROCHLORIDE 50 MG/ML
12.5 INJECTION INTRAMUSCULAR; INTRAVENOUS; SUBCUTANEOUS ONCE
Status: DISCONTINUED | OUTPATIENT
Start: 2024-12-13 | End: 2024-12-13 | Stop reason: HOSPADM

## 2024-12-13 RX ORDER — ACETAMINOPHEN 325 MG/1
650 TABLET ORAL EVERY 4 HOURS PRN
Status: DISCONTINUED | OUTPATIENT
Start: 2024-12-13 | End: 2024-12-14 | Stop reason: HOSPADM

## 2024-12-13 RX ORDER — PROMETHAZINE HYDROCHLORIDE 25 MG/1
25 SUPPOSITORY RECTAL EVERY 6 HOURS PRN
Status: DISCONTINUED | OUTPATIENT
Start: 2024-12-13 | End: 2024-12-14 | Stop reason: HOSPADM

## 2024-12-13 RX ORDER — HYDROMORPHONE HYDROCHLORIDE 1 MG/ML
1 INJECTION, SOLUTION INTRAMUSCULAR; INTRAVENOUS; SUBCUTANEOUS EVERY 4 HOURS PRN
Status: DISCONTINUED | OUTPATIENT
Start: 2024-12-13 | End: 2024-12-14 | Stop reason: HOSPADM

## 2024-12-13 RX ORDER — CLINDAMYCIN PHOSPHATE 900 MG/50ML
900 INJECTION, SOLUTION INTRAVENOUS
Status: CANCELLED | OUTPATIENT
Start: 2024-12-13

## 2024-12-13 RX ORDER — DEXTROSE, SODIUM CHLORIDE, SODIUM LACTATE, POTASSIUM CHLORIDE, AND CALCIUM CHLORIDE 5; .6; .31; .03; .02 G/100ML; G/100ML; G/100ML; G/100ML; G/100ML
INJECTION, SOLUTION INTRAVENOUS CONTINUOUS
Status: DISCONTINUED | OUTPATIENT
Start: 2024-12-13 | End: 2024-12-14 | Stop reason: HOSPADM

## 2024-12-13 RX ORDER — PROPOFOL 10 MG/ML
VIAL (ML) INTRAVENOUS
Status: DISCONTINUED | OUTPATIENT
Start: 2024-12-13 | End: 2024-12-13

## 2024-12-13 RX ORDER — OXYCODONE HYDROCHLORIDE 5 MG/1
5 TABLET ORAL
Status: DISCONTINUED | OUTPATIENT
Start: 2024-12-13 | End: 2024-12-13 | Stop reason: HOSPADM

## 2024-12-13 RX ORDER — SUCCINYLCHOLINE CHLORIDE 20 MG/ML
INJECTION INTRAMUSCULAR; INTRAVENOUS
Status: DISCONTINUED | OUTPATIENT
Start: 2024-12-13 | End: 2024-12-13

## 2024-12-13 RX ORDER — HYDROMORPHONE HYDROCHLORIDE 2 MG/ML
0.2 INJECTION, SOLUTION INTRAMUSCULAR; INTRAVENOUS; SUBCUTANEOUS EVERY 5 MIN PRN
Status: DISCONTINUED | OUTPATIENT
Start: 2024-12-13 | End: 2024-12-13 | Stop reason: HOSPADM

## 2024-12-13 RX ORDER — DEXAMETHASONE SODIUM PHOSPHATE 4 MG/ML
INJECTION, SOLUTION INTRA-ARTICULAR; INTRALESIONAL; INTRAMUSCULAR; INTRAVENOUS; SOFT TISSUE
Status: DISCONTINUED | OUTPATIENT
Start: 2024-12-13 | End: 2024-12-13

## 2024-12-13 RX ORDER — GLUCAGON 1 MG
1 KIT INJECTION
Status: DISCONTINUED | OUTPATIENT
Start: 2024-12-13 | End: 2024-12-13 | Stop reason: HOSPADM

## 2024-12-13 RX ORDER — MORPHINE SULFATE 4 MG/ML
2 INJECTION, SOLUTION INTRAMUSCULAR; INTRAVENOUS
Status: DISCONTINUED | OUTPATIENT
Start: 2024-12-13 | End: 2024-12-13 | Stop reason: SDUPTHER

## 2024-12-13 RX ORDER — MORPHINE SULFATE 2 MG/ML
2 INJECTION, SOLUTION INTRAMUSCULAR; INTRAVENOUS
Status: CANCELLED | OUTPATIENT
Start: 2024-12-13

## 2024-12-13 RX ORDER — ROCURONIUM BROMIDE 10 MG/ML
INJECTION, SOLUTION INTRAVENOUS
Status: DISCONTINUED | OUTPATIENT
Start: 2024-12-13 | End: 2024-12-13

## 2024-12-13 RX ORDER — BUPIVACAINE HYDROCHLORIDE 5 MG/ML
INJECTION, SOLUTION EPIDURAL; INTRACAUDAL
Status: DISCONTINUED | OUTPATIENT
Start: 2024-12-13 | End: 2024-12-13 | Stop reason: HOSPADM

## 2024-12-13 RX ORDER — MIDAZOLAM HYDROCHLORIDE 1 MG/ML
INJECTION INTRAMUSCULAR; INTRAVENOUS
Status: DISCONTINUED | OUTPATIENT
Start: 2024-12-13 | End: 2024-12-13

## 2024-12-13 RX ADMIN — KETOROLAC TROMETHAMINE 30 MG: 30 INJECTION, SOLUTION INTRAMUSCULAR; INTRAVENOUS at 03:12

## 2024-12-13 RX ADMIN — GENTAMICIN SULFATE 335.6 MG: 40 INJECTION, SOLUTION INTRAMUSCULAR; INTRAVENOUS at 07:12

## 2024-12-13 RX ADMIN — ROCURONIUM BROMIDE 40 MG: 10 INJECTION, SOLUTION INTRAVENOUS at 02:12

## 2024-12-13 RX ADMIN — AMPICILLIN 2 G: 2 INJECTION, POWDER, FOR SOLUTION INTRAMUSCULAR; INTRAVENOUS at 09:12

## 2024-12-13 RX ADMIN — CLINDAMYCIN PHOSPHATE 900 MG: 900 INJECTION, SOLUTION INTRAVENOUS at 08:12

## 2024-12-13 RX ADMIN — DEXAMETHASONE SODIUM PHOSPHATE 4 MG: 4 INJECTION, SOLUTION INTRA-ARTICULAR; INTRALESIONAL; INTRAMUSCULAR; INTRAVENOUS; SOFT TISSUE at 02:12

## 2024-12-13 RX ADMIN — SODIUM CHLORIDE: 9 INJECTION, SOLUTION INTRAVENOUS at 11:12

## 2024-12-13 RX ADMIN — PROMETHAZINE HYDROCHLORIDE 25 MG: 25 INJECTION INTRAMUSCULAR; INTRAVENOUS at 11:12

## 2024-12-13 RX ADMIN — SCOPOLAMINE 1 PATCH: 1.5 PATCH, EXTENDED RELEASE TRANSDERMAL at 12:12

## 2024-12-13 RX ADMIN — CLINDAMYCIN PHOSPHATE 900 MG: 900 INJECTION, SOLUTION INTRAVENOUS at 12:12

## 2024-12-13 RX ADMIN — MORPHINE SULFATE 2 MG: 4 INJECTION INTRAVENOUS at 11:12

## 2024-12-13 RX ADMIN — LIDOCAINE HYDROCHLORIDE 100 MG: 20 INJECTION, SOLUTION INTRAVENOUS at 02:12

## 2024-12-13 RX ADMIN — PROMETHAZINE HYDROCHLORIDE 6.25 MG: 25 INJECTION INTRAMUSCULAR; INTRAVENOUS at 02:12

## 2024-12-13 RX ADMIN — SUCCINYLCHOLINE CHLORIDE 200 MG: 20 INJECTION, SOLUTION INTRAMUSCULAR; INTRAVENOUS at 02:12

## 2024-12-13 RX ADMIN — AMPICILLIN 2 G: 2 INJECTION, POWDER, FOR SOLUTION INTRAMUSCULAR; INTRAVENOUS at 12:12

## 2024-12-13 RX ADMIN — ACETAMINOPHEN 1000 MG: 10 INJECTION INTRAVENOUS at 02:12

## 2024-12-13 RX ADMIN — ROCURONIUM BROMIDE 10 MG: 10 INJECTION, SOLUTION INTRAVENOUS at 02:12

## 2024-12-13 RX ADMIN — PROPOFOL 200 MG: 10 INJECTION, EMULSION INTRAVENOUS at 02:12

## 2024-12-13 RX ADMIN — FENTANYL CITRATE 100 MCG: 50 INJECTION, SOLUTION INTRAMUSCULAR; INTRAVENOUS at 02:12

## 2024-12-13 RX ADMIN — SODIUM CHLORIDE, SODIUM LACTATE, POTASSIUM CHLORIDE, CALCIUM CHLORIDE AND DEXTROSE MONOHYDRATE: 5; 600; 310; 30; 20 INJECTION, SOLUTION INTRAVENOUS at 08:12

## 2024-12-13 RX ADMIN — SODIUM CHLORIDE, SODIUM GLUCONATE, SODIUM ACETATE, POTASSIUM CHLORIDE, MAGNESIUM CHLORIDE, SODIUM PHOSPHATE, DIBASIC, AND POTASSIUM PHOSPHATE: .53; .5; .37; .037; .03; .012; .00082 INJECTION, SOLUTION INTRAVENOUS at 03:12

## 2024-12-13 RX ADMIN — MIDAZOLAM HYDROCHLORIDE 2 MG: 1 INJECTION, SOLUTION INTRAMUSCULAR; INTRAVENOUS at 02:12

## 2024-12-13 RX ADMIN — SODIUM CHLORIDE, SODIUM GLUCONATE, SODIUM ACETATE, POTASSIUM CHLORIDE, MAGNESIUM CHLORIDE, SODIUM PHOSPHATE, DIBASIC, AND POTASSIUM PHOSPHATE: .53; .5; .37; .037; .03; .012; .00082 INJECTION, SOLUTION INTRAVENOUS at 02:12

## 2024-12-13 NOTE — PLAN OF CARE
Pt prepped for surgery. Consents at bedside. Incentive spirometry taught by respiratory. Pt belongings left in pt room. Family set up with text alerts.

## 2024-12-13 NOTE — NURSING
Rounded on pt to help prepare for surgery. Pt resting in bed and verbalizing 7/10 pain. Pt changed into gown and wiped down w/chlorhexidine wipes. IV placed, fluids started, consents verified, and pain medicine administered. Dr. Osullivan at bedside and reviewed POC w/pt. Periop nurse took pt to preop area.

## 2024-12-13 NOTE — PROGRESS NOTES
HISTORY OF THE PRESENT ILLNESS    2024  Veronique Vick is a 34 y.o. here for evaluation of abdominal pain and nausea.  She had LAVH on 10/28/24 with Dr. Fields.  Last night had sex for the first time since surgery.  Had mild discomfort afterwards, but this morning her pain is severe with associated nausea and diaphoresis.    G'sP's:   LMP: Patient's last menstrual period was 10/06/2024 (approximate).  Relationship: long-term relationship and sexually active  Contraception: post-hysterectomy     ASSESSMENT AND PLAN:  34 y.o. with 1-2cm vaginal cuff dehiscence and visible bowel at the defect.  6.5 weeks post-LAVH.    Admit to hospital for IV Abx (amp/gent/clinda) and pain management  On-call to OR for repair  npo    India Washington MD         GYNECOLOGIC HISTORY  Hx of dysplasia    OBSTETRIC HISTORY  Living children: 1  Vaginal deliveries: 1    PAST MEDICAL HISTORY  -------------------------------------    Closed fracture of distal end of left radius with routine healing    Depression    Migraines    Pap smear of cervix shows high risk HPV present    Wrist fracture    left   HIV    PAST SURGICAL HISTORY  ----------------------------    Colposcopy    multiple    Hysterectomy    Laparoscopic total hysterectomy    Procedure: HYSTERECTOMY, TOTAL, LAPAROSCOPIC;  Surgeon: Yevgeniy Fields MD;  Location: [facility];  Service: OB/GYN;  Laterality: N/A;  uterus , cervix and bilaterlal fallopian tubes removed    Orif radius & ulna fractures    with hardware    [place] tooth extraction     ALLERGIES  Review of patient's allergies indicates:   Allergen Reactions    Eggs [egg derived] Nausea Only     Cannot take egg derived flu vaccine    Ondansetron Dermatitis    Sulfa (sulfonamide antibiotics) Nausea Only    Sulfamethoxazole-trimethoprim Diarrhea     MEDICATIONS  Current Outpatient Medications   Medication Instructions    albuterol (VENTOLIN HFA) 90 mcg/actuation inhaler 2 puffs, Inhalation, Every 6 hours PRN,  Rescue    atogepant (QULIPTA) 60 mg Tab Take 1 tablet (60 mg) by mouth once daily.    pywrgdejr-vcripxta-xtnffeb ala (BIKTARVY) -25 mg (25 kg or greater) 1 tablet, Oral, Daily    buPROPion (WELLBUTRIN XL) 150 mg, Oral, Daily    ergocalciferol (ERGOCALCIFEROL) 50,000 Units, Oral, Every 7 days    ibuprofen (ADVIL,MOTRIN) 800 mg, Oral, Every 8 hours PRN    promethazine (PHENERGAN) 12.5 mg, Every 4 hours PRN    rizatriptan (MAXALT-MLT) 5 MG disintegrating tablet Dissolve 1 tablet in mouth at onset of headache; dissolve another tablet in mouth two hours later if needed; DO NOT USE NO MORE THAN 2 TABLETS PER DAY OR 3 DAYS PER WEEK.    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    --------------------------------------------------------------------------------------------------------------    PHYSICAL EXAM    GEN = alert/oriented, nad, in obvious pain  HEENT = sclera anicteric, EOM grossly normal   =      External: nefg, no lesions     Vagina:  normal mucosa , moderate serous fluid present in vault, pink peristalsing viscera visible at center of vaginal cuff where there is defect, the viscera is not prolapsing through the defect, no active bleeding, gentle digital exam demonstrates a 1-2cm defect    LABS & RADS   Lab Results   Component Value Date    WBC 6.47 10/23/2024    HGB 12.8 10/23/2024    HCT 37.6 10/23/2024     10/23/2024    MCV 92 10/23/2024      Lab Results   Component Value Date    HEPCAB Negative 07/21/2022    HEPBSAG Negative 08/12/2020    [id number] Positive (A) 08/12/2020     HIV RNA undetectable x 2yrs, last level 6/26/2024       India Washington MD

## 2024-12-13 NOTE — OP NOTE
[facility]  Department of Obstetrics & Gynecology  Operative Note      PATIENT NAME: Veronique Vick    MRN: [id number]  TODAY'S DATE: 12/13/2024  ADMIT DATE: 12/13/2024                              OPERATIVE REPORT:  12/13/2024      SURGEON: Familia Osullivan M.D., JE / Dr Vicki Islas    PREOPERATIVE DIAGNOSIS:    Dehiscence of vaginal cuff    POSTOPERATIVE DIAGNOSIS:   1. Dehiscence of vaginal cuff    PROCEDURE:   1. Operative laparoscopy with lysis of adhesions (performed by Dr. Vicki Islas with Dr. Osullivan assist)  2. Repair of vaginal dehiscence    ANESTHESIA: General endotracheal anesthesia.    ESTIMATED BLOOD LOSS: < 50 cc.     URINE OUTPUT: 70 cc    Fluids: 1000 cc Crystalloid    INDICATIONS:  On 10/28/2024 the patient underwent a total laparoscopic hysterectomy with bilateral salpingectomy by Dr. Fields at [facility].  Surgery and the postoperative course was routine without significant issues.  On the evening of 12/12/2020 twenty-four the patient intercourse for the 1st time.  Filley was slightly uncomfortable but the patient noted no issues until the morning of 12/13/2024 at which time at the office noted significant abdominal pain with associated diaphoresis and other issues.  She was examined by Dr. Washington in the office and was found to have a vaginal cuff dehiscence with bowel at the vaginal opening.    The patient was directly admitted from the office and placed in observation status.  Triple antibiotics were initiated and surgery was planned.    FINDINGS:    An approximately 1 x 1 cm vaginal dehiscence with small bowel at the vaginal opening.  On laparoscopic evaluation the patient had significant inflammatory changes throughout but no signs of infection.  Serous appearing fluid throughout but no purulent fluid.  Small bowel and sigmoid adhesions were noted lateral to the vaginal cuff.    Both the right and left ovaries were normal in  appearance.        PROCEDURE IN DETAIL:     Prior to the procedure the patient was seen evaluated initially in her hospital room and once again in the preoperative area.  The pros cons risks benefits alternatives indications of the procedure once again reviewed discussed with the patient.  The patient's questions were answered and she is in agreement with proceeding with the surgical plan.    The patient was taken back to the [facility] surgical suite.  General endotracheal anesthesia was initiated by the anesthesia department.  A sterile prep and drape was performed at this time in the usual manner.    A time-out procedure was now performed with all participating parties in agreement as to the preoperative and operative plan.  The patient received preoperative antibiotics (as above) and sequential compression hose were on.    Attention was initially turned to the vaginal vault.  A sterile speculum was placed into the vaginal vault and the vaginal cuff was evaluated.  A clear dehiscence was noted through which serous nonpurulent appearing fluid was passed.  The vaginal vault was irrigated and the vagina was packed.       Attention was then turned to the abdomen.  The abdomen was evaluated for appropriate placement sites of the trocars.  At this time 0.5% plain Marcaine was injected at all trocar site prior to the incisions.  Dr Islas we will dictate this portion of the case.  Three 5 mm trocar sites were placed, initially the left upper abdomen and an additional two were placed at the patient's previous right and left lateral laparoscopy trocar sites.    Following evaluation of the pelvis with the findings as mentioned above with Dr. Islas monitoring the vaginal cuff laparoscopically, attention was once again turned to the vaginal vault.  The vaginal packing was removed.  A speculum was placed into the vault.  The vaginal cuff was evaluated and inflammatory unhealthy appearing tissue was  excised free circumferentially until healthy-appearing vaginal tissue was noted.  Beginning along the left lateral portion of the vaginal cuff the vaginal cuff was repaired from left-to-right with a series of 0 Vicryl interrupted sutures.  Final closure noted an intact vaginal cuff.  The vagina was once again copiously irrigated with warm normal saline with no visible abnormalities noted     The abdomen and pelvis were now once again evaluated with the laparoscope.  The vaginal cuff closure appeared appropriate.  At this time, surrounding adhesions were lysed by Dr Islas.     The pelvis was now copiously irrigated and suctioned dry.  No active bleeding was noted.  No signs of infection were noted.     The operative peritoneum was now released.  The three trocar trocars were removed.  The skin incisions were closed with 4-0 Vicryl in a subcuticular fashion, followed by skin glue.    The Ponce catheter was removed.       The patient tolerated procedure well.  Final count was reported as correct per nursing.    Familia Osullivan M.D., FACOG

## 2024-12-13 NOTE — BRIEF OP NOTE
[facility]  Brief Operative Note    SUMMARY     Surgery Date: 12/13/2024     Surgeons and Role:     * aFmilia Osullivan MD - Primary     * Sy Islas III, MD - Assisting        Pre-op Diagnosis:  Dehiscence of vaginal cuff, initial encounter [T81.328A]    Post-op Diagnosis:  Post-Op Diagnosis Codes:     * Dehiscence of vaginal cuff, initial encounter [T81.328A]    Procedure(s) (LRB):  LAPAROSCOPY, DIAGNOSTIC (N/A)  REPAIR, VAGINAL CUFF (N/A)  LYSIS, ADHESIONS, LAPAROSCOPIC (N/A)- small bowel to right ovary, sigmoid to left ovary using scissors.     Anesthesia: General    Implants:  * No implants in log *    Operative Findings: no bowel compromise. No abscess. Bowel easily reduced out of the cuff defect.  Two thick adhesions from sigmoid to left ovary and loop of ileum to right ovary - divided with scissors.  I exited the case.  Dr. Osullivan continued to complete the procedure and closed.    Patient brought to the operating room transferred the operating room supine position.  I made a small left upper quadrant incision.  Abdomen insufflated with Veress needle.  Abdomen entered with a 5 mm Visiport.  Patient put into Trendelenburg.  I placed a 5 mm port in the left lateral and right lateral abdomen.  The small bowel was swept away from pelvis.  It swept easily away from the vaginal cuff which you could see was dehisced.  I kept the small bowel retracted so Dr. Osullivan could suture repair this from the vaginal side.  Once the cuff was repaired and intact the bowel was allowed to go back in the pelvis.  We then retracted it back in the cuff again was intact.  No herniation at all.  I knows to thick adhesions from sigmoid to the left ovary and a loop of ileum to the right ovary.  This was divided with scissors and .  After that my portion of the procedure was complete.  Trocars removed.  Skin closed with 4-0 Monocryl.    Estimated Blood Loss: 40 mL    Estimated Blood Loss  has been documented.         Specimens:   Specimen (24h ago, onward)      None            [id number]

## 2024-12-13 NOTE — TRANSFER OF CARE
"Anesthesia Transfer of Care Note    Patient: Veronique Vick    Procedure(s) Performed: Procedure(s) (LRB):  LAPAROSCOPY, DIAGNOSTIC (N/A)  REPAIR, VAGINAL CUFF (N/A)  LYSIS, ADHESIONS, LAPAROSCOPIC (N/A)    Patient location: PACU    Anesthesia Type: general    Transport from OR: Transported from OR on 2-3 L/min O2 by NC with adequate spontaneous ventilation    Post pain: adequate analgesia    Post assessment: no apparent anesthetic complications    Post vital signs: stable    Level of consciousness: awake    Nausea/Vomiting: no nausea/vomiting    Complications: none    Transfer of care protocol was followed      Last vitals: Visit Vitals  /70 (BP Location: Right arm, Patient Position: Lying)   Pulse 83   Temp 36.9 °C (98.4 °F) (Temporal)   Resp 16   Ht 5' 6" (1.676 m)   Wt 78.9 kg (174 lb)   LMP 10/06/2024 (Approximate)   SpO2 100%   Breastfeeding No   BMI 28.08 kg/m²     "

## 2024-12-13 NOTE — ANESTHESIA POSTPROCEDURE EVALUATION
Anesthesia Post Evaluation    Patient: Veronique Vick    Procedure(s) Performed: Procedure(s) (LRB):  LAPAROSCOPY, DIAGNOSTIC (N/A)  REPAIR, VAGINAL CUFF (N/A)  LYSIS, ADHESIONS, LAPAROSCOPIC (N/A)    Final Anesthesia Type: general      Patient location during evaluation: PACU  Patient participation: Yes- Able to Participate  Level of consciousness: awake and alert and oriented  Post-procedure vital signs: reviewed and stable  Pain management: adequate  Airway patency: patent    PONV status at discharge: No PONV  Anesthetic complications: no      Cardiovascular status: blood pressure returned to baseline and stable  Respiratory status: unassisted and spontaneous ventilation  Hydration status: euvolemic  Follow-up not needed.              Vitals Value Taken Time   /75 12/13/24 1610   Temp 36.9 °C (98.4 °F) 12/13/24 1144   Pulse 88 12/13/24 1631   Resp 19 12/13/24 1631   SpO2 99 % 12/13/24 1631   Vitals shown include unfiled device data.      No case tracking events are documented in the log.      Pain/Thelma Score: Pain Rating Prior to Med Admin: 8 (12/13/2024 11:34 AM)  Thelma Score: 8 (12/13/2024  3:59 PM)

## 2024-12-13 NOTE — INTERVAL H&P NOTE
The patient has been examined and the H&P has been reviewed:    I concur with the findings and no changes have occurred since H&P was written.    Surgery risks, benefits and alternative options discussed and understood by patient/family.    The patient is well known to me.  She was seen evaluated initially in her hospital room and then once again in the preoperative area.    We discussed her current issues and surgical plan.    Assessment:   Vaginal cuff dehiscence    Plan:  Diagnostic operative laparoscopy with General surgery and closure of vaginal cuff as well as other indicated procedures    The patient and family's questions regarding the above were answered and they are in agreement with the current plan.      There are no hospital problems to display for this patient.

## 2024-12-13 NOTE — ANESTHESIA PREPROCEDURE EVALUATION
12/13/2024  Veronique Vick is a 34 y.o., female.      Pre-op Assessment    I have reviewed the Patient Summary Reports.     I have reviewed the Nursing Notes. I have reviewed the NPO Status.   I have reviewed the Medications.     Review of Systems  Anesthesia Hx:  No problems with previous Anesthesia                Social:  Non-Smoker       Hematology/Oncology:                   Hematology Comments: HIV                    Cardiovascular:  Cardiovascular Normal                                              Pulmonary:  Pulmonary Normal                       Renal/:  Renal/ Normal                 Hepatic/GI:        IBS             Neurological:      Headaches                                 Endocrine:  Endocrine Normal            Psych:  Psychiatric History  depression              Physical Exam  General: Well nourished, Cooperative, Alert and Oriented    Airway:  Mallampati: II   Mouth Opening: Normal  TM Distance: Normal  Neck ROM: Normal ROM    Anesthesia Plan  Type of Anesthesia, risks & benefits discussed:    Anesthesia Type: Gen ETT, Gen Supraglottic Airway, Gen Natural Airway, MAC  Intra-op Monitoring Plan: Standard ASA Monitors  Post Op Pain Control Plan: multimodal analgesia  Induction:  IV  Airway Plan: Direct, Video and Fiberoptic, Post-Induction  Informed Consent: Informed consent signed with the Patient and all parties understand the risks and agree with anesthesia plan.  All questions answered.   ASA Score: 3 Emergent    Ready For Surgery From Anesthesia Perspective.   .

## 2024-12-13 NOTE — ANESTHESIA PROCEDURE NOTES
Intubation    Date/Time: 12/13/2024 2:29 PM    Performed by: Bandar Howard CRNA  Authorized by: Andrea Osorio MD    Intubation:     Induction:  Rapid sequence induction    Intubated:  Postinduction    Mask Ventilation:  N/a    Attempts:  1    Attempted By:  CRNA    Method of Intubation:  Video laryngoscopy    Blade:  Grey 3    Laryngeal View Grade: Grade I - full view of cords      Difficult Airway Encountered?: No      Complications:  None    Airway Device:  Oral endotracheal tube    Airway Device Size:  7.0    Style/Cuff Inflation:  Cuffed (inflated to minimal occlusive pressure)    Inflation Amount (mL):  5    Tube secured:  21    Secured at:  The lips    Placement Verified By:  Capnometry    Complicating Factors:  None    Findings Post-Intubation:  BS equal bilateral

## 2024-12-13 NOTE — H&P
HISTORY OF THE PRESENT ILLNESS     2024  Veronique Vick is a 34 y.o. here for evaluation of abdominal pain and nausea.  She had LAVH on 10/28/24 with Dr. Fields.  Last night had sex for the first time since surgery.  Had mild discomfort afterwards, but this morning her pain is severe with associated nausea and diaphoresis.     G'sP's:   LMP: Patient's last menstrual period was 10/06/2024 (approximate).  Relationship: long-term relationship and sexually active  Contraception: post-hysterectomy      ASSESSMENT AND PLAN:  34 y.o. with 1-2cm vaginal cuff dehiscence and visible bowel at the defect.  6.5 weeks post-LAVH.     Admit to hospital for IV Abx (amp/gent/clinda) and pain management  On-call to OR for repair  npo     India Washington MD            GYNECOLOGIC HISTORY  Hx of dysplasia     OBSTETRIC HISTORY  Living children: 1  Vaginal deliveries: 1     PAST MEDICAL HISTORY      -------------------------------------    Closed fracture of distal end of left radius with routine healing    Depression    Migraines    Pap smear of cervix shows high risk HPV present    Wrist fracture     left   HIV     PAST SURGICAL HISTORY      ----------------------------    Colposcopy     multiple    Hysterectomy    Laparoscopic total hysterectomy     Procedure: HYSTERECTOMY, TOTAL, LAPAROSCOPIC;  Surgeon: Yevgeniy Fields MD;  Location: [facility];  Service: OB/GYN;  Laterality: N/A;  uterus , cervix and bilaterlal fallopian tubes removed    Orif radius & ulna fractures     with hardware    [place] tooth extraction      ALLERGIES        Review of patient's allergies indicates:   Allergen Reactions    Eggs [egg derived] Nausea Only       Cannot take egg derived flu vaccine    Ondansetron Dermatitis    Sulfa (sulfonamide antibiotics) Nausea Only    Sulfamethoxazole-trimethoprim Diarrhea      MEDICATIONS       Current Outpatient Medications   Medication Instructions    albuterol (VENTOLIN HFA) 90 mcg/actuation  inhaler 2 puffs, Inhalation, Every 6 hours PRN, Rescue    atogepant (QULIPTA) 60 mg Tab Take 1 tablet (60 mg) by mouth once daily.    lbdvbumxz-lmvixfhw-juarilv ala (BIKTARVY) -25 mg (25 kg or greater) 1 tablet, Oral, Daily    buPROPion (WELLBUTRIN XL) 150 mg, Oral, Daily    ergocalciferol (ERGOCALCIFEROL) 50,000 Units, Oral, Every 7 days    ibuprofen (ADVIL,MOTRIN) 800 mg, Oral, Every 8 hours PRN    promethazine (PHENERGAN) 12.5 mg, Every 4 hours PRN    rizatriptan (MAXALT-MLT) 5 MG disintegrating tablet Dissolve 1 tablet in mouth at onset of headache; dissolve another tablet in mouth two hours later if needed; DO NOT USE NO MORE THAN 2 TABLETS PER DAY OR 3 DAYS PER WEEK.    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     --------------------------------------------------------------------------------------------------------------     PHYSICAL EXAM     GEN = alert/oriented, nad, in obvious pain  HEENT = sclera anicteric, EOM grossly normal   =      External: nefg, no lesions     Vagina:  normal mucosa , moderate serous fluid present in vault, pink peristalsing viscera visible at center of vaginal cuff where there is defect, the viscera is not prolapsing through the defect, no active bleeding, gentle digital exam demonstrates a 1-2cm defect     LABS & RADS         Lab Results   Component Value Date     WBC 6.47 10/23/2024     HGB 12.8 10/23/2024     HCT 37.6 10/23/2024      10/23/2024     MCV 92 10/23/2024            Lab Results   Component Value Date     HEPCAB Negative 07/21/2022     HEPBSAG Negative 08/12/2020     [id number] Positive (A) 08/12/2020      HIV RNA undetectable x 2yrs, last level 6/26/2024         India Washington MD        The patient was seen in the office as above.  I was contacted by Dr. Washington and informed of the above    The plan will be for laparoscopy and vaginal closure of the  vaginal cuff with General surgery present to evaluate colon and bowel.    Familia Osullivan MD  OBGYN [facility]

## 2024-12-13 NOTE — PROGRESS NOTES
Pharmacokinetic Initial Assessment: Gentamicin    Assessment:  Weight utilized for dose calculation: Adjusted Body Weight  Dosing method utilized: Dosing by random level    Plan: Extended dosing regimen: Gentamicin 335.6mg mg IV once, followed by a random level 12 hours after the infusion at 0400 12/14/24.    Please contact pharmacy at extension [phone] with any questions regarding this assessment.    Thank you for the consult,    Estee Lynch       Patient brief summary:  Veronique Vick is a 34 y.o. female initiated on aminoglycoside therapy for treatment of suspected skin & soft tissue infection    Drug Allergies:   Review of patient's allergies indicates:   Allergen Reactions    Eggs [egg derived] Nausea Only     Cannot take egg derived flu vaccine    Ondansetron Dermatitis    Sulfa (sulfonamide antibiotics) Nausea Only    Sulfamethoxazole-trimethoprim Diarrhea       Actual Body Weight: 78.9    Adjust Body Weight:   67.2    Ideal Body Weight:  59.3    Renal Function:   Estimated Creatinine Clearance: 104 mL/min (based on SCr of 0.8 mg/dL).,     Dialysis Method (if applicable):  N/A    CBC (last 72 hours):  Recent Labs   Lab Result Units 12/13/24  1200   WBC K/uL 8.99   Hemoglobin g/dL 12.9   Hematocrit % 38.0   Platelets K/uL 151   Gran % % 81.4*   Lymph % % 13.6*   Mono % % 4.3   Eosinophil % % 0.4   Basophil % % 0.2   Differential Method  Automated       Metabolic Panel (last 72 hours):  Recent Labs   Lab Result Units 12/13/24  1200   Sodium mmol/L 138   Potassium mmol/L 4.2   Chloride mmol/L 107   CO2 mmol/L 22*   Glucose mg/dL 101   BUN mg/dL 10   Creatinine mg/dL 0.8   Albumin g/dL 4.1   Total Bilirubin mg/dL 0.6   Alkaline Phosphatase U/L 53   AST U/L 15   ALT U/L 14       Microbiologic Results:  Microbiology Results (last 7 days)       ** No results found for the last 168 hours. **

## 2024-12-14 VITALS
BODY MASS INDEX: 27.95 KG/M2 | DIASTOLIC BLOOD PRESSURE: 61 MMHG | RESPIRATION RATE: 18 BRPM | WEIGHT: 173.94 LBS | HEIGHT: 66 IN | TEMPERATURE: 98 F | HEART RATE: 62 BPM | OXYGEN SATURATION: 97 % | SYSTOLIC BLOOD PRESSURE: 105 MMHG

## 2024-12-14 LAB
BASOPHILS # BLD AUTO: 0.02 K/UL (ref 0–0.2)
BASOPHILS NFR BLD: 0.2 % (ref 0–1.9)
DIFFERENTIAL METHOD BLD: ABNORMAL
EOSINOPHIL # BLD AUTO: 0.1 K/UL (ref 0–0.5)
EOSINOPHIL NFR BLD: 0.6 % (ref 0–8)
ERYTHROCYTE [DISTWIDTH] IN BLOOD BY AUTOMATED COUNT: 13.2 % (ref 11.5–14.5)
GENTAMICIN SERPL-MCNC: 0.7 UG/ML
HCT VFR BLD AUTO: 32.5 % (ref 37–48.5)
HGB BLD-MCNC: 11.1 G/DL (ref 12–16)
IMM GRANULOCYTES # BLD AUTO: 0.04 K/UL (ref 0–0.04)
IMM GRANULOCYTES NFR BLD AUTO: 0.4 % (ref 0–0.5)
LYMPHOCYTES # BLD AUTO: 2.1 K/UL (ref 1–4.8)
LYMPHOCYTES NFR BLD: 19.6 % (ref 18–48)
MCH RBC QN AUTO: 31.1 PG (ref 27–31)
MCHC RBC AUTO-ENTMCNC: 34.2 G/DL (ref 32–36)
MCV RBC AUTO: 91 FL (ref 82–98)
MONOCYTES # BLD AUTO: 0.6 K/UL (ref 0.3–1)
MONOCYTES NFR BLD: 5.8 % (ref 4–15)
NEUTROPHILS # BLD AUTO: 7.7 K/UL (ref 1.8–7.7)
NEUTROPHILS NFR BLD: 73.4 % (ref 38–73)
NRBC BLD-RTO: 0 /100 WBC
PLATELET # BLD AUTO: 152 K/UL (ref 150–450)
PMV BLD AUTO: 11.5 FL (ref 9.2–12.9)
RBC # BLD AUTO: 3.57 M/UL (ref 4–5.4)
WBC # BLD AUTO: 10.46 K/UL (ref 3.9–12.7)

## 2024-12-14 PROCEDURE — 25000003 PHARM REV CODE 250: Performed by: OBSTETRICS & GYNECOLOGY

## 2024-12-14 PROCEDURE — 96366 THER/PROPH/DIAG IV INF ADDON: CPT

## 2024-12-14 PROCEDURE — 63600175 PHARM REV CODE 636 W HCPCS: Performed by: OBSTETRICS & GYNECOLOGY

## 2024-12-14 PROCEDURE — 94799 UNLISTED PULMONARY SVC/PX: CPT

## 2024-12-14 PROCEDURE — 99900035 HC TECH TIME PER 15 MIN (STAT)

## 2024-12-14 PROCEDURE — 63600175 PHARM REV CODE 636 W HCPCS: Mod: JZ,JG | Performed by: GENERAL PRACTICE

## 2024-12-14 PROCEDURE — 80170 ASSAY OF GENTAMICIN: CPT | Performed by: OBSTETRICS & GYNECOLOGY

## 2024-12-14 PROCEDURE — 25000003 PHARM REV CODE 250: Performed by: GENERAL PRACTICE

## 2024-12-14 PROCEDURE — 99900031 HC PATIENT EDUCATION (STAT)

## 2024-12-14 PROCEDURE — 94761 N-INVAS EAR/PLS OXIMETRY MLT: CPT

## 2024-12-14 PROCEDURE — 96361 HYDRATE IV INFUSION ADD-ON: CPT

## 2024-12-14 PROCEDURE — G0378 HOSPITAL OBSERVATION PER HR: HCPCS

## 2024-12-14 PROCEDURE — 85025 COMPLETE CBC W/AUTO DIFF WBC: CPT | Performed by: OBSTETRICS & GYNECOLOGY

## 2024-12-14 PROCEDURE — 63600175 PHARM REV CODE 636 W HCPCS: Performed by: GENERAL PRACTICE

## 2024-12-14 RX ORDER — OXYCODONE AND ACETAMINOPHEN 5; 325 MG/1; MG/1
1 TABLET ORAL EVERY 4 HOURS PRN
Qty: 18 TABLET | Refills: 0 | Status: SHIPPED | OUTPATIENT
Start: 2024-12-14 | End: 2025-01-15

## 2024-12-14 RX ORDER — IBUPROFEN 600 MG/1
600 TABLET ORAL EVERY 6 HOURS PRN
Qty: 40 TABLET | Refills: 1 | Status: SHIPPED | OUTPATIENT
Start: 2024-12-14 | End: 2025-01-15

## 2024-12-14 RX ADMIN — AMPICILLIN 2 G: 2 INJECTION, POWDER, FOR SOLUTION INTRAMUSCULAR; INTRAVENOUS at 12:12

## 2024-12-14 RX ADMIN — AMPICILLIN 2 G: 2 INJECTION, POWDER, FOR SOLUTION INTRAMUSCULAR; INTRAVENOUS at 01:12

## 2024-12-14 RX ADMIN — AMPICILLIN 2 G: 2 INJECTION, POWDER, FOR SOLUTION INTRAMUSCULAR; INTRAVENOUS at 05:12

## 2024-12-14 RX ADMIN — CLINDAMYCIN PHOSPHATE 900 MG: 900 INJECTION, SOLUTION INTRAVENOUS at 03:12

## 2024-12-14 RX ADMIN — BUPROPION HYDROCHLORIDE 150 MG: 150 TABLET, EXTENDED RELEASE ORAL at 09:12

## 2024-12-14 RX ADMIN — SODIUM CHLORIDE, SODIUM LACTATE, POTASSIUM CHLORIDE, CALCIUM CHLORIDE AND DEXTROSE MONOHYDRATE: 5; 600; 310; 30; 20 INJECTION, SOLUTION INTRAVENOUS at 04:12

## 2024-12-14 RX ADMIN — BICTEGRAVIR SODIUM, EMTRICITABINE, AND TENOFOVIR ALAFENAMIDE FUMARATE 1 TABLET: 50; 200; 25 TABLET ORAL at 09:12

## 2024-12-14 RX ADMIN — SERTRALINE HYDROCHLORIDE 50 MG: 50 TABLET ORAL at 09:12

## 2024-12-14 RX ADMIN — CLINDAMYCIN PHOSPHATE 900 MG: 900 INJECTION, SOLUTION INTRAVENOUS at 11:12

## 2024-12-14 RX ADMIN — AMPICILLIN 2 G: 2 INJECTION, POWDER, FOR SOLUTION INTRAMUSCULAR; INTRAVENOUS at 09:12

## 2024-12-14 NOTE — DISCHARGE SUMMARY
[facility]  Department of Obstetrics & Gynecology  Operative Note      PATIENT NAME: Veronique Vick    MRN: [id number]  TODAY'S DATE: 12/14/2024  ADMIT DATE: 12/13/2024    Discharge Summary    Admit Date: 12/13/2024    Discharge Date: 12/14/2024    Attending Physician: Familia Osullivan M.D., FACOG     Reason for Admission:  Post hysterectomy vaginal cuff dehiscence    Procedures Performed:  Operative laparoscopy with lysis of adhesions and repair of vaginal dehiscence (Oliver Osullivan & Janel)  No intraoperative or postoperative complications.     Hospital Course:      The patient currently reports no postoperative issues..  Good pain control on oral medications.    Ambulating without difficulty.   Tolerating regular diet.    No neurologic issues.  No dizziness lightheadedness shortness of breath or chest pain.    Vital signs reviewed.  Vitals:    12/14/24 0828   BP: 111/67   Pulse: (!) 54   Resp: 18   Temp: 98 °F (36.7 °C)     Labs: CBC on 12/14/2024 noted: 10 > 11 / 32.5 < 152      Heart: Regular rate and rhythm  Lungs:  No abnormal pulmonary effort.  Clear to auscultation.  No pulmonary or cardiac issues noted.    Abdomen is soft with appropriate tenderness.  No significant issues with surgical sites.      Discharge Diet: Regular    Discharge Activity: Pelvic rest x 6 weeks, light activity x 2 weeks.     Discharge Medications:      Medication List        START taking these medications      oxyCODONE-acetaminophen 5-325 mg per tablet  Commonly known as: PERCOCET  Take 1 tablet by mouth every 4 (four) hours as needed for Pain (Diagnosis: Acute postoperative pain management).            CHANGE how you take these medications      * ibuprofen 800 MG tablet  Commonly known as: ADVIL,MOTRIN  Take 1 tablet (800 mg total) by mouth every 8 (eight) hours as needed for Pain.  What changed: Another medication with the same name was added. Make sure you understand how and when to take each.     *  ibuprofen 600 MG tablet  Commonly known as: ADVIL,MOTRIN  Take 1 tablet (600 mg total) by mouth every 6 (six) hours as needed for Pain.  What changed: You were already taking a medication with the same name, and this prescription was added. Make sure you understand how and when to take each.           * This list has 2 medication(s) that are the same as other medications prescribed for you. Read the directions carefully, and ask your doctor or other care provider to review them with you.                CONTINUE taking these medications      albuterol 90 mcg/actuation inhaler  Commonly known as: VENTOLIN HFA  Inhale 2 puffs into the lungs every 6 (six) hours as needed for Wheezing. Rescue     BIKTARVY -25 mg (25 kg or greater)  Generic drug: oxmklrqie-bcxhnwtw-gwuocwb ala  Take 1 tablet by mouth once daily.     buPROPion 150 MG TB24 tablet  Commonly known as: WELLBUTRIN XL  Take 1 tablet (150 mg total) by mouth once daily.     ergocalciferol 50,000 unit Cap  Commonly known as: ERGOCALCIFEROL  Take 1 capsule (50,000 Units total) by mouth every 7 days.     promethazine 12.5 MG Tab  Commonly known as: PHENERGAN     QULIPTA 60 mg Tab  Generic drug: atogepant  Take 1 tablet (60 mg) by mouth once daily.     rizatriptan 5 MG disintegrating tablet  Commonly known as: MAXALT-MLT  Dissolve 1 tablet in mouth at onset of headache; dissolve another tablet in mouth two hours later if needed; DO NOT USE NO MORE THAN 2 TABLETS PER DAY OR 3 DAYS PER WEEK.     sertraline 50 MG tablet  Commonly known as: ZOLOFT  Take 1 tablet (50 mg total) by mouth once daily.               Where to Get Your Medications        These medications were sent to [facility]/pharmacy #[id number] - [place], [place] - [address] AT [facility]  [address], [place] [address]      Phone: [phone]   ibuprofen 600 MG tablet  oxyCODONE-acetaminophen 5-325 mg per tablet          Discharge Follow-Up: 2 Weeks or as needed with Dr Osullivan or  Brianne    Condition on Discharge:  stable    Discharge Diagnosis:  S/P:  Operative laparoscopy with lysis of adhesions and repair of vaginal cuff dehiscence    The patient will be discharged after completing 24 hours of IV antibiotics.  Signs and symptoms to monitor for during the postoperative period reviewed discussed.    Precautions and instructions discussed prior to discarge, call with any postoperative/gynecologic issues if necessary prior to postoperative follow-up.      The above was reviewed and discussed with the patient.  Findings at the time of surgery reviewed discussed.    The patient's questions regarding discharge were answered and she is in agreement with the discharge plan.    Familia Osullivan M.D., FACOG

## 2024-12-14 NOTE — NURSING
Team messaged to get clarification on Ampicillin order.  NP Isac changed infusion rate and states pharmacy will correct in the system shortly.  Call placed to pharmacy to clarify new admin times since 1830 dose not given.  This nurse was advised just to begin at next interval of 2145 as per new order.  Order noted and carried out.

## 2024-12-14 NOTE — PLAN OF CARE
POC reviewed with patient this shift.  A/O x4.  Respirations unlabored on RA.  Skin w/d.  Continent of b/b.  Ambulates to restroom without difficulty.  Incisions to ABD continues with dermabond.  No active signs of bleeding/drainage.  Quiet uneventful shift.  Pain well controlled.  No PRN pain med administered this shift.  Tolerates meds whole with water without difficulty.  VSS.  ABX Tx in progress with no adverse effects noted.  See flowsheet for full assessment.  Able to verbalize wants/needs.  No s/s of distress.  Fall/safety precautions maintained.      Problem: Adult Inpatient Plan of Care  Goal: Plan of Care Review  Outcome: Progressing     Problem: Wound  Goal: Optimal Functional Ability  Outcome: Progressing     Problem: Infection  Goal: Absence of Infection Signs and Symptoms  Outcome: Progressing

## 2024-12-14 NOTE — PLAN OF CARE
12/14/24 0801   Patient Assessment/Suction   Level of Consciousness (AVPU) alert   Respiratory Effort Normal;Unlabored   Expansion/Accessory Muscles/Retractions no use of accessory muscles   All Lung Fields Breath Sounds Anterior:;Lateral:;clear   Rhythm/Pattern, Respiratory pattern regular   Cough Frequency no cough   PRE-TX-O2   Device (Oxygen Therapy) room air   SpO2 97 %   Pulse Oximetry Type Intermittent   $ Pulse Oximetry - Multiple Charge Pulse Oximetry - Multiple   Pulse 80   Resp 17   Aerosol Therapy   $ Aerosol Therapy Charges PRN treatment not required   Respiratory Treatment Status (SVN) PRN treatment not required   Incentive Spirometer   $ Incentive Spirometer Charges done with encouragement   Incentive Spirometer Predicted Level (mL) 2550   Administration (IS) proper technique demonstrated   Number of Repetitions (IS) 5   Level Incentive Spirometer (mL) 3000   Patient Tolerance (IS) no adverse signs/symptoms present   General Safety Checklist   Safety Promotion/Fall Prevention side rails raised   Respiratory Interventions   Cough And Deep Breathing done with encouragement   Education   $ Education Bronchodilator;Incentive Spirometry;15 min

## 2024-12-14 NOTE — PROGRESS NOTES
"Pharmacokinetic Follow Up: Gentamicin    Assessment of levels:   Random concentration of 0.7 mcg/mL (9.5 hours post-infusion) corresponds to a dosing interval of every 24 hours per the [place] Nomogram    Regimen Plan:   Continue current dose: Gentamicin 335.6 mg IV every 24 hours    Drug levels (last 3 results):  No results for input(s): "AMIKACINPEAK", "AMIKACINTROU", "AMIKACINRAND", "AMIKACIN" in the last 72 hours.    Recent Labs   Lab Result Units 12/14/24  0506   Gentamicin, Random ug/mL 0.7       No results for input(s): "TOBRA8", "TOBRA10", "TOBRA12", "TOBRARND", "TOBRAMYCIN", "TOBRAPEAK", "TOBRATROUGH", "TOBRAMYCINPE", "TOBRAMYCINRA", "TOBRAMYCINTR" in the last 72 hours.    Pharmacy will continue to monitor.    Please contact pharmacy at extension [phone] with any questions regarding this assessment.    Thank you for the consult,   Andrew Gallo      Patient brief summary:  Veronique Vick is a 34 y.o. female initiated on aminoglycoside therapy for treatment of skin & soft tissue infection    Drug Allergies:   Review of patient's allergies indicates:   Allergen Reactions    Eggs [egg derived] Nausea Only     Cannot take egg derived flu vaccine    Ondansetron Dermatitis    Sulfa (sulfonamide antibiotics) Nausea Only    Sulfamethoxazole-trimethoprim Diarrhea       Actual Body Weight:   78.9    Adjust Body Weight:   67.1    Ideal Body Weight:  59.3    Renal Function:   Estimated Creatinine Clearance: 104 mL/min (based on SCr of 0.8 mg/dL).,     Dialysis Method (if applicable):  N/A    CBC (last 72 hours):  Recent Labs   Lab Result Units 12/13/24  1200 12/14/24  0506   WBC K/uL 8.99 10.46   Hemoglobin g/dL 12.9 11.1*   Hematocrit % 38.0 32.5*   Platelets K/uL 151 152   Gran % % 81.4* 73.4*   Lymph % % 13.6* 19.6   Mono % % 4.3 5.8   Eosinophil % % 0.4 0.6   Basophil % % 0.2 0.2   Differential Method  Automated Automated       Metabolic Panel (last 72 hours):  Recent Labs   Lab Result Units 12/13/24  1200 "   Sodium mmol/L 138   Potassium mmol/L 4.2   Chloride mmol/L 107   CO2 mmol/L 22*   Glucose mg/dL 101   BUN mg/dL 10   Creatinine mg/dL 0.8   Albumin g/dL 4.1   Total Bilirubin mg/dL 0.6   Alkaline Phosphatase U/L 53   AST U/L 15   ALT U/L 14       Aminoglycoside Administrations:  aminoglycosides given in last 96 hours                     gentamicin (GARAMYCIN) 335.6 mg in 0.9% NaCl 100 mL IVPB (mg) 335.6 mg New Bag 12/13/24 1937                    Microbiologic Results:  Microbiology Results (last 7 days)       ** No results found for the last 168 hours. **

## 2024-12-14 NOTE — PLAN OF CARE
Problem: Adult Inpatient Plan of Care  Goal: Plan of Care Review  Outcome: Adequate for Care Transition  Goal: Patient-Specific Goal (Individualized)  Outcome: Adequate for Care Transition  Goal: Absence of Hospital-Acquired Illness or Injury  Outcome: Adequate for Care Transition  Goal: Optimal Comfort and Wellbeing  Outcome: Adequate for Care Transition  Goal: Readiness for Transition of Care  Outcome: Adequate for Care Transition     Problem: Wound  Goal: Optimal Coping  Outcome: Adequate for Care Transition  Goal: Optimal Functional Ability  Outcome: Adequate for Care Transition  Goal: Absence of Infection Signs and Symptoms  Outcome: Adequate for Care Transition  Goal: Improved Oral Intake  Outcome: Adequate for Care Transition  Goal: Optimal Pain Control and Function  Outcome: Adequate for Care Transition  Goal: Skin Health and Integrity  Outcome: Adequate for Care Transition  Goal: Optimal Wound Healing  Outcome: Adequate for Care Transition     Problem: Infection  Goal: Absence of Infection Signs and Symptoms  Outcome: Adequate for Care Transition     Discharge home with family. IV antibiotics completed. Medications sent to outside pharmacy. AVS reviewed with patient. PIV x2 removed, cath intact.

## 2024-12-14 NOTE — PROGRESS NOTES
Automatic Inhaler to Nebulizer Interchange    albuterol (Ventolin, ProAir, or Proventil)  mcg given multiple times per day changed to albuterol 2.5 mg every 6 hours PRN (same as home dose)  per [facility] Automatic Therapeutic Substitutions Protocol.    Please contact pharmacy at extension [phone] with any questions.     Thank you,   Rosa Mario Mai, PharmD

## 2024-12-14 NOTE — PLAN OF CARE
Assessment completed with patient - lives with significant other & 4 children - independent in ADLs - mother will provide transportation home     [facility] - Med/Surg  Initial Discharge Assessment       Primary Care Provider: No, Primary Doctor    Admission Diagnosis: Dehiscence of vaginal cuff, initial encounter [T81.328A]    Admission Date: 12/13/2024  Expected Discharge Date:     Transition of Care Barriers: None    Payor: [employer] / Plan: [employer] [place] / Product Type: Commercial /     Extended Emergency Contact Information  Primary Emergency Contact: Rima Vick  Address: [address] [place] [address] [facility]  Home Phone: [phone]  Mobile Phone: [phone]  Relation: Mother  Preferred language: English   needed? No    Discharge Plan A: Home with family  Discharge Plan B: Home with family      [id number] - [place], [place] - [address] AT [facility]  [address]  [place] [address]  Phone: [phone] Fax: [phone]    [facility]  [address]  [facility] [address]  Phone: [phone] Fax: [phone]    [facility]  [address]  [facility] [address]  Phone: [phone] Fax: [phone]      Initial Assessment (most recent)       Adult Discharge Assessment - 12/14/24 0954          Discharge Assessment    Assessment Type Discharge Planning Assessment     Confirmed/corrected address, phone number and insurance Yes     Confirmed Demographics Correct on Facesheet     Source of Information patient     Does patient/caregiver understand observation status Yes     Communicated DANIEL with patient/caregiver Yes     People in Home significant other;child(alexey), dependent     Do you expect to return to your current living situation? Yes     Do you have help at home or someone to help you manage your care at home? Yes     Prior to  hospitilization cognitive status: Alert/Oriented     Current cognitive status: Alert/Oriented     Walking or Climbing Stairs Difficulty no     Dressing/Bathing Difficulty no     Equipment Currently Used at Home none     Readmission within 30 days? No     Patient currently being followed by outpatient case management? No     Do you currently have service(s) that help you manage your care at home? No     Do you take prescription medications? Yes     Do you have prescription coverage? Yes     Do you have any problems affording any of your prescribed medications? No     Is the patient taking medications as prescribed? yes     Who is going to help you get home at discharge? mother     How do you get to doctors appointments? car, drives self     Are you on dialysis? No     Do you take coumadin? No     Discharge Plan A Home with family     Discharge Plan B Home with family     Discharge Plan discussed with: Patient     Transition of Care Barriers None        Physical Activity    On average, how many days per week do you engage in moderate to strenuous exercise (like a brisk walk)? 3 days     On average, how many minutes do you engage in exercise at this level? 30 min        Financial Resource Strain    How hard is it for you to pay for the very basics like food, housing, medical care, and heating? Not hard at all        Housing Stability    In the last 12 months, was there a time when you were not able to pay the mortgage or rent on time? No     At any time in the past 12 months, were you homeless or living in a shelter (including now)? No        Transportation Needs    Has the lack of transportation kept you from medical appointments, meetings, work or from getting things needed for daily living? No        Food Insecurity    Within the past 12 months, you worried that your food would run out before you got the money to buy more. Never true     Within the past 12 months, the food you bought just didn't last and you didn't  have money to get more. Never true        Stress    Do you feel stress - tense, restless, nervous, or anxious, or unable to sleep at night because your mind is troubled all the time - these days? To some extent        Social Isolation    How often do you feel lonely or isolated from those around you?  Never        Alcohol Use    Q1: How often do you have a drink containing alcohol? Monthly or less     Q2: How many drinks containing alcohol do you have on a typical day when you are drinking? 1 or 2     Q3: How often do you have six or more drinks on one occasion? Never        Utilities    In the past 12 months has the electric, gas, oil, or water company threatened to shut off services in your home? No        Health Literacy    How often do you need to have someone help you when you read instructions, pamphlets, or other written material from your doctor or pharmacy? Never

## 2024-12-14 NOTE — PLAN OF CARE
Case Management Final Discharge Note    Discharge Disposition: home    New DME ordered / company name: N/A    Relevant SDOH / Transition of Care Barriers:  stress    Primary Caretaker and contact information: self    Scheduled followup appointment: ID 1/8 - patient will scheduled post op visit with GYN once clinic reopens     Referrals placed: N/A    Transportation: mother    Patient and family educated on discharge services and updated on DC plan. Bedside RN notified, patient clear to discharge from Case Management Perspective.      --Cleared for discharge from CM standpoint--    [facility]  Discharge Final Note    Primary Care Provider: Veronica, Primary Doctor    Expected Discharge Date: 12/14/2024    Final Discharge Note (most recent)       Final Note - 12/14/24 1253          Final Note    Assessment Type Final Discharge Note     Anticipated Discharge Disposition Home or Self Care     What phone number can be called within the next 1-3 days to see how you are doing after discharge? [phone]     Hospital Resources/Appts/Education Provided Provided patient/caregiver with written discharge plan information;Appointments scheduled and added to AVS        Post-Acute Status    Discharge Delays None known at this time                   Contact Info       Familia Osullivan MD   Specialty: Obstetrics and Gynecology    [facility]  [address]  [facility] [address]   Phone: [phone]       Next Steps: Follow up in 2 week(s)    Instructions: Discharge Instructions:  Follow-up in 2 weeks or as needed.  Call immediately for any abnormal pain bleeding fever chills nausea vomiting or other significant postoperative issues.    Pelvic rest until follow-up.  No tub baths until follow-up.  Diet as tolerated

## 2024-12-18 ENCOUNTER — PATIENT OUTREACH (OUTPATIENT)
Dept: ADMINISTRATIVE | Facility: CLINIC | Age: 34
End: 2024-12-18
Payer: COMMERCIAL

## 2024-12-18 NOTE — PROGRESS NOTES
C3 nurse spoke with Veronique Vick  for a TCC post hospital discharge follow up call. The patient reports does not have a scheduled HOSFU appointment. C3 nurse was unable to schedule HOSFU appointment for [facility]. Patient advised to contact their PCP to schedule a HOSPFU within 5-7 days.

## 2025-01-02 ENCOUNTER — OFFICE VISIT (OUTPATIENT)
Dept: OBSTETRICS AND GYNECOLOGY | Facility: CLINIC | Age: 35
End: 2025-01-02
Payer: COMMERCIAL

## 2025-01-02 DIAGNOSIS — Z09 POSTOPERATIVE EXAMINATION: Primary | ICD-10-CM

## 2025-01-02 NOTE — PROGRESS NOTES
Subjective  Veronique Vick is a 34 y.o. here for a postoperative visit.    3 weeks post-op    Surgery:   1. Operative laparoscopy with lysis of adhesions (performed by Dr. Vicki Islas with Dr. Osullivan assist)  2. Repair of vaginal dehiscence  Date: 12/13/2024   Indication:  Dehiscence of vaginal cuff post-TLH on 10/28/2024  Findings: An approximately 1 x 1 cm vaginal dehiscence with small bowel at the vaginal opening.  On laparoscopic evaluation the patient had significant inflammatory changes throughout but no signs of infection.  Serous appearing fluid throughout but no purulent fluid.  Small bowel and sigmoid adhesions were noted lateral to the vaginal cuff.  Both the right and left ovaries were normal in appearance.    She reports:  Vaginal bleeding: absent  Resumed sex: No  Bowel function normal: Yes  Bladder function normal: Yes  Trouble with incisions: No  Requiring pain meds: No    Objective:    GEN = alert/oriented, nad  HEENT = sclera anicteric, EOM grossly normal  BREASTS = deferred  ABD = Lx incisions healing well   = nefg, suture evident at vaginal cuff, cuff intact, no granulation tissue, normal discharge, no malodor, nontender and intact to palpation on bimanual exam    Assessment and Plan:  34 y.o. doing well post-op.    Continue pelvic rest for 2-4 more weeks.  RTC joey HERMOSILLO MD    --------------------------------------------------------    [facility]  Department of Obstetrics & Gynecology  Operative Note        PATIENT NAME: Veronique Vick    MRN: [id number]  TODAY'S DATE: 12/13/2024  ADMIT DATE: 12/13/2024                                              OPERATIVE REPORT:  12/13/2024      SURGEON: Familia Osullivan M.D., F.A.C.O.G. / Dr Vicki Islas    PREOPERATIVE DIAGNOSIS:    Dehiscence of vaginal cuff    POSTOPERATIVE DIAGNOSIS:   1. Dehiscence of vaginal cuff    PROCEDURE:   1. Operative laparoscopy with lysis of adhesions (performed by   Vicki Islas with Dr. Osullivan assist)  2. Repair of vaginal dehiscence    ANESTHESIA: General endotracheal anesthesia.    ESTIMATED BLOOD LOSS: < 50 cc.     URINE OUTPUT: 70 cc    Fluids: 1000 cc Crystalloid     INDICATIONS:  On 10/28/2024 the patient underwent a total laparoscopic hysterectomy with bilateral salpingectomy by Dr. Fields at [facility].  Surgery and the postoperative course was routine without significant issues.  On the evening of 12/12/2020 twenty-four the patient intercourse for the 1st time.  [place] was slightly uncomfortable but the patient noted no issues until the morning of 12/13/2024 at which time at the office noted significant abdominal pain with associated diaphoresis and other issues.  She was examined by Dr. Washington in the office and was found to have a vaginal cuff dehiscence with bowel at the vaginal opening.     The patient was directly admitted from the office and placed in observation status.  Triple antibiotics were initiated and surgery was planned.    FINDINGS:               An approximately 1 x 1 cm vaginal dehiscence with small bowel at the vaginal opening.  On laparoscopic evaluation the patient had significant inflammatory changes throughout but no signs of infection.  Serous appearing fluid throughout but no purulent fluid.  Small bowel and sigmoid adhesions were noted lateral to the vaginal cuff.    Both the right and left ovaries were normal in appearance.          PROCEDURE IN DETAIL:      Prior to the procedure the patient was seen evaluated initially in her hospital room and once again in the preoperative area.  The pros cons risks benefits alternatives indications of the procedure once again reviewed discussed with the patient.  The patient's questions were answered and she is in agreement with proceeding with the surgical plan.     The patient was taken back to the [facility] surgical suite.  General endotracheal anesthesia was  initiated by the anesthesia department.  A sterile prep and drape was performed at this time in the usual manner.     A time-out procedure was now performed with all participating parties in agreement as to the preoperative and operative plan.  The patient received preoperative antibiotics (as above) and sequential compression hose were on.     Attention was initially turned to the vaginal vault.  A sterile speculum was placed into the vaginal vault and the vaginal cuff was evaluated.  A clear dehiscence was noted through which serous nonpurulent appearing fluid was passed.  The vaginal vault was irrigated and the vagina was packed.       Attention was then turned to the abdomen.  The abdomen was evaluated for appropriate placement sites of the trocars.  At this time 0.5% plain Marcaine was injected at all trocar site prior to the incisions.  Dr Islas we will dictate this portion of the case.  Three 5 mm trocar sites were placed, initially the left upper abdomen and an additional two were placed at the patient's previous right and left lateral laparoscopy trocar sites.     Following evaluation of the pelvis with the findings as mentioned above with Dr. Islas monitoring the vaginal cuff laparoscopically, attention was once again turned to the vaginal vault.  The vaginal packing was removed.  A speculum was placed into the vault.  The vaginal cuff was evaluated and inflammatory unhealthy appearing tissue was excised free circumferentially until healthy-appearing vaginal tissue was noted.  Beginning along the left lateral portion of the vaginal cuff the vaginal cuff was repaired from left-to-right with a series of 0 Vicryl interrupted sutures.  Final closure noted an intact vaginal cuff.  The vagina was once again copiously irrigated with warm normal saline with no visible abnormalities noted     The abdomen and pelvis were now once again evaluated with the laparoscope.  The vaginal cuff closure appeared  appropriate.  At this time, surrounding adhesions were lysed by Dr Islas.     The pelvis was now copiously irrigated and suctioned dry.  No active bleeding was noted.  No signs of infection were noted.     The operative peritoneum was now released.  The three trocar trocars were removed.  The skin incisions were closed with 4-0 Vicryl in a subcuticular fashion, followed by skin glue.     The Ponce catheter was removed.       The patient tolerated procedure well.  Final count was reported as correct per nursing.     Familia Osullivan M.D., FACOG

## 2025-01-09 ENCOUNTER — PATIENT MESSAGE (OUTPATIENT)
Dept: ADMINISTRATIVE | Facility: OTHER | Age: 35
End: 2025-01-09
Payer: COMMERCIAL

## 2025-01-15 ENCOUNTER — OFFICE VISIT (OUTPATIENT)
Dept: INFECTIOUS DISEASES | Facility: CLINIC | Age: 35
End: 2025-01-15
Payer: COMMERCIAL

## 2025-01-15 ENCOUNTER — LAB VISIT (OUTPATIENT)
Dept: LAB | Facility: HOSPITAL | Age: 35
End: 2025-01-15
Attending: STUDENT IN AN ORGANIZED HEALTH CARE EDUCATION/TRAINING PROGRAM
Payer: COMMERCIAL

## 2025-01-15 VITALS
WEIGHT: 175 LBS | OXYGEN SATURATION: 98 % | BODY MASS INDEX: 28.12 KG/M2 | SYSTOLIC BLOOD PRESSURE: 116 MMHG | DIASTOLIC BLOOD PRESSURE: 64 MMHG | HEIGHT: 66 IN | TEMPERATURE: 97 F | HEART RATE: 78 BPM

## 2025-01-15 DIAGNOSIS — Z21 HIV INFECTION, UNSPECIFIED SYMPTOM STATUS: Primary | ICD-10-CM

## 2025-01-15 DIAGNOSIS — T81.328D DEHISCENCE OF VAGINAL CUFF, SUBSEQUENT ENCOUNTER: ICD-10-CM

## 2025-01-15 DIAGNOSIS — Z21 HIV INFECTION, UNSPECIFIED SYMPTOM STATUS: ICD-10-CM

## 2025-01-15 DIAGNOSIS — E55.9 VITAMIN D DEFICIENCY: ICD-10-CM

## 2025-01-15 LAB
ALBUMIN SERPL BCP-MCNC: 4.5 G/DL (ref 3.5–5.2)
ALP SERPL-CCNC: 47 U/L (ref 55–135)
ALT SERPL W/O P-5'-P-CCNC: 12 U/L (ref 10–44)
ANION GAP SERPL CALC-SCNC: 6 MMOL/L (ref 8–16)
AST SERPL-CCNC: 12 U/L (ref 10–40)
BASOPHILS # BLD AUTO: 0.04 K/UL (ref 0–0.2)
BASOPHILS NFR BLD: 0.7 % (ref 0–1.9)
BILIRUB SERPL-MCNC: 0.5 MG/DL (ref 0.1–1)
BUN SERPL-MCNC: 13 MG/DL (ref 6–20)
CALCIUM SERPL-MCNC: 9.3 MG/DL (ref 8.7–10.5)
CHLORIDE SERPL-SCNC: 106 MMOL/L (ref 95–110)
CO2 SERPL-SCNC: 26 MMOL/L (ref 23–29)
CREAT SERPL-MCNC: 0.9 MG/DL (ref 0.5–1.4)
DIFFERENTIAL METHOD BLD: NORMAL
EOSINOPHIL # BLD AUTO: 0.2 K/UL (ref 0–0.5)
EOSINOPHIL NFR BLD: 3.6 % (ref 0–8)
ERYTHROCYTE [DISTWIDTH] IN BLOOD BY AUTOMATED COUNT: 13.1 % (ref 11.5–14.5)
EST. GFR  (NO RACE VARIABLE): >60 ML/MIN/1.73 M^2
GLUCOSE SERPL-MCNC: 93 MG/DL (ref 70–110)
HCT VFR BLD AUTO: 38.5 % (ref 37–48.5)
HGB BLD-MCNC: 12.8 G/DL (ref 12–16)
IMM GRANULOCYTES # BLD AUTO: 0.01 K/UL (ref 0–0.04)
IMM GRANULOCYTES NFR BLD AUTO: 0.2 % (ref 0–0.5)
LYMPHOCYTES # BLD AUTO: 2.5 K/UL (ref 1–4.8)
LYMPHOCYTES NFR BLD: 46 % (ref 18–48)
MCH RBC QN AUTO: 30.8 PG (ref 27–31)
MCHC RBC AUTO-ENTMCNC: 33.2 G/DL (ref 32–36)
MCV RBC AUTO: 93 FL (ref 82–98)
MONOCYTES # BLD AUTO: 0.3 K/UL (ref 0.3–1)
MONOCYTES NFR BLD: 5.8 % (ref 4–15)
NEUTROPHILS # BLD AUTO: 2.4 K/UL (ref 1.8–7.7)
NEUTROPHILS NFR BLD: 43.7 % (ref 38–73)
NRBC BLD-RTO: 0 /100 WBC
PLATELET # BLD AUTO: 194 K/UL (ref 150–450)
PMV BLD AUTO: 11.4 FL (ref 9.2–12.9)
POTASSIUM SERPL-SCNC: 4.2 MMOL/L (ref 3.5–5.1)
PROT SERPL-MCNC: 7.1 G/DL (ref 6–8.4)
RBC # BLD AUTO: 4.16 M/UL (ref 4–5.4)
SODIUM SERPL-SCNC: 138 MMOL/L (ref 136–145)
WBC # BLD AUTO: 5.48 K/UL (ref 3.9–12.7)

## 2025-01-15 PROCEDURE — 80053 COMPREHEN METABOLIC PANEL: CPT | Performed by: STUDENT IN AN ORGANIZED HEALTH CARE EDUCATION/TRAINING PROGRAM

## 2025-01-15 PROCEDURE — 85025 COMPLETE CBC W/AUTO DIFF WBC: CPT | Performed by: STUDENT IN AN ORGANIZED HEALTH CARE EDUCATION/TRAINING PROGRAM

## 2025-01-15 PROCEDURE — G2211 COMPLEX E/M VISIT ADD ON: HCPCS | Mod: S$GLB,,, | Performed by: STUDENT IN AN ORGANIZED HEALTH CARE EDUCATION/TRAINING PROGRAM

## 2025-01-15 PROCEDURE — 86361 T CELL ABSOLUTE COUNT: CPT | Performed by: STUDENT IN AN ORGANIZED HEALTH CARE EDUCATION/TRAINING PROGRAM

## 2025-01-15 PROCEDURE — 3078F DIAST BP <80 MM HG: CPT | Mod: CPTII,S$GLB,, | Performed by: STUDENT IN AN ORGANIZED HEALTH CARE EDUCATION/TRAINING PROGRAM

## 2025-01-15 PROCEDURE — 3074F SYST BP LT 130 MM HG: CPT | Mod: CPTII,S$GLB,, | Performed by: STUDENT IN AN ORGANIZED HEALTH CARE EDUCATION/TRAINING PROGRAM

## 2025-01-15 PROCEDURE — 87536 HIV-1 QUANT&REVRSE TRNSCRPJ: CPT | Performed by: STUDENT IN AN ORGANIZED HEALTH CARE EDUCATION/TRAINING PROGRAM

## 2025-01-15 PROCEDURE — 1159F MED LIST DOCD IN RCRD: CPT | Mod: CPTII,S$GLB,, | Performed by: STUDENT IN AN ORGANIZED HEALTH CARE EDUCATION/TRAINING PROGRAM

## 2025-01-15 PROCEDURE — 99214 OFFICE O/P EST MOD 30 MIN: CPT | Mod: S$GLB,,, | Performed by: STUDENT IN AN ORGANIZED HEALTH CARE EDUCATION/TRAINING PROGRAM

## 2025-01-15 PROCEDURE — 36415 COLL VENOUS BLD VENIPUNCTURE: CPT | Performed by: STUDENT IN AN ORGANIZED HEALTH CARE EDUCATION/TRAINING PROGRAM

## 2025-01-15 PROCEDURE — 3008F BODY MASS INDEX DOCD: CPT | Mod: CPTII,S$GLB,, | Performed by: STUDENT IN AN ORGANIZED HEALTH CARE EDUCATION/TRAINING PROGRAM

## 2025-01-15 RX ORDER — SUMATRIPTAN SUCCINATE 50 MG/1
50 TABLET ORAL
COMMUNITY

## 2025-01-15 NOTE — PROGRESS NOTES
[facility] - Infectious Diseases   Department of Infectious Disease  Office Visit Note        PATIENT NAME: Veronique Vick  YOB: 1990   MRN: [id number]  DATE OF VISIT: 01/15/2025    REASON FOR VISIT: Follow-up and HIV Positive/AIDS      HISTORY OF PRESENT ILLNESS     Veronique Vick is a 34 y.o. female  very pleasant  with past medical history of abnormal Pap smear and HIV diagnosed in  on Triumeq.  She was established patient with Dr. Ledesma, here to establish care.    Patient is a very good historian, mentions she is feeling much better now that she recovered custody of her kid.  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  She mentioned she would like to have her uterus removed keeping history of prior abnormal HPV, she is not very happy having to have serial Pap smears at least 3 a year.      She denies any constitutional symptoms, no acute complaints.  She mentions the Triumeq pill is too big and usually gets nauseous and unable to fully tolerate.  She reports adherence to medication.  Discussed with patient and she is interested in switching to Biktarvy, will send prescription to the pharmacy.    CURRENT ART: Biktarvy    HIV HISTORY:  Diagnosed  - CD4 1038, ratio 41.5% and VL at diagnosis 1527  Risk factor - unprotected sex with infected partner who did not disclose information  History OI - none  History STIs - HPV  Prior ART:   - Triumeq  Genotype testing 20:  PRB = PROBABLE OR EMERGING RESISTANCE   OTHER MUTATIONS DETECTED:   RT GENE MUTATIONS: F214L   FL GENE MUTATIONS: D60E,L63Q,I64V,V77I    HLA- testing negative 20  G6PD testing not performed    Outdoor activities:  Nonsmoker, no alcohol, no drugs.  Works at a medical office.  Travel:  None recent  Implants:  None  Antibiotic history:  None    Social History  Marital Status: Single, has a boyfriend, he is HIV negative   Alcohol History:  reports current alcohol use.  Tobacco  History:  reports that she has never smoked. She has never been exposed to tobacco smoke. She has never used smokeless tobacco.  Drug History:  reports no history of drug use.      INTERVAL HISTORY       1/15/25:  Patient is here for follow-up.  Since last visit she has had multiple events.  She had a hysterectomy on October 28th which was complicated by a dehiscence of her vaginal cuff which required emergent surgery.  She mentioned she is much better now.  She also mentioned she had an incident while cleaning chickens the [employer] her left forearm, needed attention in the emergency room, got stitches and doxycycline.  She mentioned she has been on 4 different antibiotics in the last 3 months, currently not taking anything besides her ART and antidepressants.  She is compliant to ART, has no issues, no missed doses.  Last labs from June 2024 CD4 count 1290, ratio 49.8%, viral load undetectable.  She mentions she is dating someone and she is very happy, partner is HIV negative.  He is aware of her diagnosis.    6/25/2024: Here to establish care.      ALLERGIES AND CURRENT MEDICATIONS     Review of patient's allergies indicates:   Allergen Reactions    Eggs [egg derived] Nausea Only     Cannot take egg derived flu vaccine    Ondansetron Dermatitis    Sulfa (sulfonamide antibiotics) Nausea Only    Sulfamethoxazole-trimethoprim Diarrhea       Current Outpatient Medications   Medication Sig Dispense Refill    albuterol (VENTOLIN HFA) 90 mcg/actuation inhaler Inhale 2 puffs into the lungs every 6 (six) hours as needed for Wheezing. Rescue 18 g 2    atogepant (QULIPTA) 60 mg Tab Take 1 tablet (60 mg) by mouth once daily. 90 tablet 1    fwvivfhop-qrhgjjxc-tmqiciw ala (BIKTARVY) -25 mg (25 kg or greater) Take 1 tablet by mouth once daily. 30 tablet 11    ergocalciferol (ERGOCALCIFEROL) 50,000 unit Cap Take 1 capsule (50,000 Units total) by mouth every 7 days. 12 capsule 3    ibuprofen (ADVIL,MOTRIN) 800 MG  tablet Take 1 tablet (800 mg total) by mouth every 8 (eight) hours as needed for Pain. 30 tablet 0    promethazine (PHENERGAN) 12.5 MG Tab Take 12.5 mg by mouth every 4 (four) hours as needed.      sertraline (ZOLOFT) 50 MG tablet Take 1 tablet (50 mg total) by mouth once daily. 90 tablet 3    sumatriptan (IMITREX) 50 MG tablet Take 50 mg by mouth every 2 (two) hours as needed for Migraine.      buPROPion (WELLBUTRIN XL) 150 MG TB24 tablet Take 1 tablet (150 mg total) by mouth once daily. 30 tablet 11    doxycycline (VIBRAMYCIN) 100 MG Cap Take 1 capsule (100 mg total) by mouth 2 (two) times a day for 10 days. 20 capsule 0    ibuprofen (ADVIL,MOTRIN) 600 MG tablet Take 1 tablet (600 mg total) by mouth every 6 (six) hours as needed for Pain. 40 tablet 1    oxyCODONE-acetaminophen (PERCOCET) 5-325 mg per tablet Take 1 tablet by mouth every 4 (four) hours as needed for Pain (Diagnosis: Acute postoperative pain management). (Patient not taking: Reported on 12/18/2024) 18 tablet 0    rizatriptan (MAXALT-MLT) 5 MG disintegrating tablet Dissolve 1 tablet in mouth at onset of headache; dissolve another tablet in mouth two hours later if needed; DO NOT USE NO MORE THAN 2 TABLETS PER DAY OR 3 DAYS PER WEEK. (Patient not taking: Reported on 1/15/2025) 10 tablet 5     No current facility-administered medications for this visit.       MEDICAL/SURGICAL/FAMILY HISTORY     Past Surgical History:   Procedure Laterality Date    colposcopy      multiple    DIAGNOSTIC LAPAROSCOPY N/A 12/13/2024    Procedure: LAPAROSCOPY, DIAGNOSTIC;  Surgeon: Familia Osullivan MD;  Location: [facility] OR;  Service: OB/GYN;  Laterality: N/A;    HYSTERECTOMY      LAPAROSCOPIC LYSIS OF ADHESIONS N/A 12/13/2024    Procedure: LYSIS, ADHESIONS, LAPAROSCOPIC;  Surgeon: Familia Osullivan MD;  Location: [facility] OR;  Service: OB/GYN;  Laterality: N/A;    LAPAROSCOPIC TOTAL HYSTERECTOMY N/A 10/28/2024    Procedure: HYSTERECTOMY, TOTAL, LAPAROSCOPIC;  Surgeon: Yevgeniy Fields  MD KRYSTINA;  Location: [facility];  Service: OB/GYN;  Laterality: N/A;  uterus , cervix and bilaterlal fallopian tubes removed    ORIF RADIUS & ULNA FRACTURES Left 2016    with hardware    REPAIR OF VAGINAL CUFF N/A 12/13/2024    Procedure: REPAIR, VAGINAL CUFF;  Surgeon: Familia Osullivan MD;  Location: [facility];  Service: OB/GYN;  Laterality: N/A;    WISDOM TOOTH EXTRACTION       Past Medical History:   Diagnosis Date    Closed fracture of distal end of left radius with routine healing 03/31/2016    Depression     Migraines     Pap smear of cervix shows high risk HPV present     Wrist fracture     left     Family History   Problem Relation Name Age of Onset    Migraines Mother      Hyperlipidemia Father      Hypertension Father      Migraines Sister            REVIEW OF SYSTEMS     Review of Systems  Constitutional:  Denies fevers, chills, night sweats, loss of appetite.  HEENT: Denies visual changes,ear pain, sinus congestion, mouth pain or trouble swallowing, sore throat or  dental pain.  Neck: Denies neck pain or lumps.  Respiratory: Denies shortness of breath, coughing, wheezing or hemoptysis.  Cardiovascular:  Denies chest pain, palpitations or edema.  Gastrointestinal: Denies  nausea, vomiting, constipation or diarrhea.  Genitourinary:  Denies dysuria, frequency, urgency or hematuria - vaginal leakage improved  Musculoskeletal:  Denies joint pain or swelling, difficulty walking.    Skin:  Denies rash or itching.  Neurologic:  Denies motor or sensory loss, headaches or dizziness.    Psychiatric:  Denies changes in mood or behavior.      PHYSICAL EXAM     Vital Signs  Wt Readings from Last 3 Encounters:   01/15/25 79.4 kg (175 lb)   12/13/24 78.9 kg (173 lb 15.1 oz)   12/09/24 79 kg (174 lb 2.6 oz)     Temp Readings from Last 3 Encounters:   01/15/25 97.2 °F (36.2 °C) (Temporal)   12/14/24 98.1 °F (36.7 °C) (Oral)   10/28/24 98 °F (36.7 °C) (Skin)     BP Readings from Last 3 Encounters:   01/15/25 116/64   12/14/24  105/61   11/11/24 112/74     Pulse Readings from Last 3 Encounters:   01/15/25 78   12/14/24 62   10/28/24 95       Physical Exam  General:  Very pleasant  female, breathing comfortable on room air  Eyes: Eyes with no icterus or injection. Vision grossly normal  Ears: Hearing grossly normal.  Nose: Nares patent  Mouth: Moist mucous membranes, dentition is very good. No ulcerations, erythema or exudates.  Neck: Supple, no tenderness to palpation.  Cardiovascular: Regular rate and rhythm, no murmurs, no edema.    Respiratory:  Clear to auscultation bilaterally, no tachypnea or increased work of breathing.  Gastrointestinal:  Small surgical wounds from laparoscopic surgery, healed, no evidence of cellulitis.  Soft with active bowel sounds, no tenderness to palpation, no distention.  Genitourinary:  No suprapubic tenderness.  Musculoskeletal:  Moves all extremities with equal strength.    Skin:  Warm and dry, no obvious rashes.    Neuro:   Oriented, conversant, follows commands.  Psych: Good mood, normal affect.      HEALTH MAINTENANCE     Health Screening:    The following items were screened for at today's visit:  [] Substance Abuse  [] Alcohol Abuse  [] Depression  [] Employment  [] Housing Situation/Homelessness  [] Travel  [] TB Exposure   [] Domestic Abuse   [] Smoking Cessation    Travel History: [place], [place], [place] (cruise)  Vaccine History: Hep B series (6th grade), Tdap 2/2018, yearly flu vaccine(egg allergy), zostavax 2013, COVID x 3 2021, menactra #1 2022,   Advanced Directive:   Safer Sex:  abstinent currently  Gyn: (Dr. Fields) UTD, repeat PAP 4/2022, 10/22 abnormal but improved, 5/2023 with CIN1, HPV negative now, still ASCUS on last pap  Mammogram: at age 39  Bone Density: at age 64  Colonoscopy: at age 39    Vaccines    Immunization History   Administered Date(s) Administered    COVID-19, MRNA, LN-S, PF (Pfizer) (Purple Cap) 12/17/2020, 01/07/2021, 12/28/2021    DTaP 1989, 02/06/1990,  "03/27/1990, 05/22/1991, 07/18/1995    HIB 01/02/1991    Hepatitis A, Adult 09/09/2020, 03/08/2021    Influenza (FLUBLOK) - Quadrivalent - Recombinant - PF *Preferred* (egg allergy) 02/04/2020, 10/13/2020, 10/28/2021, 12/01/2022    Influenza - Quadrivalent 09/10/2020    Influenza - Quadrivalent - MDCK - PF 11/14/2023    Influenza - Trivalent - Flucelvax - PF 10/21/2024    MMR 01/02/1991, 07/18/1995    Meningococcal Conjugate (MCV4O) 1 Vial Dose(10yr-55yr) 01/13/2022    Meningococcal Conjugate (MCV4P) 01/13/2022    OPV 1989, 02/06/1990, 05/22/1991, 07/18/1995    Pneumococcal Conjugate - 13 Valent 12/07/2020    Pneumococcal Polysaccharide - 23 Valent 02/12/2021       ASCVD Risk Score:  Consider high-intensity statin therapy if 10-year ASCVD risk score >7.5%  The ASCVD Risk score (Willard SIDDIQI, et al., 2019) failed to calculate for the following reasons:    The 2019 ASCVD risk score is only valid for ages 40 to 79    LABS AND DIAGNOSTICS     Significant Labs: I have reviewed all relevant and available labs and microbiology. .    HIV Viral Load:   HIV VL: undetectable, Date: 12/18/23      CD4 Count:1290, ratio 49.8%    No results found for: "WBCABSOLUTE", "LYMPHPERCENT", "LYMPHABSOLUT", "[id number]", "TCELLINTERP"    CBC with Differential:   Lab Results   Component Value Date    WBC 10.46 12/14/2024    RBC 3.57 (L) 12/14/2024    HGB 11.1 (L) 12/14/2024    HCT 32.5 (L) 12/14/2024    MCV 91 12/14/2024    MCH 31.1 (H) 12/14/2024    MCHC 34.2 12/14/2024    RDW 13.2 12/14/2024     12/14/2024    MPV 11.5 12/14/2024       CMP:   Lab Results   Component Value Date     12/13/2024    K 4.2 12/13/2024     12/13/2024    ANIONGAP 9 12/13/2024    BUN 10 12/13/2024    CREATININE 0.8 12/13/2024    CALCIUM 9.2 12/13/2024    BILITOT 0.6 12/13/2024    AST 15 12/13/2024    ALT 14 12/13/2024    ALKPHOS 53 12/13/2024    PROT 6.5 12/13/2024    ALBUMIN 4.1 12/13/2024       Quantiferon:   TB Gold: Negative Date: " "12/18/2023    Syphilis IgG: Negative 6/19/23    Toxoplasmosis IgG: POSITIVE 8/17/20    No results found for: "NGONNO", "LABCHLAPCR"    Lipid Panel: check q1yr  Lab Results   Component Value Date    TRIG 65 06/26/2024    CHOL 192 06/26/2024    HDL 54 06/26/2024    NONHDLCHOL 138 06/26/2024    LDLCALC 125.0 06/26/2024       A1C: check q1yr  Lab Results   Component Value Date    HGBA1C 4.9 06/26/2024       Urinalysis: check q1yr, check q6mo if taking tenofovir  Lab Results   Component Value Date    PROTEINUA Negative 07/05/2022    UROBILINOGEN 0.2 05/31/2023    KETONESU Negative 05/31/2023    NITRITE Negative 05/31/2023    LEUKOCYTESUR 1+ (A) 07/05/2022    COLORU Yellow 05/31/2023    RBCUA 20 (H) 07/05/2022         Hepatitis A IgG: negative 8/17/20     Hepatitis B Surface Antibody: check once  Lab Results   Component Value Date    HEPBSAB Reactive 08/17/2020       Hepatitis C Antibody: check q1yr if high risk, once if low risk  Lab Results   Component Value Date    HEPCAB Negative 07/21/2022         CrCl cannot be calculated (Patient's most recent lab result is older than the maximum 7 days allowed.).    INFLAMMATORY/PROCAL  LAST 7    No results found for: "ESR"  No results found for: "CRP"    PRIOR MICROBIOLOGY:  No results found for the last 90 days.      LAST 7 DAYS MICROBIOLOGY   Microbiology Results (last 7 days)       ** No results found for the last 168 hours. **            Pathology:  5/21/24: PAP smear: Cytology Results: Negative for intraepithelial lesion or malignancy.    Significant Diagnostics: I have reviewed all relevant and available diagnostic results.    IMAGING  DEXA Scan: if age>50, check q10yrs  No results found for this or any previous visit.    CURRENT/PREVIOUS VISIT EKG  No results found for this or any previous visit.    ASSESSMENT AND PLAN:     HIV stable on Biktarvy, stable  Last labs from June 20, 2024 CD4 count:  1290, ratio 49.8%, viral load undetectable  Labs today  Continue Biktarvy 1 " tab p.o. daily  RTC in 6 months    History of dehiscence vaginal cuff, status post surgery 12/13/24 - improved     Laceration left forearm, knife slipped cleaning chickens, resolved       HIV infection, unspecified symptom status    Vitamin D deficiency    Dehiscence of vaginal cuff, subsequent encounter      RTC in 6 months       I spent a total of 30 minutes on the day of the visit.        Palmira Miller MD  Date of Service: 01/15/2025      This note was created using [employer]  voice recognition software that occasionally misinterpreted phrases or words.

## 2025-01-17 LAB
BASOPHILS # BLD AUTO: 0 X10E3/UL (ref 0–0.2)
BASOPHILS NFR BLD AUTO: 1 %
CD3+CD4+ CELLS # BLD: 1213 /UL (ref 359–1519)
CD3+CD4+ CELLS NFR BLD: 48.5 % (ref 30.8–58.5)
EOSINOPHIL # BLD AUTO: 0.2 X10E3/UL (ref 0–0.4)
EOSINOPHIL NFR BLD AUTO: 3 %
ERYTHROCYTE [DISTWIDTH] IN BLOOD BY AUTOMATED COUNT: 13.4 % (ref 11.7–15.4)
HCT VFR BLD AUTO: 40.1 % (ref 34–46.6)
HGB BLD-MCNC: 13.4 G/DL (ref 11.1–15.9)
IMM GRANULOCYTES # BLD AUTO: 0 X10E3/UL (ref 0–0.1)
IMM GRANULOCYTES NFR BLD AUTO: 0 %
LYMPHOCYTES # BLD AUTO: 2.5 X10E3/UL (ref 0.7–3.1)
LYMPHOCYTES NFR BLD AUTO: 47 %
MCH RBC QN AUTO: 31.4 PG (ref 26.6–33)
MCHC RBC AUTO-ENTMCNC: 33.4 G/DL (ref 31.5–35.7)
MCV RBC AUTO: 94 FL (ref 79–97)
MONOCYTES # BLD AUTO: 0.3 X10E3/UL (ref 0.1–0.9)
MONOCYTES NFR BLD AUTO: 6 %
NEUTROPHILS # BLD AUTO: 2.3 X10E3/UL (ref 1.4–7)
NEUTROPHILS NFR BLD AUTO: 43 %
PLATELET # BLD AUTO: 199 X10E3/UL (ref 150–450)
RBC # BLD AUTO: 4.27 X10E6/UL (ref 3.77–5.28)
WBC # BLD AUTO: 5.3 X10E3/UL (ref 3.4–10.8)

## 2025-01-18 LAB
HIV1 RNA # SERPL NAA+PROBE: <20 COPIES/ML
HIV1 RNA SERPL NAA+PROBE-LOG#: NORMAL LOG10COPY/ML

## 2025-01-30 NOTE — OP NOTE
Surgery Date: 12/13/2024     Surgeons and Role:     * Familia Osullivan MD - Primary     * Sy Islas III, MD - Assisting        Pre-op Diagnosis:  Dehiscence of vaginal cuff, initial encounter [T81.328A]    Post-op Diagnosis:  Post-Op Diagnosis Codes:     * Dehiscence of vaginal cuff, initial encounter [T81.328A]    Procedure(s) (LRB):  LAPAROSCOPY, DIAGNOSTIC (N/A)  REPAIR, VAGINAL CUFF (N/A)  LYSIS, ADHESIONS, LAPAROSCOPIC (N/A)- small bowel to right ovary, sigmoid to left ovary using scissors.     Anesthesia: General    Implants:  * No implants in log *    Operative Findings: no bowel compromise. No abscess. Bowel easily reduced out of the cuff defect.  Two thick adhesions from sigmoid to left ovary and loop of ileum to right ovary - divided with scissors.  I exited the case.  Dr. Osullivan continued to complete the procedure and closed.    Patient brought to the operating room transferred the operating room supine position.  I made a small left upper quadrant incision.  Abdomen insufflated with Veress needle.  Abdomen entered with a 5 mm Visiport.  Patient put into Trendelenburg.  I placed a 5 mm port in the left lateral and right lateral abdomen.  The small bowel was swept away from pelvis.  It swept easily away from the vaginal cuff which you could see was dehisced.  I kept the small bowel retracted so Dr. Osullivan could suture repair this from the vaginal side.  Once the cuff was repaired and intact the bowel was allowed to go back in the pelvis.  We then retracted it back in the cuff again was intact.  No herniation at all.  I knows to thick adhesions from sigmoid to the left ovary and a loop of ileum to the right ovary.  This was divided with scissors and .  After that my portion of the procedure was complete.  Trocars removed.  Skin closed with 4-0 Monocryl.    Estimated Blood Loss: 40 mL    Estimated Blood Loss has been documented.         Specimens:   Specimen (24h ago, onward)      None             [id number]

## 2025-01-31 ENCOUNTER — OFFICE VISIT (OUTPATIENT)
Dept: OBSTETRICS AND GYNECOLOGY | Facility: CLINIC | Age: 35
End: 2025-01-31
Payer: COMMERCIAL

## 2025-01-31 VITALS
RESPIRATION RATE: 18 BRPM | HEIGHT: 66 IN | WEIGHT: 172.38 LBS | BODY MASS INDEX: 27.7 KG/M2 | SYSTOLIC BLOOD PRESSURE: 122 MMHG | DIASTOLIC BLOOD PRESSURE: 74 MMHG

## 2025-01-31 DIAGNOSIS — Z09 POSTOPERATIVE EXAMINATION: Primary | ICD-10-CM

## 2025-01-31 PROCEDURE — 99999 PR PBB SHADOW E&M-EST. PATIENT-LVL III: CPT | Mod: PBBFAC,,, | Performed by: GENERAL PRACTICE

## 2025-01-31 PROCEDURE — 99499 UNLISTED E&M SERVICE: CPT | Mod: S$GLB,,, | Performed by: GENERAL PRACTICE

## 2025-01-31 NOTE — PROGRESS NOTES
ASSESSMENT AND PLAN     01/31/2025  Veornique Vick is a 34 y.o. , 7 weeks post-op, doing well.    Activity restrictions lifted  Return to clinic: for annual GYN check-up, sooner prn      SUBJECTIVE     01/31/2025    Surgery: Lx JIA and vaginal closure of vaginal cuff dehiscence  Date: 12/13/24    She reports:  Vaginal bleeding: absent  Resumed sex: No  Bowel function normal: Yes  Bladder function normal: Yes  Trouble with incisions: No  Requiring pain meds: No    OBJECTIVE     PHYSICAL EXAM  Vitals:    01/31/25 1106   BP: 122/74   Resp: 18     GEN = alert/oriented, nad, pleasant  HEENT = sclera anicteric, EOM grossly normal  CV = BP and HR as per vitals  PULM = normal respiratory effort   =      External: nefg, no lesions     Vagina: normal and without lesions and urethral meatus normal     Discharge: normal and physiologic     Cervix: surgically absent and vaginal cuff well healed with no suture / granulation tissue / ttp    India Washington MD

## 2025-02-04 RX ORDER — SERTRALINE HYDROCHLORIDE 50 MG/1
50 TABLET, FILM COATED ORAL DAILY
Qty: 90 TABLET | Refills: 3 | Status: SHIPPED | OUTPATIENT
Start: 2025-02-04 | End: 2026-02-04

## 2025-03-03 DIAGNOSIS — M25.532 LEFT WRIST PAIN: Primary | ICD-10-CM

## 2025-03-05 ENCOUNTER — HOSPITAL ENCOUNTER (OUTPATIENT)
Dept: RADIOLOGY | Facility: HOSPITAL | Age: 35
Discharge: HOME | End: 2025-03-05
Attending: PHYSICIAN ASSISTANT
Payer: COMMERCIAL

## 2025-03-05 DIAGNOSIS — M25.532 LEFT WRIST PAIN: ICD-10-CM

## 2025-03-05 PROCEDURE — 73110 X-RAY EXAM OF WRIST: CPT | Mod: 26,LT,, | Performed by: RADIOLOGY

## 2025-03-05 PROCEDURE — 73110 X-RAY EXAM OF WRIST: CPT | Mod: TC,LT

## 2025-03-17 ENCOUNTER — OFFICE VISIT (OUTPATIENT)
Dept: FAMILY MEDICINE | Facility: CLINIC | Age: 35
End: 2025-03-17
Payer: COMMERCIAL

## 2025-03-17 DIAGNOSIS — F41.9 ANXIETY AND DEPRESSION: Primary | ICD-10-CM

## 2025-03-17 DIAGNOSIS — Z13.6 ENCOUNTER FOR LIPID SCREENING FOR CARDIOVASCULAR DISEASE: ICD-10-CM

## 2025-03-17 DIAGNOSIS — Z13.220 ENCOUNTER FOR LIPID SCREENING FOR CARDIOVASCULAR DISEASE: ICD-10-CM

## 2025-03-17 DIAGNOSIS — F32.A ANXIETY AND DEPRESSION: Primary | ICD-10-CM

## 2025-03-17 DIAGNOSIS — E55.9 VITAMIN D DEFICIENCY: ICD-10-CM

## 2025-03-17 DIAGNOSIS — R53.83 FATIGUE, UNSPECIFIED TYPE: ICD-10-CM

## 2025-03-17 PROCEDURE — 1160F RVW MEDS BY RX/DR IN RCRD: CPT | Mod: CPTII,95,,

## 2025-03-17 PROCEDURE — 98006 SYNCH AUDIO-VIDEO EST MOD 30: CPT | Mod: 95,,,

## 2025-03-17 PROCEDURE — 1159F MED LIST DOCD IN RCRD: CPT | Mod: CPTII,95,,

## 2025-03-17 RX ORDER — SERTRALINE HYDROCHLORIDE 50 MG/1
50 TABLET, FILM COATED ORAL DAILY
Qty: 90 TABLET | Refills: 3 | Status: SHIPPED | OUTPATIENT
Start: 2025-03-17 | End: 2026-03-17

## 2025-03-17 RX ORDER — BUPROPION HYDROCHLORIDE 150 MG/1
150 TABLET ORAL DAILY
Qty: 90 TABLET | Refills: 3 | Status: SHIPPED | OUTPATIENT
Start: 2025-03-17

## 2025-03-17 NOTE — PROGRESS NOTES
Subjective:       Patient ID: Veronique Vick is a 34 y.o. female.  The patient location is: [place], [place]  The chief complaint leading to consultation is: Follow up    Visit type: audiovisual    Face to Face time with patient: 13 minutes  15 minutes of total time spent on the encounter, which includes face to face time and non-face to face time preparing to see the patient (eg, review of tests), Obtaining and/or reviewing separately obtained history, Documenting clinical information in the electronic or other health record, Independently interpreting results (not separately reported) and communicating results to the patient/family/caregiver, or Care coordination (not separately reported).         Each patient to whom he or she provides medical services by telemedicine is:  (1) informed of the relationship between the physician and patient and the respective role of any other health care provider with respect to management of the patient; and (2) notified that he or she may decline to receive medical services by telemedicine and may withdraw from such care at any time.      Chief Complaint: Follow up    Patient presents virtually to the clinic for a follow up.     Patient Active Problem List:     Pap smear of cervix with ASCUS, cannot exclude HGSIL     Migraine     Irritable bowel syndrome     Decreased strength     Muscle tightness     HIV infection     Request for sterilization     Pap smear of cervix shows high risk HPV present     Menorrhagia     Dehiscence of vaginal cuff    Has no new complaints or concerns today.     Patient educated on plan of care, verbalized understanding.         Review of Systems   Constitutional:  Positive for fatigue. Negative for activity change, appetite change, chills, diaphoresis and fever.   HENT:  Negative for congestion, ear pain, postnasal drip, sinus pressure, sneezing and sore throat.    Eyes:  Negative for pain, discharge, redness and itching.   Respiratory:  Negative  for apnea, cough, chest tightness, shortness of breath and wheezing.    Cardiovascular:  Negative for chest pain and leg swelling.   Gastrointestinal:  Negative for abdominal distention, abdominal pain, constipation, diarrhea, nausea and vomiting.   Genitourinary:  Negative for difficulty urinating, dysuria, flank pain and frequency.   Skin:  Negative for color change, rash and wound.   Neurological:  Negative for dizziness.   All other systems reviewed and are negative.      Problem List[1]    Objective:      Physical Exam  Constitutional:       General: She is not in acute distress.     Appearance: Normal appearance. She is not ill-appearing.   HENT:      Head: Normocephalic.   Eyes:      Conjunctiva/sclera: Conjunctivae normal.      Pupils: Pupils are equal, round, and reactive to light.      Comments: As seen on virtual visit   Pulmonary:      Effort: Pulmonary effort is normal. No respiratory distress.   Musculoskeletal:      Cervical back: Normal range of motion and neck supple.   Skin:     General: Skin is warm and dry.   Neurological:      General: No focal deficit present.      Mental Status: She is alert and oriented to person, place, and time.   Psychiatric:         Mood and Affect: Mood normal.         Behavior: Behavior normal.         Lab Results   Component Value Date    WBC 5.48 01/15/2025    WBC 5.3 01/15/2025    HGB 12.8 01/15/2025    HGB 13.4 01/15/2025    HCT 38.5 01/15/2025    HCT 40.1 01/15/2025     01/15/2025     01/15/2025    CHOL 171 03/19/2025    TRIG 54 03/19/2025    HDL 58 03/19/2025    ALT 12 01/15/2025    AST 12 01/15/2025     01/15/2025    K 4.2 01/15/2025     01/15/2025    CREATININE 0.9 01/15/2025    BUN 13 01/15/2025    CO2 26 01/15/2025    TSH 0.557 03/19/2025    HGBA1C 4.9 06/26/2024     The ASCVD Risk score (Willard DK, et al., 2019) failed to calculate for the following reasons:    The 2019 ASCVD risk score is only valid for ages 40 to 79    Assessment:        1. Anxiety and depression    2. Fatigue, unspecified type    3. Vitamin D deficiency    4. Encounter for lipid screening for cardiovascular disease        Plan:       1. Anxiety and depression  -     buPROPion (WELLBUTRIN XL) 150 MG TB24 tablet; Take 1 tablet (150 mg total) by mouth once daily.  Dispense: 90 tablet; Refill: 3  -     sertraline (ZOLOFT) 50 MG tablet; Take 1 tablet (50 mg total) by mouth once daily.  Dispense: 90 tablet; Refill: 3   - Stable-Continue Wellbutrin and Zoloft    - Continue current plan of care    2. Fatigue, unspecified type  -     TSH; Future; Expected date: 03/17/2025  -     Iron and TIBC; Future; Expected date: 03/17/2025  -     Ferritin; Future; Expected date: 03/17/2025  -     Vitamin B12; Future; Expected date: 03/17/2025    3. Vitamin D deficiency  -      Misc Sendout Test, Blood Vitamin D; Future; Expected date: 03/17/2025    4. Encounter for lipid screening for cardiovascular disease  -     Lipid Panel; Future; Expected date: 03/17/2025       Follow up if symptoms worsen or fail to improve.      Future Appointments       Date Provider Specialty Appt Notes    4/15/2025 Maximilian Amaya, [facility] Optometry Annual eye exam    7/16/2025 Palmira Montano MD Infectious Diseases follow up 6 month                  [1]   Patient Active Problem List  Diagnosis    Pap smear of cervix with ASCUS, cannot exclude HGSIL    Migraine    Irritable bowel syndrome    Decreased strength    Muscle tightness    HIV infection    Request for sterilization    Pap smear of cervix shows high risk HPV present    Menorrhagia    Dehiscence of vaginal cuff

## 2025-03-19 ENCOUNTER — LAB VISIT (OUTPATIENT)
Dept: LAB | Facility: HOSPITAL | Age: 35
End: 2025-03-19
Payer: COMMERCIAL

## 2025-03-19 DIAGNOSIS — Z13.6 ENCOUNTER FOR LIPID SCREENING FOR CARDIOVASCULAR DISEASE: ICD-10-CM

## 2025-03-19 DIAGNOSIS — Z13.220 ENCOUNTER FOR LIPID SCREENING FOR CARDIOVASCULAR DISEASE: ICD-10-CM

## 2025-03-19 DIAGNOSIS — E55.9 VITAMIN D DEFICIENCY: ICD-10-CM

## 2025-03-19 DIAGNOSIS — R53.83 FATIGUE, UNSPECIFIED TYPE: ICD-10-CM

## 2025-03-19 LAB
25(OH)D3+25(OH)D2 SERPL-MCNC: 55 NG/ML (ref 30–96)
CHOLEST SERPL-MCNC: 171 MG/DL (ref 120–199)
CHOLEST/HDLC SERPL: 2.9 {RATIO} (ref 2–5)
FERRITIN SERPL-MCNC: 44 NG/ML (ref 20–300)
HDLC SERPL-MCNC: 58 MG/DL (ref 40–75)
HDLC SERPL: 33.9 % (ref 20–50)
IRON SERPL-MCNC: 76 UG/DL (ref 30–160)
LDLC SERPL CALC-MCNC: 102.2 MG/DL (ref 63–159)
NONHDLC SERPL-MCNC: 113 MG/DL
SATURATED IRON: 23 % (ref 20–50)
TOTAL IRON BINDING CAPACITY: 336 UG/DL (ref 250–450)
TRANSFERRIN SERPL-MCNC: 240 MG/DL (ref 200–375)
TRIGL SERPL-MCNC: 54 MG/DL (ref 30–150)
TSH SERPL DL<=0.005 MIU/L-ACNC: 0.56 UIU/ML (ref 0.4–4)
VIT B12 SERPL-MCNC: 320 PG/ML (ref 210–950)

## 2025-03-19 PROCEDURE — 80061 LIPID PANEL: CPT

## 2025-03-19 PROCEDURE — 84466 ASSAY OF TRANSFERRIN: CPT

## 2025-03-19 PROCEDURE — 82607 VITAMIN B-12: CPT

## 2025-03-19 PROCEDURE — 84443 ASSAY THYROID STIM HORMONE: CPT

## 2025-03-19 PROCEDURE — 82728 ASSAY OF FERRITIN: CPT

## 2025-03-19 PROCEDURE — 36415 COLL VENOUS BLD VENIPUNCTURE: CPT

## 2025-03-19 PROCEDURE — 82306 VITAMIN D 25 HYDROXY: CPT

## 2025-03-19 NOTE — PATIENT INSTRUCTIONS

## 2025-03-22 ENCOUNTER — RESULTS FOLLOW-UP (OUTPATIENT)
Dept: FAMILY MEDICINE | Facility: CLINIC | Age: 35
End: 2025-03-22

## 2025-03-27 DIAGNOSIS — Z13.6 ENCOUNTER FOR SCREENING FOR CARDIOVASCULAR DISORDERS: Primary | ICD-10-CM

## 2025-03-27 LAB
OHS QRS DURATION: 98 MS
OHS QTC CALCULATION: 454 MS

## 2025-04-01 ENCOUNTER — HOSPITAL ENCOUNTER (OUTPATIENT)
Dept: RADIOLOGY | Facility: HOSPITAL | Age: 35
Discharge: HOME | End: 2025-04-01
Payer: COMMERCIAL

## 2025-04-01 DIAGNOSIS — Z13.6 ENCOUNTER FOR SCREENING FOR CARDIOVASCULAR DISORDERS: ICD-10-CM

## 2025-04-01 PROCEDURE — 75571 CT HRT W/O DYE W/CA TEST: CPT | Mod: TC

## 2025-04-01 PROCEDURE — 75571 CT HRT W/O DYE W/CA TEST: CPT | Mod: 26,,, | Performed by: RADIOLOGY

## 2025-04-02 ENCOUNTER — RESULTS FOLLOW-UP (OUTPATIENT)
Dept: FAMILY MEDICINE | Facility: CLINIC | Age: 35
End: 2025-04-02

## 2025-04-15 ENCOUNTER — OFFICE VISIT (OUTPATIENT)
Dept: OPTOMETRY | Facility: CLINIC | Age: 35
End: 2025-04-15
Payer: COMMERCIAL

## 2025-04-15 DIAGNOSIS — Z01.00 EXAMINATION OF EYES AND VISION: Primary | ICD-10-CM

## 2025-04-15 DIAGNOSIS — H52.7 REFRACTIVE ERROR: ICD-10-CM

## 2025-04-15 PROCEDURE — 92015 DETERMINE REFRACTIVE STATE: CPT | Mod: S$GLB,,, | Performed by: OPTOMETRIST

## 2025-04-15 PROCEDURE — 92014 COMPRE OPH EXAM EST PT 1/>: CPT | Mod: S$GLB,,, | Performed by: OPTOMETRIST

## 2025-04-15 PROCEDURE — 99999 PR PBB SHADOW E&M-EST. PATIENT-LVL III: CPT | Mod: PBBFAC,,, | Performed by: OPTOMETRIST

## 2025-04-15 NOTE — PROGRESS NOTES
HPI     Annual Exam     Additional comments: DLE 4-2024           Comments    Pt here today for ocular health exam with refraction.  States no visual   changes or complaints but would like updated rx.    Denies any headaches or eye pain.    (-) allergies / dry eyes  (-) gtts  (-) floaters or light flashes          Last edited by Rosana Mcgraw on 4/15/2025  7:54 AM.            Assessment /Plan     For exam results, see Encounter Report.    Examination of eyes and vision    Refractive error      1. Examination of eyes and vision (Primary)  Good ocular health OU    2. Refractive error  Dispensed updated spectacle Rx - mild change from previous    RTC 1 yr

## 2025-05-05 ENCOUNTER — HOSPITAL ENCOUNTER (EMERGENCY)
Facility: HOSPITAL | Age: 35
Discharge: HOME | End: 2025-05-05
Attending: STUDENT IN AN ORGANIZED HEALTH CARE EDUCATION/TRAINING PROGRAM
Payer: COMMERCIAL

## 2025-05-05 VITALS
SYSTOLIC BLOOD PRESSURE: 135 MMHG | HEART RATE: 82 BPM | BODY MASS INDEX: 27.61 KG/M2 | TEMPERATURE: 99 F | WEIGHT: 171.81 LBS | HEIGHT: 66 IN | OXYGEN SATURATION: 96 % | DIASTOLIC BLOOD PRESSURE: 85 MMHG | RESPIRATION RATE: 17 BRPM

## 2025-05-05 DIAGNOSIS — R11.2 NAUSEA AND VOMITING, UNSPECIFIED VOMITING TYPE: Primary | ICD-10-CM

## 2025-05-05 LAB
ABSOLUTE EOSINOPHIL (SMH): 0.08 K/UL
ABSOLUTE MONOCYTE (SMH): 0.58 K/UL (ref 0.3–1)
ABSOLUTE NEUTROPHIL COUNT (SMH): 4.9 K/UL (ref 1.8–7.7)
ALBUMIN SERPL-MCNC: 4.5 G/DL (ref 3.5–5.2)
ALP SERPL-CCNC: 51 UNIT/L (ref 40–150)
ALT SERPL-CCNC: 16 UNIT/L (ref 10–44)
ANION GAP (SMH): 15 MMOL/L (ref 8–16)
AST SERPL-CCNC: 31 UNIT/L (ref 11–45)
BASOPHILS # BLD AUTO: 0.05 K/UL
BASOPHILS NFR BLD AUTO: 0.6 %
BILIRUB SERPL-MCNC: 1.1 MG/DL (ref 0.1–1)
BUN SERPL-MCNC: 14 MG/DL (ref 6–20)
CALCIUM SERPL-MCNC: 9.2 MG/DL (ref 8.7–10.5)
CHLORIDE SERPL-SCNC: 102 MMOL/L (ref 95–110)
CO2 SERPL-SCNC: 21 MMOL/L (ref 23–29)
CREAT SERPL-MCNC: 0.8 MG/DL (ref 0.5–1.4)
ERYTHROCYTE [DISTWIDTH] IN BLOOD BY AUTOMATED COUNT: 11.6 % (ref 11.5–14.5)
GFR SERPLBLD CREATININE-BSD FMLA CKD-EPI: >60 ML/MIN/1.73/M2
GLUCOSE SERPL-MCNC: 90 MG/DL (ref 70–110)
HCT VFR BLD AUTO: 40.6 % (ref 37–48.5)
HGB BLD-MCNC: 14.7 GM/DL (ref 12–16)
HOLD SPECIMEN: NORMAL
IMM GRANULOCYTES # BLD AUTO: 0.03 K/UL (ref 0–0.04)
IMM GRANULOCYTES NFR BLD AUTO: 0.3 % (ref 0–0.5)
LIPASE SERPL-CCNC: 11 U/L (ref 4–60)
LYMPHOCYTES # BLD AUTO: 3.18 K/UL (ref 1–4.8)
MCH RBC QN AUTO: 31.8 PG (ref 27–31)
MCHC RBC AUTO-ENTMCNC: 36.2 G/DL (ref 32–36)
MCV RBC AUTO: 88 FL (ref 82–98)
NUCLEATED RBC (/100WBC) (SMH): 0 /100 WBC
PLATELET # BLD AUTO: 235 K/UL (ref 150–450)
PMV BLD AUTO: 11.1 FL (ref 9.2–12.9)
POTASSIUM SERPL-SCNC: 3.5 MMOL/L (ref 3.5–5.1)
PROT SERPL-MCNC: 7.7 GM/DL (ref 6–8.4)
RBC # BLD AUTO: 4.62 M/UL (ref 4–5.4)
RELATIVE EOSINOPHIL (SMH): 0.9 % (ref 0–8)
RELATIVE LYMPHOCYTE (SMH): 35.9 % (ref 18–48)
RELATIVE MONOCYTE (SMH): 6.6 % (ref 4–15)
RELATIVE NEUTROPHIL (SMH): 55.7 % (ref 38–73)
SODIUM SERPL-SCNC: 138 MMOL/L (ref 136–145)
WBC # BLD AUTO: 8.85 K/UL (ref 3.9–12.7)

## 2025-05-05 PROCEDURE — 96375 TX/PRO/DX INJ NEW DRUG ADDON: CPT

## 2025-05-05 PROCEDURE — 36415 COLL VENOUS BLD VENIPUNCTURE: CPT | Performed by: PHYSICIAN ASSISTANT

## 2025-05-05 PROCEDURE — 63600175 PHARM REV CODE 636 W HCPCS: Performed by: PHYSICIAN ASSISTANT

## 2025-05-05 PROCEDURE — 83690 ASSAY OF LIPASE: CPT | Performed by: PHYSICIAN ASSISTANT

## 2025-05-05 PROCEDURE — 96361 HYDRATE IV INFUSION ADD-ON: CPT

## 2025-05-05 PROCEDURE — 25000003 PHARM REV CODE 250: Performed by: PHYSICIAN ASSISTANT

## 2025-05-05 PROCEDURE — 96365 THER/PROPH/DIAG IV INF INIT: CPT

## 2025-05-05 PROCEDURE — 99284 EMERGENCY DEPT VISIT MOD MDM: CPT | Mod: 25

## 2025-05-05 PROCEDURE — 85025 COMPLETE CBC W/AUTO DIFF WBC: CPT | Performed by: PHYSICIAN ASSISTANT

## 2025-05-05 PROCEDURE — 82565 ASSAY OF CREATININE: CPT | Performed by: PHYSICIAN ASSISTANT

## 2025-05-05 RX ORDER — FAMOTIDINE 20 MG/1
20 TABLET, FILM COATED ORAL NIGHTLY PRN
Qty: 20 TABLET | Refills: 0 | Status: SHIPPED | OUTPATIENT
Start: 2025-05-05 | End: 2026-05-05

## 2025-05-05 RX ORDER — FAMOTIDINE 10 MG/ML
20 INJECTION, SOLUTION INTRAVENOUS
Status: COMPLETED | OUTPATIENT
Start: 2025-05-05 | End: 2025-05-05

## 2025-05-05 RX ORDER — PROMETHAZINE HYDROCHLORIDE 25 MG/1
25 TABLET ORAL EVERY 6 HOURS PRN
Qty: 20 TABLET | Refills: 0 | Status: SHIPPED | OUTPATIENT
Start: 2025-05-05

## 2025-05-05 RX ADMIN — SODIUM CHLORIDE, POTASSIUM CHLORIDE, SODIUM LACTATE AND CALCIUM CHLORIDE 1000 ML: 600; 310; 30; 20 INJECTION, SOLUTION INTRAVENOUS at 08:05

## 2025-05-05 RX ADMIN — PROMETHAZINE HYDROCHLORIDE 25 MG: 25 INJECTION, SOLUTION INTRAMUSCULAR; INTRAVENOUS at 07:05

## 2025-05-05 RX ADMIN — SODIUM CHLORIDE 1000 ML: 9 INJECTION, SOLUTION INTRAVENOUS at 07:05

## 2025-05-05 RX ADMIN — FAMOTIDINE 20 MG: 10 INJECTION, SOLUTION INTRAVENOUS at 07:05

## 2025-05-05 NOTE — ED PROVIDER NOTES
Encounter Date: 5/5/2025       History     Chief Complaint   Patient presents with    Nausea     Pt states nausea since Friday night when she took 5mg instead of 0.5mg of her weight loss medication megovy.      Veronique Vick is a 34 y.o. female presenting for evaluation of persistent nausea and vomiting since Friday night after accidentally giving herself 5 mg of Wegovy, instead of 0.5 mg.  No known fever, but she had associated night sweats.  No diarrhea or persistent abdominal pain.  She has tried phenergan suppositories with little improvement.  Allergic to Zofran.   She has a past medical history of Closed fracture of distal end of left radius with routine healing (03/31/2016), Depression, Migraines, Pap smear of cervix shows high risk HPV present, and Wrist fracture.      The history is provided by the patient.     Review of patient's allergies indicates:   Allergen Reactions    Eggs [egg derived] Nausea Only     Cannot take egg derived flu vaccine    Ondansetron Dermatitis    Sulfa (sulfonamide antibiotics) Nausea Only    Sulfamethoxazole-trimethoprim Diarrhea     Past Medical History:   Diagnosis Date    Closed fracture of distal end of left radius with routine healing 03/31/2016    Depression     Migraines     Pap smear of cervix shows high risk HPV present     Wrist fracture     left     Past Surgical History:   Procedure Laterality Date    colposcopy      multiple    DIAGNOSTIC LAPAROSCOPY N/A 12/13/2024    Procedure: LAPAROSCOPY, DIAGNOSTIC;  Surgeon: Familia Osullivan MD;  Location: [facility];  Service: OB/GYN;  Laterality: N/A;    HYSTERECTOMY      LAPAROSCOPIC LYSIS OF ADHESIONS N/A 12/13/2024    Procedure: LYSIS, ADHESIONS, LAPAROSCOPIC;  Surgeon: Familia Osullivan MD;  Location: [facility];  Service: OB/GYN;  Laterality: N/A;    LAPAROSCOPIC TOTAL HYSTERECTOMY N/A 10/28/2024    Procedure: HYSTERECTOMY, TOTAL, LAPAROSCOPIC;  Surgeon: Yevgeniy Fields MD;  [facility];  Service: OB/GYN;   Laterality: N/A;  uterus , cervix and bilaterlal fallopian tubes removed    ORIF RADIUS & ULNA FRACTURES Left 2016    with hardware    REPAIR OF VAGINAL CUFF N/A 12/13/2024    Procedure: REPAIR, VAGINAL CUFF;  Surgeon: Familia Osullivan MD;  Location: [facility];  Service: OB/GYN;  Laterality: N/A;    WISDOM TOOTH EXTRACTION       Family History   Problem Relation Name Age of Onset    Migraines Mother      Hyperlipidemia Father      Hypertension Father      Migraines Sister      Macular degeneration Paternal Grandmother          wet     Social History[1]  Review of Systems   Constitutional:  Positive for fever (subjective). Negative for chills.   Respiratory:  Negative for cough, chest tightness, shortness of breath and wheezing.    Cardiovascular:  Negative for chest pain and palpitations.   Gastrointestinal:  Positive for nausea and vomiting. Negative for abdominal pain, constipation and diarrhea.   Genitourinary:  Negative for dysuria and hematuria.   Musculoskeletal:  Negative for arthralgias, back pain, joint swelling, myalgias, neck pain and neck stiffness.   Skin:  Negative for color change, pallor, rash and wound.   Neurological:  Negative for weakness and numbness.   Hematological:  Does not bruise/bleed easily.       Physical Exam     Initial Vitals [05/05/25 1827]   BP Pulse Resp Temp SpO2   (!) 129/91 88 17 98.4 °F (36.9 °C) 99 %      MAP       --         Physical Exam    Nursing note and vitals reviewed.  Constitutional: She appears well-developed and well-nourished. She is not diaphoretic. No distress.   HENT:   Head: Normocephalic and atraumatic.   Neck: Neck supple.   Normal range of motion.  Cardiovascular:  Normal rate, regular rhythm, normal heart sounds and intact distal pulses.     Exam reveals no gallop and no friction rub.       No murmur heard.  Pulmonary/Chest: Breath sounds normal. No respiratory distress. She has no wheezes. She has no rhonchi. She has no rales.   Abdominal: Abdomen is soft.  She exhibits no distension and no mass. There is no abdominal tenderness.   No palpable abdominal tenderness noted.      Musculoskeletal:         General: No tenderness or edema. Normal range of motion.      Cervical back: Normal range of motion and neck supple.     Neurological: She is alert and oriented to person, place, and time. She has normal strength. No sensory deficit.   Skin: Skin is warm and dry. No rash and no abscess noted. No erythema.   Psychiatric: She has a normal mood and affect.         ED Course   Procedures  Labs Reviewed   COMPREHENSIVE METABOLIC PANEL - Abnormal       Result Value    Sodium 138      Potassium 3.5      Chloride 102      CO2 21 (*)     Glucose 90      BUN 14      Creatinine 0.8      Calcium 9.2      Protein Total 7.7      Albumin 4.5      Bilirubin Total 1.1 (*)     ALP 51      AST 31      ALT 16      Anion Gap 15      eGFR >60     CBC WITH DIFFERENTIAL - Abnormal    WBC 8.85      RBC 4.62      Hgb 14.7      Hct 40.6      MCV 88      MCH 31.8 (*)     MCHC 36.2 (*)     RDW 11.6      Platelet Count 235      MPV 11.1      Nucleated RBC 0      Neut % 55.7      Lymph % 35.9      Mono % 6.6      Eos % 0.9      Basophil % 0.6      Imm Grans % 0.3      Neut # 4.9      Lymph # 3.18      Mono # 0.58      Eos # 0.08      Baso # 0.05      Imm Grans # 0.03     LIPASE - Normal    Lipase Level 11     CBC W/ AUTO DIFFERENTIAL    Narrative:     The following orders were created for panel order CBC auto differential.  Procedure                               Abnormality         Status                     ---------                               -----------         ------                     CBC with Differential[[id number]]       Abnormal            Final result                 Please view results for these tests on the individual orders.   EXTRA TUBES    Narrative:     The following orders were created for panel order EXTRA TUBES.  Procedure                               Abnormality         Status                      ---------                               -----------         ------                     Light Blue Top Hold[[id number]]                             Final result                 Please view results for these tests on the individual orders.   LIGHT BLUE TOP HOLD    Extra Tube Hold            Imaging Results    None          Medications   sodium chloride 0.9% bolus 1,000 mL 1,000 mL (0 mLs Intravenous Stopped 5/5/25 2007)   promethazine (PHENERGAN) 25 mg in 0.9% NaCl 50 mL IVPB (0 mg Intravenous Stopped 5/5/25 1944)   famotidine (PF) injection 20 mg (20 mg Intravenous Given 5/5/25 1906)   lactated ringers bolus 1,000 mL (1,000 mLs Intravenous New Bag 5/5/25 2000)     Medical Decision Making  Differential Diagnosis:   Gastritis   Pancreatitis   Gastroparesis   SBO     Pt emergently evaluated here in the ED.    Labs stable without leukocytosis.  Lipase is negative.  Normal electrolytes, renal function and LFTs.  She has noticed symptomatic improvement with medications and IV fluids here in the emergency department.  She is able to tolerate p.o. challenge.  We feel comfortable discharging her home to follow up with her primary care provider for re-evaluation and further management as needed.  She voices understanding is agreeable with the plan.  She is given specific return precautions.    Amount and/or Complexity of Data Reviewed  Labs: ordered. Decision-making details documented in ED Course.    Risk  OTC drugs.  Prescription drug management.                                      Clinical Impression:  Final diagnoses:  [R11.2] Nausea and vomiting, unspecified vomiting type (Primary)          ED Disposition Condition    Discharge Stable          ED Prescriptions       Medication Sig Dispense Start Date End Date Auth. Provider    famotidine (PEPCID) 20 MG tablet Take 1 tablet (20 mg total) by mouth nightly as needed. 20 tablet 5/5/2025 5/5/2026 Roseanna Ghotra, DOROTEO    promethazine (PHENERGAN) 25 MG  tablet Take 1 tablet (25 mg total) by mouth every 6 (six) hours as needed for Nausea. 20 tablet 5/5/2025 -- Rsoeanna Ghotra PA-C          Follow-up Information       Follow up With Specialties Details Why Contact Info Additional Information    [facility]  As needed, If symptoms worsen [address] [place] [address] [address] [place] [phone] 1st floor                 [1]   Social History  Tobacco Use    Smoking status: Never     Passive exposure: Never    Smokeless tobacco: Never   Substance Use Topics    Alcohol use: Yes     Alcohol/week: 0.0 standard drinks of alcohol     Comment: social    Drug use: No        Roseanna Ghotra PA-C  05/05/25 2882

## 2025-05-06 NOTE — ED NOTES
Assumed care:  Veronique Vick is awake, alert and oriented x 3, skin warm and dry, in NAD with family at bedside.  Patient states that she took too much of her wygove medication and now has N&V.      Patient identifiers for Veronique Vick checked and correct.  LOC:  Veronique Vick is awake, alert, and aware of environment with an appropriate affect. She is oriented x 3 and speaking appropriately.  APPEARANCE:  She is resting comfortably and in no acute distress. She is clean and well groomed, patient's clothing is properly fastened.  SKIN:  The skin is warm and dry. She has normal skin turgor and moist mucus membranes. Skin is intact; no bruising or breakdown noted.  MUSCULOSKELETAL:  She is moving all extremities well, no obvious deformities noted. Pulses intact.   RESPIRATORY:  Airway is open and patent. Respirations are spontaneous and non-labored with normal effort and rate.  CARDIAC:  She has a normal rate and rhythm. No peripheral edema noted. Capillary refill < 3 seconds.  ABDOMEN:  No distention noted.  Soft and non-tender upon palpation.  N&V  NEUROLOGICAL:  PERRL. Facial expression is symmetrical. Hand grasps are equal bilaterally. Normal sensation in all extremities when touched with finger.  Allergies reported:    Review of patient's allergies indicates:   Allergen Reactions    Eggs [egg derived] Nausea Only     Cannot take egg derived flu vaccine    Ondansetron Dermatitis    Sulfa (sulfonamide antibiotics) Nausea Only    Sulfamethoxazole-trimethoprim Diarrhea

## 2025-06-12 DIAGNOSIS — Z21 HIV INFECTION, UNSPECIFIED SYMPTOM STATUS: ICD-10-CM

## 2025-06-12 RX ORDER — BICTEGRAVIR SODIUM, EMTRICITABINE, AND TENOFOVIR ALAFENAMIDE FUMARATE 50; 200; 25 MG/1; MG/1; MG/1
1 TABLET ORAL DAILY
Qty: 30 TABLET | Refills: 11 | Status: ACTIVE | OUTPATIENT
Start: 2025-06-12 | End: 2026-06-12

## 2025-06-19 ENCOUNTER — TELEPHONE (OUTPATIENT)
Dept: OBSTETRICS AND GYNECOLOGY | Facility: CLINIC | Age: 35
End: 2025-06-19
Payer: COMMERCIAL

## 2025-06-19 DIAGNOSIS — N89.8 VAGINAL DISCHARGE: Primary | ICD-10-CM

## 2025-06-19 RX ORDER — METRONIDAZOLE 500 MG/1
500 TABLET ORAL EVERY 12 HOURS
Qty: 14 TABLET | Refills: 0 | Status: SHIPPED | OUTPATIENT
Start: 2025-06-19 | End: 2025-06-26

## 2025-07-02 ENCOUNTER — PATIENT MESSAGE (OUTPATIENT)
Dept: INFECTIOUS DISEASES | Facility: CLINIC | Age: 35
End: 2025-07-02
Payer: COMMERCIAL

## 2025-07-02 DIAGNOSIS — Z21 HIV INFECTION, UNSPECIFIED SYMPTOM STATUS: Primary | ICD-10-CM

## 2025-07-14 DIAGNOSIS — K13.79 UVULAR SWELLING: Primary | ICD-10-CM

## 2025-07-14 DIAGNOSIS — J02.9 PHARYNGITIS, UNSPECIFIED ETIOLOGY: ICD-10-CM

## 2025-07-14 RX ORDER — PREDNISONE 20 MG/1
40 TABLET ORAL DAILY
Qty: 10 TABLET | Refills: 0 | Status: SHIPPED | OUTPATIENT
Start: 2025-07-14 | End: 2025-07-19

## 2025-07-14 RX ORDER — AMOXICILLIN AND CLAVULANATE POTASSIUM 875; 125 MG/1; MG/1
1 TABLET, FILM COATED ORAL EVERY 12 HOURS
Qty: 20 TABLET | Refills: 0 | Status: SHIPPED | OUTPATIENT
Start: 2025-07-14 | End: 2025-07-24

## 2025-07-16 ENCOUNTER — OFFICE VISIT (OUTPATIENT)
Dept: INFECTIOUS DISEASES | Facility: CLINIC | Age: 35
End: 2025-07-16
Payer: COMMERCIAL

## 2025-07-16 VITALS
HEIGHT: 66 IN | WEIGHT: 171.81 LBS | TEMPERATURE: 97 F | BODY MASS INDEX: 27.61 KG/M2 | SYSTOLIC BLOOD PRESSURE: 128 MMHG | OXYGEN SATURATION: 98 % | DIASTOLIC BLOOD PRESSURE: 64 MMHG

## 2025-07-16 DIAGNOSIS — R87.611 PAP SMEAR OF CERVIX WITH ASCUS, CANNOT EXCLUDE HGSIL: ICD-10-CM

## 2025-07-16 DIAGNOSIS — Z21 HIV INFECTION, UNSPECIFIED SYMPTOM STATUS: Primary | ICD-10-CM

## 2025-07-16 DIAGNOSIS — E74.39 GLUCOSE INTOLERANCE: ICD-10-CM

## 2025-07-16 PROCEDURE — 3078F DIAST BP <80 MM HG: CPT | Mod: CPTII,S$GLB,, | Performed by: STUDENT IN AN ORGANIZED HEALTH CARE EDUCATION/TRAINING PROGRAM

## 2025-07-16 PROCEDURE — 99214 OFFICE O/P EST MOD 30 MIN: CPT | Mod: S$GLB,,, | Performed by: STUDENT IN AN ORGANIZED HEALTH CARE EDUCATION/TRAINING PROGRAM

## 2025-07-16 PROCEDURE — 3074F SYST BP LT 130 MM HG: CPT | Mod: CPTII,S$GLB,, | Performed by: STUDENT IN AN ORGANIZED HEALTH CARE EDUCATION/TRAINING PROGRAM

## 2025-07-16 PROCEDURE — 3008F BODY MASS INDEX DOCD: CPT | Mod: CPTII,S$GLB,, | Performed by: STUDENT IN AN ORGANIZED HEALTH CARE EDUCATION/TRAINING PROGRAM

## 2025-07-16 PROCEDURE — G2211 COMPLEX E/M VISIT ADD ON: HCPCS | Mod: S$GLB,,, | Performed by: STUDENT IN AN ORGANIZED HEALTH CARE EDUCATION/TRAINING PROGRAM

## 2025-07-16 PROCEDURE — 1159F MED LIST DOCD IN RCRD: CPT | Mod: CPTII,S$GLB,, | Performed by: STUDENT IN AN ORGANIZED HEALTH CARE EDUCATION/TRAINING PROGRAM

## 2025-07-16 NOTE — PROGRESS NOTES
[facility] - Infectious Diseases   Department of Infectious Disease  Office Visit Note        PATIENT NAME: Veronique Vick  YOB: 1990   MRN: [id number]  DATE OF VISIT: 2025    REASON FOR VISIT: Follow-up and HIV Positive/AIDS      HISTORY OF PRESENT ILLNESS     Veronique Vick is a 34 y.o. female  very pleasant  with past medical history of abnormal Pap smear and HIV diagnosed in  on Triumeq.  She was established patient with Dr. Ledesma, here to establish care.    Patient is a very good historian, mentions she is feeling much better now that she recovered custody of her kid.  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  She mentioned she would like to have her uterus removed keeping history of prior abnormal HPV, she is not very happy having to have serial Pap smears at least 3 a year.      She denies any constitutional symptoms, no acute complaints.  She mentions the Triumeq pill is too big and usually gets nauseous and unable to fully tolerate.  She reports adherence to medication.  Discussed with patient and she is interested in switching to Biktarvy, will send prescription to the pharmacy.    CURRENT ART: Biktarvy    HIV HISTORY:  Diagnosed  - CD4 1038, ratio 41.5% and VL at diagnosis 1527  Risk factor - unprotected sex with infected partner who did not disclose information  History OI - none  History STIs - HPV  Prior ART:   - Triumeq  Genotype testing 20:  PRB = PROBABLE OR EMERGING RESISTANCE   OTHER MUTATIONS DETECTED:   RT GENE MUTATIONS: F214L   OH GENE MUTATIONS: D60E,L63Q,I64V,V77I    HLA- testing negative 20  G6PD testing not performed    Outdoor activities:  Nonsmoker, no alcohol, no drugs.  Works at a medical office.  Travel:  None recent  Implants:  None  Antibiotic history:  None    Social History  Marital Status: Single, has a boyfriend, he is HIV negative   Alcohol History:  reports current alcohol use.  Tobacco  History:  reports that she has never smoked. She has never been exposed to tobacco smoke. She has never used smokeless tobacco.  Drug History:  reports no history of drug use.      INTERVAL HISTORY     7/15/2025: Patient presents today for follow up and to discuss injectable HIV medication. She reports unintentional Wegovy overdose, accidentally administering 1 mL instead of recommended 0.1 mL dose. She experienced severe side effects including continuous vomiting for 48-72 hours, which led to emergency room visit for intravenous fluid replacement. Despite limited urination during this period, kidney function remained normal. Her weight dropped from approximately 185 lbs to 165-170 lbs, with a recent 10-pound weight loss following the overdose incident. She is currently maintaining weight in the 165-170 pound range after previous weight reduction post-hysterectomy. She was diagnosed with strep throat on Monday and is currently taking Augmentin as prescribed. She was also prescribed prednisone but has elected not to take it. She underwent total hysterectomy with ovary preservation in October, with follow-up procedure planned for December. She was informed by Dr. Osullivan that additional Pap smears may not be necessary following the procedure. She notes concern about potential persistent HPV in the vaginal vault despite ovary preservation. She is currently taking Victoza and is in the process of weaning off Zoloft and Wellbutrin. She expresses desire to minimize medication use but remains compliant with current medication regimen. She reports being sexually active with a male partner for approximately one year since June. Her partner refuses to take PrEP, citing a belief that his risk is zero and demonstrating general resistance to medical care, including routine health screenings. She acknowledges her partner has likely not been tested for sexually transmitted infections.  Will order blood work today, we will start  paperwork for Cabenuva.     1/15/25:  Patient is here for follow-up.  Since last visit she has had multiple events.  She had a hysterectomy on October 28th which was complicated by a dehiscence of her vaginal cuff which required emergent surgery.  She mentioned she is much better now.  She also mentioned she had an incident while cleaning chickens the [employer] her left forearm, needed attention in the emergency room, got stitches and doxycycline.  She mentioned she has been on 4 different antibiotics in the last 3 months, currently not taking anything besides her ART and antidepressants.  She is compliant to ART, has no issues, no missed doses.  Last labs from June 2024 CD4 count 1290, ratio 49.8%, viral load undetectable.  She mentions she is dating someone and she is very happy, partner is HIV negative.  He is aware of her diagnosis.    6/25/2024: Here to establish care.      ALLERGIES AND CURRENT MEDICATIONS     Review of patient's allergies indicates:   Allergen Reactions    Eggs [egg derived] Nausea Only     Cannot take egg derived flu vaccine    Ondansetron Dermatitis    Sulfa (sulfonamide antibiotics) Nausea Only    Sulfamethoxazole-trimethoprim Diarrhea       Current Outpatient Medications   Medication Sig Dispense Refill    albuterol (VENTOLIN HFA) 90 mcg/actuation inhaler Inhale 2 puffs into the lungs every 6 (six) hours as needed for Wheezing. Rescue 18 g 2    amoxicillin-clavulanate 875-125mg (AUGMENTIN) 875-125 mg per tablet Take 1 tablet by mouth every 12 (twelve) hours. for 10 days 20 tablet 0    atogepant (QULIPTA) 60 mg Tab Take 1 tablet (60 mg) by mouth once daily. 90 tablet 1    sopczwheq-yhzqkvqt-eupukjs ala (BIKTARVY) -25 mg (25 kg or greater) Take 1 tablet by mouth once daily. 30 tablet 11    buPROPion (WELLBUTRIN XL) 150 MG TB24 tablet Take 1 tablet (150 mg total) by mouth once daily. 90 tablet 3    ibuprofen (ADVIL,MOTRIN) 800 MG tablet Take 1 tablet (800 mg total) by mouth every  8 (eight) hours as needed for Pain. 30 tablet 0    predniSONE (DELTASONE) 20 MG tablet Take 2 tablets (40 mg total) by mouth once daily. for 5 days 10 tablet 0    promethazine (PHENERGAN) 12.5 MG Tab Take 12.5 mg by mouth every 4 (four) hours as needed.      promethazine (PHENERGAN) 25 MG tablet Take 1 tablet (25 mg total) by mouth every 6 (six) hours as needed for Nausea. 20 tablet 0    sertraline (ZOLOFT) 50 MG tablet Take 1 tablet (50 mg total) by mouth once daily. 90 tablet 3    sumatriptan (IMITREX) 50 MG tablet Take 50 mg by mouth every 2 (two) hours as needed for Migraine.       Current Facility-Administered Medications   Medication Dose Route Frequency Provider Last Rate Last Admin    [START ON 7/23/2025] cabotegravir-rilpivirine 600 mg/3 mL- 900 mg/3 mL injection 6 mL  6 mL Intramuscular Q30 Days         [START ON 11/13/2025] cabotegravir-rilpivirine 600 mg/3 mL- 900 mg/3 mL injection 6 mL  6 mL Intramuscular Q8 weeks            MEDICAL/SURGICAL/FAMILY HISTORY     Past Surgical History:   Procedure Laterality Date    colposcopy      multiple    DIAGNOSTIC LAPAROSCOPY N/A 12/13/2024    Procedure: LAPAROSCOPY, DIAGNOSTIC;  Surgeon: Familia Osullivan MD;  Location: [facility];  Service: OB/GYN;  Laterality: N/A;    HYSTERECTOMY      LAPAROSCOPIC LYSIS OF ADHESIONS N/A 12/13/2024    Procedure: LYSIS, ADHESIONS, LAPAROSCOPIC;  Surgeon: Familia Osullivan MD;  Location: [facility];  Service: OB/GYN;  Laterality: N/A;    LAPAROSCOPIC TOTAL HYSTERECTOMY N/A 10/28/2024    Procedure: HYSTERECTOMY, TOTAL, LAPAROSCOPIC;  Surgeon: Yevgeniy Fields MD;  Location: [facility];  Service: OB/GYN;  Laterality: N/A;  uterus , cervix and bilaterlal fallopian tubes removed    ORIF RADIUS & ULNA FRACTURES Left 2016    with hardware    REPAIR OF VAGINAL CUFF N/A 12/13/2024    Procedure: REPAIR, VAGINAL CUFF;  Surgeon: Familia Osullivan MD;  Location: [facility];  Service: OB/GYN;  Laterality: N/A;    WISDOM TOOTH EXTRACTION       Past Medical  History:   Diagnosis Date    Closed fracture of distal end of left radius with routine healing 03/31/2016    Depression     Migraines     Pap smear of cervix shows high risk HPV present     Wrist fracture     left     Family History   Problem Relation Name Age of Onset    Migraines Mother      Hyperlipidemia Father      Hypertension Father      Migraines Sister      Macular degeneration Paternal Grandmother          wet          REVIEW OF SYSTEMS     Review of Systems  Constitutional:  Denies fevers, chills, night sweats, +loss of appetite, +weight losss  HEENT: Denies visual changes,ear pain, sinus congestion, mouth pain or trouble swallowing, sore throat or  dental pain.  Neck: Denies neck pain or lumps, +sore throat resolved   Respiratory: Denies shortness of breath, coughing, wheezing or hemoptysis.  Cardiovascular:  Denies chest pain, palpitations or edema.  Gastrointestinal: Denies  nausea, +vomiting, denies constipation or diarrhea.  Genitourinary:  Denies dysuria, frequency, urgency or hematuria - vaginal leakage improved  Musculoskeletal:  Denies joint pain or swelling, difficulty walking.    Skin:  Denies rash or itching.  Neurologic:  Denies motor or sensory loss, headaches or dizziness.    Psychiatric:  Denies changes in mood or behavior.      PHYSICAL EXAM     Vital Signs  Wt Readings from Last 3 Encounters:   07/16/25 77.9 kg (171 lb 12.8 oz)   05/05/25 77.9 kg (171 lb 12.8 oz)   01/31/25 78.2 kg (172 lb 6.4 oz)     Temp Readings from Last 3 Encounters:   07/16/25 97.2 °F (36.2 °C) (Temporal)   05/05/25 98.9 °F (37.2 °C) (Oral)   01/15/25 97.2 °F (36.2 °C) (Temporal)     BP Readings from Last 3 Encounters:   07/16/25 128/64   05/05/25 135/85   01/31/25 122/74     Pulse Readings from Last 3 Encounters:   07/16/25 (P) 68   05/05/25 82   01/15/25 78       Physical Exam  General:  Very pleasant  female, breathing comfortable on room air  Eyes: Eyes with no icterus or injection. Vision grossly  normal  Ears: Hearing grossly normal.  Nose: Nares patent  Mouth: Moist mucous membranes, dentition is very good. No ulcerations, erythema or exudates.  Neck: Supple, no tenderness to palpation.  Cardiovascular: Regular rate and rhythm, no murmurs, no edema.    Respiratory:  Clear to auscultation bilaterally, no tachypnea or increased work of breathing.  Gastrointestinal:  Small surgical wounds from laparoscopic surgery, healed, no evidence of cellulitis.  Soft with active bowel sounds, no tenderness to palpation, no distention.  Genitourinary:  No suprapubic tenderness.  Musculoskeletal:  Moves all extremities with equal strength.    Skin:  Warm and dry, no obvious rashes.    Neuro:   Oriented, conversant, follows commands.  Psych: Good mood, normal affect.      HEALTH MAINTENANCE     Health Screening:    The following items were screened for at today's visit:  [] Substance Abuse  [] Alcohol Abuse  [] Depression  [] Employment  [] Housing Situation/Homelessness  [] Travel  [] TB Exposure   [] Domestic Abuse   [] Smoking Cessation    Travel History: [place], [place], [place] (cruise)  Vaccine History: Hep B series (6th grade), Tdap 2/2018, yearly flu vaccine(egg allergy), zostavax 2013, COVID x 3 2021, menactra #1 2022,   Advanced Directive:   Safer Sex:  active, condoms   Gyn: (Dr. Fields) UTD, repeat PAP 4/2022, 10/22 abnormal but improved, 5/2023 with CIN1, HPV negative now, still ASCUS on last pap  Mammogram: at age 39  Bone Density: at age 64  Colonoscopy: at age 39    Vaccines    Immunization History   Administered Date(s) Administered    COVID-19, MRNA, LN-S, PF (Pfizer) (Purple Cap) 12/17/2020, 01/07/2021, 12/28/2021    DTaP 1989, 02/06/1990, 03/27/1990, 05/22/1991, 07/18/1995    HIB 01/02/1991    Hepatitis A, Adult 09/09/2020, 03/08/2021    Influenza (FLUBLOK) - Quadrivalent - Recombinant - PF *Preferred* (egg allergy) 02/04/2020, 10/13/2020, 10/28/2021, 12/01/2022    Influenza - Quadrivalent 09/10/2020  "   Influenza - Quadrivalent - MDCK - PF 11/14/2023    Influenza - Trivalent - Flucelvax - PF 10/21/2024    MMR 01/02/1991, 07/18/1995    Meningococcal Conjugate (MCV4O) 1 Vial Dose(10yr-55yr) 01/13/2022    Meningococcal Conjugate (MCV4P) 01/13/2022    OPV 1989, 02/06/1990, 05/22/1991, 07/18/1995    Pneumococcal Conjugate - 13 Valent 12/07/2020    Pneumococcal Polysaccharide - 23 Valent 02/12/2021       ASCVD Risk Score:  Consider high-intensity statin therapy if 10-year ASCVD risk score >7.5%  The ASCVD Risk score (Willard DK, et al., 2019) failed to calculate for the following reasons:    The 2019 ASCVD risk score is only valid for ages 40 to 79    LABS AND DIAGNOSTICS     Significant Labs: I have reviewed all relevant and available labs and microbiology. .    HIV Viral Load:   HIV VL: undetectable, Date: 1/15/2025     CD4 Count: 1213, no ratio    No results found for: "WBCABSOLUTE", "LYMPHPERCENT", "LYMPHABSOLUT", "[id number]", "TCELLINTERP"    CBC with Differential:   Lab Results   Component Value Date    WBC 8.85 05/05/2025    RBC 4.62 05/05/2025    HGB 14.7 05/05/2025    HCT 40.6 05/05/2025    MCV 88 05/05/2025    MCH 31.8 (H) 05/05/2025    MCHC 36.2 (H) 05/05/2025    RDW 11.6 05/05/2025     05/05/2025    MPV 11.1 05/05/2025       CMP:   Lab Results   Component Value Date     05/05/2025    K 3.5 05/05/2025     05/05/2025    ANIONGAP 15 05/05/2025    BUN 14 05/05/2025    CREATININE 0.8 05/05/2025    CALCIUM 9.2 05/05/2025    BILITOT 1.1 (H) 05/05/2025    AST 31 05/05/2025    ALT 16 05/05/2025    ALKPHOS 51 05/05/2025    PROT 7.7 05/05/2025    ALBUMIN 4.5 05/05/2025       Quantiferon:   TB Gold: Negative Date: 12/18/2023    Syphilis IgG: Negative 6/19/23    Toxoplasmosis IgG: POSITIVE 8/17/20    No results found for: "NGONNO", "LABCHLAPCR"    Lipid Panel: check q1yr  Lab Results   Component Value Date    TRIG 54 03/19/2025    CHOL 171 03/19/2025    HDL 58 03/19/2025    NONHDLCHOL 113 " "03/19/2025    LDLCALC 102.2 03/19/2025       A1C: check q1yr  Lab Results   Component Value Date    HGBA1C 4.9 06/26/2024       Urinalysis: check q1yr, check q6mo if taking tenofovir  Lab Results   Component Value Date    PROTEINUA Negative 07/05/2022    UROBILINOGEN 0.2 05/31/2023    KETONESU Negative 05/31/2023    NITRITE Negative 05/31/2023    LEUKOCYTESUR 1+ (A) 07/05/2022    COLORU Yellow 05/31/2023    RBCUA 20 (H) 07/05/2022         Hepatitis A IgG: negative 8/17/20     Hepatitis B Surface Antibody: check once  Lab Results   Component Value Date    HEPBSAB Reactive 08/17/2020       Hepatitis C Antibody: check q1yr if high risk, once if low risk  Lab Results   Component Value Date    HEPCAB Negative 07/21/2022         CrCl cannot be calculated (Patient's most recent lab result is older than the maximum 7 days allowed.).    INFLAMMATORY/PROCAL  LAST 7    No results found for: "ESR"  No results found for: "CRP"    PRIOR MICROBIOLOGY:  No results found for the last 90 days.      LAST 7 DAYS MICROBIOLOGY   Microbiology Results (last 7 days)       ** No results found for the last 168 hours. **            Pathology:  5/21/24: PAP smear: Cytology Results: Negative for intraepithelial lesion or malignancy.    Significant Diagnostics: I have reviewed all relevant and available diagnostic results.    IMAGING  DEXA Scan: if age>50, check q10yrs  No results found for this or any previous visit.    CURRENT/PREVIOUS VISIT EKG  Results for orders placed or performed in visit on 03/27/25   EKG 12-lead    Collection Time: 03/27/25  7:38 AM   Result Value Ref Range    QRS Duration 98 ms    [facility] QTC Calculation 454 ms    Narrative    Test Reason :    Vent. Rate :  66 BPM     Atrial Rate :  66 BPM     P-R Int : 152 ms          QRS Dur :  98 ms      QT Int : 434 ms       P-R-T Axes :  63  45  55 degrees    QTcB Int : 454 ms    Normal sinus rhythm  Normal ECG  No previous ECGs available  Confirmed by Tobias Gilbert ([id number]) on 3/27/2025 " 12:06:59 PM    Referred By:            Confirmed By: Tobias Gilbert       ASSESSMENT AND PLAN:     HIV stable on Biktarvy, stable  Last labs from January 2025 CD4 count 1213, viral load undetectable  Labs today  Continue Biktarvy 1 tab p.o. daily  Cabenuva ordered, will call her once it is approved by insurance    History of dehiscence vaginal cuff, status post surgery 12/13/24 - improved       Assessment & Plan    Z21 HIV infection, unspecified symptom status  R87.611 Pap smear of cervix with ASCUS, cannot exclude HGSIL  E74.39 Glucose intolerance    IMPRESSION:   Discussed transitioning patient from oral HIV medication to injectable Cabenuva (long-acting cabotegravir/rilpivirine) given consistent undetectable viral load and compliance with appointments.   Reviewed Cabenuva administration schedule: Day 0 initial injections, Day 30 booster, then every 2 months thereafter.   Plan to monitor viral load more frequently initially with Cabenuva, then transition to every 6 months if stable.   Explained false positive RPR can occur with conditions like lupus due to autoantibodies.   Started process to initiate Cabenuva (long-acting cabotegravir/rilpivirine) injections.         HIV infection, unspecified symptom status  -     Parker-Suppressor Ratio; Future; Expected date: 07/16/2025  -     HIV RNA, Quantitative, PCR; Future; Expected date: 07/16/2025  -     CBC Auto Differential; Future; Expected date: 07/16/2025  -     Comprehensive Metabolic Panel; Future; Expected date: 07/16/2025  -     Lipid Panel; Future; Expected date: 07/16/2025  -     Quantiferon(R) TB Gold - Client Incubated; Future; Expected date: 07/16/2025  -     Treponema Pallidium Antibodies IgG, IgM; Future; Expected date: 07/16/2025  -     Hepatitis A antibody, IgG; Future; Expected date: 07/16/2025  -     Hepatitis B Core AB Total (REFL); Future; Expected date: 07/16/2025  -     Hepatitis B Surface Ab, Qualitative; Future; Expected date: 07/16/2025  -      Hepatitis B Surface Antigen; Future; Expected date: 07/16/2025  -     Hepatitis C Antibody; Future; Expected date: 07/16/2025  -     Prior authorization Order  -     Nursing communication  -     cabotegravir-rilpivirine 600 mg/3 mL- 900 mg/3 mL injection 6 mL  -     cabotegravir-rilpivirine 600 mg/3 mL- 900 mg/3 mL injection 6 mL    Pap smear of cervix with ASCUS, cannot exclude HGSIL    Glucose intolerance  -     Hemoglobin A1C; Future; Expected date: 07/16/2025        Follow up in about 6 months (around 1/16/2026).      I spent a total of 30 minutes on the day of the visit.  This includes face to face time and non-face to face time preparing to see the patient (eg, review of tests), obtaining and/or reviewing separately obtained history, documenting clinical information in the electronic or other health record, independently interpreting results and communicating results to the patient/family/caregiver, or care coordinator.       Palmira Miller MD  Date of Service: 07/16/2025      This note was created using [employer]  voice recognition software that occasionally misinterpreted phrases or words.  This note was generated with the assistance of ambient listening technology. Verbal consent was obtained by the patient and accompanying visitor(s) for the recording of patient appointment to facilitate this note. I attest to having reviewed and edited the generated note for accuracy, though some syntax or spelling errors may persist. Please contact the author of this note for any clarification.

## 2025-07-17 ENCOUNTER — LAB VISIT (OUTPATIENT)
Dept: LAB | Facility: HOSPITAL | Age: 35
End: 2025-07-17
Attending: STUDENT IN AN ORGANIZED HEALTH CARE EDUCATION/TRAINING PROGRAM
Payer: COMMERCIAL

## 2025-07-17 DIAGNOSIS — Z21 HIV INFECTION, UNSPECIFIED SYMPTOM STATUS: ICD-10-CM

## 2025-07-17 DIAGNOSIS — E74.39 GLUCOSE INTOLERANCE: ICD-10-CM

## 2025-07-17 LAB
ABSOLUTE EOSINOPHIL (SMH): 0.15 K/UL
ABSOLUTE MONOCYTE (SMH): 0.27 K/UL (ref 0.3–1)
ABSOLUTE NEUTROPHIL COUNT (SMH): 1.5 K/UL (ref 1.8–7.7)
ALBUMIN SERPL-MCNC: 4.2 G/DL (ref 3.5–5.2)
ALP SERPL-CCNC: 46 UNIT/L (ref 40–150)
ALT SERPL-CCNC: 13 UNIT/L (ref 10–44)
ANION GAP (SMH): 10 MMOL/L (ref 8–16)
AST SERPL-CCNC: 18 UNIT/L (ref 11–45)
BASOPHILS # BLD AUTO: 0.04 K/UL
BASOPHILS NFR BLD AUTO: 1 %
BILIRUB SERPL-MCNC: 0.4 MG/DL (ref 0.1–1)
BUN SERPL-MCNC: 8 MG/DL (ref 6–20)
CALCIUM SERPL-MCNC: 8.8 MG/DL (ref 8.7–10.5)
CHLORIDE SERPL-SCNC: 106 MMOL/L (ref 95–110)
CHOLEST SERPL-MCNC: 184 MG/DL (ref 120–199)
CHOLEST/HDLC SERPL: 3 {RATIO} (ref 2–5)
CO2 SERPL-SCNC: 25 MMOL/L (ref 23–29)
CREAT SERPL-MCNC: 0.8 MG/DL (ref 0.5–1.4)
EAG (SMH): 97 MG/DL (ref 68–131)
ERYTHROCYTE [DISTWIDTH] IN BLOOD BY AUTOMATED COUNT: 12.2 % (ref 11.5–14.5)
GFR SERPLBLD CREATININE-BSD FMLA CKD-EPI: >60 ML/MIN/1.73/M2
GLUCOSE SERPL-MCNC: 79 MG/DL (ref 70–110)
HBA1C MFR BLD: 5 % (ref 4–5.6)
HCT VFR BLD AUTO: 39.4 % (ref 37–48.5)
HCV AB SERPL QL IA: NORMAL
HDLC SERPL-MCNC: 61 MG/DL (ref 40–75)
HDLC SERPL: 33.2 % (ref 20–50)
HGB BLD-MCNC: 13.1 GM/DL (ref 12–16)
IMM GRANULOCYTES # BLD AUTO: 0.01 K/UL (ref 0–0.04)
IMM GRANULOCYTES NFR BLD AUTO: 0.2 % (ref 0–0.5)
LDLC SERPL CALC-MCNC: 109.2 MG/DL (ref 63–159)
LYMPHOCYTES # BLD AUTO: 2.12 K/UL (ref 1–4.8)
MCH RBC QN AUTO: 31.4 PG (ref 27–31)
MCHC RBC AUTO-ENTMCNC: 33.2 G/DL (ref 32–36)
MCV RBC AUTO: 95 FL (ref 82–98)
NONHDLC SERPL-MCNC: 123 MG/DL
NUCLEATED RBC (/100WBC) (SMH): 0 /100 WBC
PLATELET # BLD AUTO: 178 K/UL (ref 150–450)
PMV BLD AUTO: 12.1 FL (ref 9.2–12.9)
POTASSIUM SERPL-SCNC: 3.6 MMOL/L (ref 3.5–5.1)
PROT SERPL-MCNC: 6.8 GM/DL (ref 6–8.4)
RBC # BLD AUTO: 4.17 M/UL (ref 4–5.4)
RELATIVE EOSINOPHIL (SMH): 3.7 % (ref 0–8)
RELATIVE LYMPHOCYTE (SMH): 51.8 % (ref 18–48)
RELATIVE MONOCYTE (SMH): 6.6 % (ref 4–15)
RELATIVE NEUTROPHIL (SMH): 36.7 % (ref 38–73)
SODIUM SERPL-SCNC: 141 MMOL/L (ref 136–145)
TRIGL SERPL-MCNC: 69 MG/DL (ref 30–150)
WBC # BLD AUTO: 4.09 K/UL (ref 3.9–12.7)

## 2025-07-17 PROCEDURE — 83036 HEMOGLOBIN GLYCOSYLATED A1C: CPT

## 2025-07-17 PROCEDURE — 87340 HEPATITIS B SURFACE AG IA: CPT | Mod: 59

## 2025-07-17 PROCEDURE — 86803 HEPATITIS C AB TEST: CPT

## 2025-07-17 PROCEDURE — 84075 ASSAY ALKALINE PHOSPHATASE: CPT

## 2025-07-17 PROCEDURE — 86790 VIRUS ANTIBODY NOS: CPT | Mod: 59

## 2025-07-17 PROCEDURE — 87536 HIV-1 QUANT&REVRSE TRNSCRPJ: CPT | Mod: 59

## 2025-07-17 PROCEDURE — 86593 SYPHILIS TEST NON-TREP QUANT: CPT | Mod: 59

## 2025-07-17 PROCEDURE — 85025 COMPLETE CBC W/AUTO DIFF WBC: CPT

## 2025-07-17 PROCEDURE — 82465 ASSAY BLD/SERUM CHOLESTEROL: CPT

## 2025-07-17 PROCEDURE — 36415 COLL VENOUS BLD VENIPUNCTURE: CPT

## 2025-07-17 PROCEDURE — 86360 T CELL ABSOLUTE COUNT/RATIO: CPT

## 2025-07-18 LAB
CD3 ABSOLUTE FLOW (OHS): 1946 CELLS/UL (ref 700–2100)
CD3 PERCENT (OHS): 87.72 % (ref 55–83)
CD3+CD4+ CELLS # SPEC: 1237 CELLS/UL (ref 300–1400)
CD3+CD4+ CELLS NFR BLD: 55.74 % (ref 28–57)
CD3+CD8+ CELLS NFR SPEC: 30.89 % (ref 10–39)
CD4/CD8 RATIO FLOW (OHS): 1.8 (ref 0.9–3.6)
CD8 ABSOLUTE FLOW (OHS): 685 CELLS/UL (ref 200–900)
HAV AB SER QL IA: REACTIVE
HBV SURFACE AG SERPL QL IA: NORMAL
LABORATORY COMMENT REPORT: ABNORMAL
T PALLIDUM IGG+IGM SER QL: NORMAL

## 2025-07-21 LAB — HIV1 RNA SERPL NAA+PROBE-LOG#: NOT DETECTED {LOG_COPIES}/ML

## 2025-07-31 ENCOUNTER — PATIENT MESSAGE (OUTPATIENT)
Dept: INFECTIOUS DISEASES | Facility: CLINIC | Age: 35
End: 2025-07-31
Payer: COMMERCIAL

## 2025-08-08 ENCOUNTER — PATIENT MESSAGE (OUTPATIENT)
Dept: ADMINISTRATIVE | Facility: OTHER | Age: 35
End: 2025-08-08
Payer: COMMERCIAL

## 2025-08-13 ENCOUNTER — PATIENT MESSAGE (OUTPATIENT)
Dept: ADMINISTRATIVE | Facility: OTHER | Age: 35
End: 2025-08-13
Payer: COMMERCIAL

## 2025-08-20 ENCOUNTER — CLINICAL SUPPORT (OUTPATIENT)
Dept: INFECTIOUS DISEASES | Facility: CLINIC | Age: 35
End: 2025-08-20
Payer: COMMERCIAL

## 2025-08-20 VITALS
WEIGHT: 168 LBS | DIASTOLIC BLOOD PRESSURE: 58 MMHG | OXYGEN SATURATION: 98 % | BODY MASS INDEX: 27.12 KG/M2 | SYSTOLIC BLOOD PRESSURE: 118 MMHG | HEART RATE: 80 BPM

## 2025-08-20 DIAGNOSIS — Z21 HIV INFECTION, UNSPECIFIED SYMPTOM STATUS: Primary | ICD-10-CM

## 2025-08-20 PROCEDURE — 99999 PR PBB SHADOW E&M-EST. PATIENT-LVL II: CPT | Mod: PBBFAC,,,

## 2025-08-22 ENCOUNTER — TELEPHONE (OUTPATIENT)
Dept: INFECTIOUS DISEASES | Facility: CLINIC | Age: 35
End: 2025-08-22
Payer: COMMERCIAL

## (undated) DEVICE — DISSECTOR 5MM ENDOPATH

## (undated) DEVICE — APPLICATOR CHLORAPREP ORN 26ML

## (undated) DEVICE — GOWN POLY REINF BRTH SLV 2XL

## (undated) DEVICE — TROCAR ENDOPATH XCEL 11MM 10CM

## (undated) DEVICE — DRAPE LEGGINGS CUFF 33X51IN

## (undated) DEVICE — IRRIGATOR SUCTION W/TIP

## (undated) DEVICE — SCRUB DYNA-HEX LIQ 4% CHG 4OZ

## (undated) DEVICE — SYR IRRIGATION BULB STER 60ML

## (undated) DEVICE — PACK CUSTOM ENDO CHOLO SLI

## (undated) DEVICE — STRAP OR TABLE 5IN X 72IN

## (undated) DEVICE — DRAPE UINDERBUT GRAD PCH

## (undated) DEVICE — PENCIL SMK EVAC CONNECTOR 10FT

## (undated) DEVICE — SYR 30CC LUER LOCK

## (undated) DEVICE — GLOVE SENSICARE PI GRN 8

## (undated) DEVICE — SUT MONOCRYL 4-0 PS-2

## (undated) DEVICE — COVER TABLE 44X90 STERILE

## (undated) DEVICE — GLOVE SENSICARE PI ALOE 7.5

## (undated) DEVICE — SYR ONLY LUER LOCK 20CC

## (undated) DEVICE — NDL SAFETY 21G X 1 1/2 ECLPSE

## (undated) DEVICE — SLEEVE SCD EXPRESS KNEE MEDIUM

## (undated) DEVICE — SOL NACL IRR 1000ML BTL

## (undated) DEVICE — LINER SUCTION 3000CC

## (undated) DEVICE — PAD PINK TRENDELENBURG POS XL

## (undated) DEVICE — ADHESIVE DERMABOND ADVANCED

## (undated) DEVICE — GOWN POLY REINF BRTH SLV XL

## (undated) DEVICE — SOL NORMAL USPCA 0.9%

## (undated) DEVICE — SET TUBE PNEUMOCLEAR SE HI FLO

## (undated) DEVICE — SCISSOR 5MMX35CM DIRECT DRIVE

## (undated) DEVICE — TRAY CATH 1-LYR URIMTR 16FR

## (undated) DEVICE — TROCAR KII FIOS 5MM X 100MM

## (undated) DEVICE — SUT MCRYL PLUS 4-0 PS2 27IN

## (undated) DEVICE — SOL CLEARIFY VISUALIZATION LAP

## (undated) DEVICE — TOWEL OR DISP STRL BLUE 4/PK

## (undated) DEVICE — PAD SUREFIT GRND ELECTRD 10FT

## (undated) DEVICE — NDL PNEUMOPERITONEUM 150MM

## (undated) DEVICE — GLOVE SENSICARE PI GRN 7.5

## (undated) DEVICE — SUT 0 VICRYL / CT-1

## (undated) DEVICE — SUT VICRYL+ 27 UR-6 VIOL